# Patient Record
Sex: FEMALE | Race: WHITE | NOT HISPANIC OR LATINO | Employment: FULL TIME | URBAN - METROPOLITAN AREA
[De-identification: names, ages, dates, MRNs, and addresses within clinical notes are randomized per-mention and may not be internally consistent; named-entity substitution may affect disease eponyms.]

---

## 2022-06-01 ENCOUNTER — OFFICE VISIT (OUTPATIENT)
Dept: PEDIATRICS | Facility: CLINIC | Age: 32
End: 2022-06-01
Payer: COMMERCIAL

## 2022-06-01 VITALS
HEIGHT: 67 IN | SYSTOLIC BLOOD PRESSURE: 132 MMHG | BODY MASS INDEX: 22.16 KG/M2 | RESPIRATION RATE: 24 BRPM | HEART RATE: 83 BPM | TEMPERATURE: 98.8 F | DIASTOLIC BLOOD PRESSURE: 72 MMHG | OXYGEN SATURATION: 97 % | WEIGHT: 141.2 LBS

## 2022-06-01 DIAGNOSIS — Z76.89 ENCOUNTER TO ESTABLISH CARE: Primary | ICD-10-CM

## 2022-06-01 DIAGNOSIS — J45.40 MODERATE PERSISTENT ASTHMA WITHOUT COMPLICATION: ICD-10-CM

## 2022-06-01 DIAGNOSIS — Z12.4 CERVICAL CANCER SCREENING: ICD-10-CM

## 2022-06-01 DIAGNOSIS — F41.9 ANXIETY AND DEPRESSION: ICD-10-CM

## 2022-06-01 DIAGNOSIS — F32.A ANXIETY AND DEPRESSION: ICD-10-CM

## 2022-06-01 DIAGNOSIS — Z00.00 ROUTINE GENERAL MEDICAL EXAMINATION AT A HEALTH CARE FACILITY: ICD-10-CM

## 2022-06-01 PROCEDURE — G0145 SCR C/V CYTO,THINLAYER,RESCR: HCPCS | Performed by: NURSE PRACTITIONER

## 2022-06-01 PROCEDURE — 99385 PREV VISIT NEW AGE 18-39: CPT | Performed by: NURSE PRACTITIONER

## 2022-06-01 PROCEDURE — 87624 HPV HI-RISK TYP POOLED RSLT: CPT | Performed by: NURSE PRACTITIONER

## 2022-06-01 RX ORDER — ALBUTEROL SULFATE 90 UG/1
2 AEROSOL, METERED RESPIRATORY (INHALATION) EVERY 6 HOURS
Qty: 18 G | Refills: 3 | Status: SHIPPED | OUTPATIENT
Start: 2022-06-01 | End: 2022-09-29

## 2022-06-01 RX ORDER — MONTELUKAST SODIUM 10 MG/1
10 TABLET ORAL AT BEDTIME
COMMUNITY
End: 2022-09-29

## 2022-06-01 RX ORDER — SERTRALINE HYDROCHLORIDE 100 MG/1
100 TABLET, FILM COATED ORAL DAILY
COMMUNITY
End: 2022-09-29

## 2022-06-01 RX ORDER — FLUTICASONE PROPIONATE 110 UG/1
1 AEROSOL, METERED RESPIRATORY (INHALATION) 2 TIMES DAILY
Qty: 12 G | Refills: 3 | Status: SHIPPED | OUTPATIENT
Start: 2022-06-01 | End: 2023-08-15

## 2022-06-01 ASSESSMENT — ENCOUNTER SYMPTOMS
NAUSEA: 0
SHORTNESS OF BREATH: 1
DIZZINESS: 0
FEVER: 0
HEARTBURN: 0
HEADACHES: 0
ARTHRALGIAS: 0
FREQUENCY: 0
NERVOUS/ANXIOUS: 1
SORE THROAT: 0
DIARRHEA: 0
PARESTHESIAS: 0
PALPITATIONS: 0
COUGH: 0
EYE PAIN: 0
WEAKNESS: 0
HEMATURIA: 0
MYALGIAS: 0
CHILLS: 0
BREAST MASS: 0
JOINT SWELLING: 0
DYSURIA: 0
CONSTIPATION: 0
HEMATOCHEZIA: 0
ABDOMINAL PAIN: 0

## 2022-06-01 NOTE — PROGRESS NOTES
SUBJECTIVE:   CC: Selena Dutton is an 31 year old woman who presents for preventive health visit.     Patient has been advised of split billing requirements and indicates understanding: Yes     Healthy Habits:     Getting at least 3 servings of Calcium per day:  Yes    Bi-annual eye exam:  Yes    Dental care twice a year:  NO    Sleep apnea or symptoms of sleep apnea:  None    Diet:  Regular (no restrictions)    Frequency of exercise:  None    Taking medications regularly:  Yes    Medication side effects:  None    PHQ-2 Total Score: 0    Additional concerns today:  Yes    Would like to establish care with new provider.     She and her  are planning to star trying to conceive in 2023.    History of asthma since second grade - has been severe in the past. Previously managed on montelukast, flovent, albuterol. Ran out of montelukast and flovent so currently just utilizing albuterol. Several triggers - allergies to pollens, cats, dogs, dust, cold.    Sertraline since college for anxiety and depression (anxiety worse), sertraline 100 mg daily. Well managed.    Today's PHQ-2 Score:   PHQ-2 ( 1999 Pfizer) 6/1/2022   Q1: Little interest or pleasure in doing things 0   Q2: Feeling down, depressed or hopeless 0   PHQ-2 Score 0   Q1: Little interest or pleasure in doing things Not at all   Q2: Feeling down, depressed or hopeless Not at all   PHQ-2 Score 0       Abuse: Current or Past (Physical, Sexual or Emotional) - No  Do you feel safe in your environment? Yes    Social History     Tobacco Use     Smoking status: Never Smoker     Smokeless tobacco: Never Used   Substance Use Topics     Alcohol use: Yes     Comment: 3 drinks/week     If you drink alcohol do you typically have >3 drinks per day or >7 drinks per week? No    No flowsheet data found.    Reviewed orders with patient.  Reviewed health maintenance and updated orders accordingly - Yes  BP Readings from Last 3 Encounters:   06/01/22 132/72    Wt Readings  from Last 3 Encounters:   06/01/22 64 kg (141 lb 3.2 oz)            Breast Cancer Screening:  Any new diagnosis of family breast, ovarian, or bowel cancer? No    FHS-7: No flowsheet data found.    Patient under 40 years of age: Routine Mammogram Screening not recommended.   Pertinent mammograms are reviewed under the imaging tab.    History of abnormal Pap smear: NO - age 30-65 PAP every 5 years with negative HPV co-testing recommended     Reviewed and updated as needed this visit by clinical staff                  Reviewed and updated as needed this visit by Provider    Patient Active Problem List   Diagnosis     Moderate persistent asthma without complication     Anxiety and depression     Past Medical History:   Diagnosis Date     Anxiety and depression      Moderate persistent asthma without complication      Past Surgical History:   Procedure Laterality Date     HC TOOTH EXTRACTION W/FORCEP       MOUTH SURGERY       Family History   Problem Relation Age of Onset     Osteoporosis Mother      Heart Disease Father      No Known Problems Brother      Cholelithiasis Maternal Grandmother      Myocardial Infarction Maternal Grandfather      Skin Cancer Paternal Grandmother      Diabetes Type 2  Paternal Grandfather      Transient ischemic attack Maternal Uncle      Social History     Socioeconomic History     Marital status:      Spouse name: Not on file     Number of children: Not on file     Years of education: Not on file     Highest education level: Not on file   Occupational History     Not on file   Tobacco Use     Smoking status: Never Smoker     Smokeless tobacco: Never Used   Substance and Sexual Activity     Alcohol use: Yes     Comment: 3 drinks/week     Drug use: Never     Sexual activity: Yes     Partners: Male     Birth control/protection: None   Other Topics Concern     Not on file   Social History Narrative    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     Lives in [place] with her  and  dog.     Bought a house in [facility] this Spring, closes in 2 weeks.      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         Free time: puppy paule named Michelle.         Sary Meyer, DNP, APRN, CNP    06/01/22     Social Determinants of Health     Financial Resource Strain: Not on file   Food Insecurity: Not on file   Transportation Needs: Not on file   Physical Activity: Not on file   Stress: Not on file   Social Connections: Not on file   Intimate Partner Violence: Not on file   Housing Stability: Not on file     Current Outpatient Medications   Medication     albuterol (PROAIR HFA/PROVENTIL HFA/VENTOLIN HFA) 108 (90 Base) MCG/ACT inhaler     fluticasone (FLOVENT HFA) 110 MCG/ACT inhaler     montelukast (SINGULAIR) 10 MG tablet     sertraline (ZOLOFT) 100 MG tablet     No current facility-administered medications for this visit.      No Known Allergies      Review of Systems   Constitutional: Negative for chills and fever.   HENT: Negative for congestion, ear pain, hearing loss and sore throat.    Eyes: Negative for pain and visual disturbance.   Respiratory: Positive for shortness of breath. Negative for cough.    Cardiovascular: Negative for chest pain, palpitations and peripheral edema.   Gastrointestinal: Negative for abdominal pain, constipation, diarrhea, heartburn, hematochezia and nausea.   Breasts:  Negative for tenderness, breast mass and discharge.   Genitourinary: Negative for dysuria, frequency, genital sores, hematuria, pelvic pain, urgency, vaginal bleeding and vaginal discharge.   Musculoskeletal: Negative for arthralgias, joint swelling and myalgias.   Skin: Negative for rash.   Neurological: Negative for dizziness, weakness, headaches and paresthesias.   Psychiatric/Behavioral: Negative for mood changes. The patient is nervous/anxious.         OBJECTIVE:   /72 (BP Location: Right arm, Patient Position: Sitting, Cuff Size: Adult Regular)   Pulse 83   Temp 98.8  F (37.1  C)  "(Tympanic)   Resp 24   Ht 1.702 m (5' 7\")   Wt 64 kg (141 lb 3.2 oz)   SpO2 97%   BMI 22.12 kg/m    Physical Exam  Constitutional: appears to be in no acute distress, comfortable, pleasant.   Eyes: anicteric, conjunctiva clear without drainage or erythema. OMAR.   Ears, Nose and Throat: tympanic membranes gray with LR,  nose without nasal discharge. OP: no erythema to posterior pharynx, negative post nasal drainage, tonsils +1 no erythema or exudate.  Neck: supple, thyroid palpable,not enlarged, no nodules   Breast: Exam deferred (deferred after discussion of exam options with patient, no symptoms or concerns).   Cardiovascular: regular rate and rhythm, normal S1 and S2, no murmurs, rubs or gallops, peripheral pulses full and symmetric; negative peripheral edema   Respiratory: Air entry throughout. Breathing pattern unlabored without the use of accessory muscles. Clear to auscultation A and P, no wheezes or crackles, normal breath sounds.    Gastrointestinal: rounded, soft. Positive bowel sounds x4, nontender, no masses.   Genitourinary: external genitalia is without lesions. Introitus is normal, vaginal walls pink and moist without lesions or evidence of trauma. There is no abnormal discharge from the cervix. Cervix is pink without polyps or lesions.  Musculoskeletal: full range of motion, no edema.   Skin: pink, turgor smooth and elastic. Negative for lesions or dryness.  Neurological: normal gait, no tremor.   Psychological: appropriate mood and affect.   Lymphatic: no cervical, axillary, supraclavicular, or infraclavicular lymphadenopathy.    Diagnostic Test Results:  Labs reviewed in Epic    ASSESSMENT/PLAN:   (Z76.89) Encounter to establish care  (primary encounter diagnosis)  Histories and medications reviewed and updated.      (Z00.00) Routine general medical examination at a health care facility  Age appropriate screening and preventative care have been addressed today. Unable to review immunization " "record, will obtain outside records. Recommend annual vision exams as well as biannual dental exams. They will follow up for annual physical again in one year.   - REVIEW OF HEALTH MAINTENANCE PROTOCOL ORDERS            (Z12.4) Cervical cancer screening  - Pap Screen with HPV - recommended age 30 - 65 years            (J45.40) Moderate persistent asthma without complication  Stable. Refills provided.   - fluticasone (FLOVENT HFA) 110 MCG/ACT inhaler,   - albuterol (PROAIR HFA/PROVENTIL HFA/VENTOLIN HFA) 108 (90 Base) MCG/ACT inhaler      (F41.9,  F32.A) Anxiety and depression  Stable, continue current dose of sertraline. Annual follow-up or as needed.        COUNSELING:  Reviewed preventive health counseling, as reflected in patient instructions  Special attention given to:        Vision screening       Immunizations       Contraception       Family planning       Safe sex practices/STD prevention       Consider Hep C screening for all patients one time for ages 18-79 years       HIV screeninx in teen years, 1x in adult years, and at intervals if high risk    Estimated body mass index is 22.12 kg/m  as calculated from the following:    Height as of this encounter: 1.702 m (5' 7\").    Weight as of this encounter: 64 kg (141 lb 3.2 oz).        She reports that she has never smoked. She has never used smokeless tobacco.      Counseling Resources:  ATP IV Guidelines  Pooled Cohorts Equation Calculator  Breast Cancer Risk Calculator  BRCA-Related Cancer Risk Assessment: FHS-7 Tool  FRAX Risk Assessment  ICSI Preventive Guidelines  Dietary Guidelines for Americans,   USDA's MyPlate  ASA Prophylaxis  Lung CA Screening    KURT Tomas CNP  [facility]  "

## 2022-06-02 PROBLEM — F41.9 ANXIETY AND DEPRESSION: Status: ACTIVE | Noted: 2022-06-02

## 2022-06-02 PROBLEM — F32.A ANXIETY AND DEPRESSION: Status: ACTIVE | Noted: 2022-06-02

## 2022-06-02 PROBLEM — J45.40 MODERATE PERSISTENT ASTHMA WITHOUT COMPLICATION: Status: ACTIVE | Noted: 2022-06-02

## 2022-06-06 LAB
BKR LAB AP GYN ADEQUACY: NORMAL
BKR LAB AP GYN INTERPRETATION: NORMAL
BKR LAB AP HPV REFLEX: NORMAL
BKR LAB AP PREVIOUS ABNORMAL: NORMAL
PATH REPORT.COMMENTS IMP SPEC: NORMAL
PATH REPORT.COMMENTS IMP SPEC: NORMAL
PATH REPORT.RELEVANT HX SPEC: NORMAL

## 2022-06-08 LAB
HUMAN PAPILLOMA VIRUS 16 DNA: NEGATIVE
HUMAN PAPILLOMA VIRUS 18 DNA: NEGATIVE
HUMAN PAPILLOMA VIRUS FINAL DIAGNOSIS: NORMAL
HUMAN PAPILLOMA VIRUS OTHER HR: NEGATIVE

## 2022-09-29 ENCOUNTER — MYC MEDICAL ADVICE (OUTPATIENT)
Dept: PEDIATRICS | Facility: CLINIC | Age: 32
End: 2022-09-29

## 2022-09-29 DIAGNOSIS — Z30.09 GENERAL COUNSELING FOR PRESCRIPTION OF ORAL CONTRACEPTIVES: ICD-10-CM

## 2022-09-29 DIAGNOSIS — F41.9 ANXIETY AND DEPRESSION: Primary | ICD-10-CM

## 2022-09-29 DIAGNOSIS — J45.40 MODERATE PERSISTENT ASTHMA WITHOUT COMPLICATION: ICD-10-CM

## 2022-09-29 DIAGNOSIS — F32.A ANXIETY AND DEPRESSION: Primary | ICD-10-CM

## 2022-09-29 RX ORDER — DESOGESTREL AND ETHINYL ESTRADIOL 0.15-0.03
1 KIT ORAL DAILY
Qty: 84 TABLET | Refills: 3 | Status: SHIPPED | OUTPATIENT
Start: 2022-09-29 | End: 2023-05-04

## 2022-09-29 RX ORDER — MONTELUKAST SODIUM 10 MG/1
10 TABLET ORAL AT BEDTIME
Qty: 90 TABLET | Refills: 3 | Status: SHIPPED | OUTPATIENT
Start: 2022-09-29 | End: 2023-10-10

## 2022-09-29 RX ORDER — SERTRALINE HYDROCHLORIDE 100 MG/1
100 TABLET, FILM COATED ORAL DAILY
Qty: 90 TABLET | Refills: 3 | Status: SHIPPED | OUTPATIENT
Start: 2022-09-29 | End: 2023-10-10

## 2022-09-29 RX ORDER — ALBUTEROL SULFATE 90 UG/1
2 AEROSOL, METERED RESPIRATORY (INHALATION) EVERY 6 HOURS
Qty: 18 G | Refills: 3 | Status: SHIPPED | OUTPATIENT
Start: 2022-09-29 | End: 2023-09-19

## 2022-09-29 NOTE — TELEPHONE ENCOUNTER
Sary,    Please see MC message from pt  Meds pended    Thank you  John Patel RN on 9/29/2022 at 4:03 PM

## 2022-10-03 ENCOUNTER — HEALTH MAINTENANCE LETTER (OUTPATIENT)
Age: 32
End: 2022-10-03

## 2023-05-04 ENCOUNTER — VIRTUAL VISIT (OUTPATIENT)
Dept: NURSING | Facility: CLINIC | Age: 33
End: 2023-05-04
Payer: COMMERCIAL

## 2023-05-04 DIAGNOSIS — O09.91 SUPERVISION OF HIGH RISK PREGNANCY IN FIRST TRIMESTER: ICD-10-CM

## 2023-05-04 DIAGNOSIS — Z13.79 GENETIC SCREENING: ICD-10-CM

## 2023-05-04 DIAGNOSIS — O36.80X0 PREGNANCY WITH INCONCLUSIVE FETAL VIABILITY: Primary | ICD-10-CM

## 2023-05-04 PROBLEM — O09.90 SUPERVISION OF HIGH-RISK PREGNANCY: Status: ACTIVE | Noted: 2023-05-04

## 2023-05-04 PROCEDURE — 99207 PR NO CHARGE NURSE ONLY: CPT

## 2023-05-04 NOTE — PROGRESS NOTES
SUBJECTIVE:     HPI:    This is a 32 year old female patient,  who presents for her first obstetrical visit.    PIO: Not found..  She is Unknown weeks.  Her cycles are regular.  Her last menstrual period was normal.   Since her LMP, she has experienced  nausea.    Additional History: First pregnancy, has a close friend who saw Dr. Ghotra so is very excited to see her.    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      She has a history of  first pregnancy    Since her last LMP she denies use of alcohol, tobacco and street drugs.    Past medical, surgical, social and family history were reviewed and updated in [facility].      Current Outpatient Medications   Medication     albuterol (PROAIR HFA/PROVENTIL HFA/VENTOLIN HFA) 108 (90 Base) MCG/ACT inhaler     fluticasone (FLOVENT HFA) 110 MCG/ACT inhaler     montelukast (SINGULAIR) 10 MG tablet     Prenat w/o A-DY-Wcklkwg-FA-DHA (PNV-DHA PO)     sertraline (ZOLOFT) 100 MG tablet     No current facility-administered medications for this visit.       ROS:   12 point review of systems negative other than symptoms noted below or in the HPI.  Gastrointestinal: Nausea    Nurse phone visit completed. Prenatal book and folder containing standard educational hand-outs and brochures will be given at the next visit to patient. Information in folder reviewed over the phone and sent via [facility]. Questions answered. Information given on optional screening available to assess chromosomal anomalies. Pt desires NIPS. Pt advised to call the clinic if she has any questions or concerns related to her pregnancy.  Prenatal labs future ordered.   Chiara Tracy RN on 5/4/2023 at 11:30 AM        No results found for: PAP  PHQ-9 score:        5/2/2023     2:06 PM   PHQ   PHQ-9 Total Score 4   Q9: Thoughts of better off dead/self-harm past 2 weeks Not at all                   5/2/2023     2:05 PM   JAQUELIN-7 SCORE   Total Score 3 (minimal anxiety)   Total Score 3             Patient supplied answers from flow sheet for:  Prenatal OB Questionnaire.  Past Medical History  Have you ever recieved care for your mental health? : (!) (P) Yes  Have you ever been in a major accident or suffered serious trauma?: (P) No  Within the last year, has anyone hit, slapped, kicked or otherwise hurt you?: (P) No  In the last year, has anyone forced you to have sex when you didn't want to?: (P) No    Past Medical History 2   Have you ever received a blood transfusion?: (P) No  Would you accept a blood transfusion if was medically recommended?: (P) Yes  Does anyone in your home smoke?: (P) No   Is your blood type Rh negative?: (P) Unknown  Have you ever ?: (P) No  Have you been hospitalized for a nonsurgical reason excluding normal delivery?: (P) No  Have you ever had an abnormal pap smear?: (P) No    Past Medical History (Continued)  Do you have a history of abnormalities of the uterus?: (P) Unknown  Did your mother take KVNG or any other hormones when she was pregnant with you?: (P) No  Do you have any other problems we have not asked about which you feel may be important to this pregnancy?: (P) No                   OBJECTIVE:     EXAM:  There were no vitals taken for this visit. There is no height or weight on file to calculate BMI.

## 2023-05-10 ASSESSMENT — PATIENT HEALTH QUESTIONNAIRE - PHQ9
10. IF YOU CHECKED OFF ANY PROBLEMS, HOW DIFFICULT HAVE THESE PROBLEMS MADE IT FOR YOU TO DO YOUR WORK, TAKE CARE OF THINGS AT HOME, OR GET ALONG WITH OTHER PEOPLE: SOMEWHAT DIFFICULT
SUM OF ALL RESPONSES TO PHQ QUESTIONS 1-9: 3
SUM OF ALL RESPONSES TO PHQ QUESTIONS 1-9: 3

## 2023-05-10 ASSESSMENT — ANXIETY QUESTIONNAIRES
5. BEING SO RESTLESS THAT IT IS HARD TO SIT STILL: NOT AT ALL
IF YOU CHECKED OFF ANY PROBLEMS ON THIS QUESTIONNAIRE, HOW DIFFICULT HAVE THESE PROBLEMS MADE IT FOR YOU TO DO YOUR WORK, TAKE CARE OF THINGS AT HOME, OR GET ALONG WITH OTHER PEOPLE: NOT DIFFICULT AT ALL
GAD7 TOTAL SCORE: 3
4. TROUBLE RELAXING: SEVERAL DAYS
GAD7 TOTAL SCORE: 3
8. IF YOU CHECKED OFF ANY PROBLEMS, HOW DIFFICULT HAVE THESE MADE IT FOR YOU TO DO YOUR WORK, TAKE CARE OF THINGS AT HOME, OR GET ALONG WITH OTHER PEOPLE?: NOT DIFFICULT AT ALL
GAD7 TOTAL SCORE: 3
3. WORRYING TOO MUCH ABOUT DIFFERENT THINGS: NOT AT ALL
7. FEELING AFRAID AS IF SOMETHING AWFUL MIGHT HAPPEN: NOT AT ALL
2. NOT BEING ABLE TO STOP OR CONTROL WORRYING: NOT AT ALL
7. FEELING AFRAID AS IF SOMETHING AWFUL MIGHT HAPPEN: NOT AT ALL
6. BECOMING EASILY ANNOYED OR IRRITABLE: SEVERAL DAYS
1. FEELING NERVOUS, ANXIOUS, OR ON EDGE: SEVERAL DAYS

## 2023-05-16 LAB
ABO/RH(D): NORMAL
ANTIBODY SCREEN: NEGATIVE
SPECIMEN EXPIRATION DATE: NORMAL

## 2023-05-16 NOTE — PROGRESS NOTES
:      HPI:     This is a 32 year old female patient,  who presents for her first obstetrical visit.     PIO: Estimated Date of Delivery: Dec 12, 2023  She is 10w1d  Her cycles are regular.  Her last menstrual period was normal.   Since her LMP, she has experienced  nausea.     Additional History: First pregnancy, has a close friend who saw Dr. Ghotra so is very excited to see her.     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       She has a history of  first pregnancy     Since her last LMP she denies use of alcohol, tobacco and street drugs.     Past medical, surgical, social and family history were reviewed and updated in EPIC.            Current Outpatient Medications   Medication     albuterol (PROAIR HFA/PROVENTIL HFA/VENTOLIN HFA) 108 (90 Base) MCG/ACT inhaler     fluticasone (FLOVENT HFA) 110 MCG/ACT inhaler     montelukast (SINGULAIR) 10 MG tablet     Prenat w/o I-MI-Qlljegd-FA-DHA (PNV-DHA PO)     sertraline (ZOLOFT) 100 MG tablet      No current facility-administered medications for this visit.         ROS:   12 point review of systems negative other than symptoms noted below or in the HPI.  Gastrointestinal: Nausea     Nurse phone visit completed. Prenatal book and folder containing standard educational hand-outs and brochures will be given at the next visit to patient. Information in folder reviewed over the phone and sent via [facility]. Questions answered. Information given on optional screening available to assess chromosomal anomalies. Pt desires NIPS. Pt advised to call the clinic if she has  "any questions or concerns related to her pregnancy. Prenatal labs future ordered.   Chiara Tracy RN on 5/4/2023 at 11:30 AM           No results found for: PAP  PHQ-9 score:         5/2/2023     2:06 PM   PHQ   PHQ-9 Total Score 4   Q9: Thoughts of better off dead/self-harm past 2 weeks Not at all                          5/2/2023     2:05 PM   JAQUELIN-7 SCORE   Total Score 3 (minimal anxiety)   Total Score 3                  Patient supplied answers from flow sheet for:  Prenatal OB Questionnaire.  Past Medical History  Have you ever recieved care for your mental health? : (!) (P) Yes  Have you ever been in a major accident or suffered serious trauma?: (P) No  Within the last year, has anyone hit, slapped, kicked or otherwise hurt you?: (P) No  In the last year, has anyone forced you to have sex when you didn't want to?: (P) No     Past Medical History 2   Have you ever received a blood transfusion?: (P) No  Would you accept a blood transfusion if was medically recommended?: (P) Yes  Does anyone in your home smoke?: (P) No   Is your blood type Rh negative?: (P) Unknown  Have you ever ?: (P) No  Have you been hospitalized for a nonsurgical reason excluding normal delivery?: (P) No  Have you ever had an abnormal pap smear?: (P) No     Past Medical History (Continued)  Do you have a history of abnormalities of the uterus?: (P) Unknown  Did your mother take KVNG or any other hormones when she was pregnant with you?: (P) No  Do you have any other problems we have not asked about which you feel may be important to this pregnancy?: (P) No      OBJECTIVE:     EXAM:  /66   Ht 1.702 m (5' 7\")   Wt 66.2 kg (146 lb)   LMP 03/07/2023 (Exact Date)   BMI 22.87 kg/m   Body mass index is 22.87 kg/m .    GENERAL: healthy, alert and no distress  EYES: Eyes grossly normal to inspection, PERRL and conjunctivae and sclerae normal  NECK: no adenopathy, no asymmetry, masses, or scars and thyroid normal to palpation  RESP: " lungs clear to auscultation - no rales, rhonchi or wheezes  CV: regular rate and rhythm, normal S1 S2, no S3 or S4, no murmur, click or rub, no peripheral edema and peripheral pulses strong  ABDOMEN: soft, nontender, no hepatosplenomegaly, no masses and bowel sounds normal   (female): normal female external genitalia, normal urethral meatus, vaginal mucosa pink, moist, well rugated, and normal cervix/adnexa/uterus without masses or discharge. GC and chlamydia genprobe swab obtained and sent to lab.   MS: no gross musculoskeletal defects noted, no edema  SKIN: no suspicious lesions or rashes  NEURO: Normal strength and tone, mentation intact and speech normal  PSYCH: mentation appears normal, affect normal/bright    ASSESSMENT/PLAN:       ICD-10-CM    1. Encounter for supervision of normal first pregnancy in first trimester  Z34.01 NEISSERIA GONORRHOEA PCR     CHLAMYDIA TRACHOMATIS PCR     US OB > 14 Weeks      2. Genetic testing  Z13.79 Process and hold DNA     Process and hold DNA      3. Supervision of high risk pregnancy in first trimester  O09.91 Hepatitis B surface antigen     CBC with platelets     HIV Antigen Antibody Combo     Rubella Antibody IgG Quantitative     Treponema Abs w Reflex to RPR and Titer     Urine Culture Aerobic Bacterial     *UA reflex to Microscopic     Hepatitis C antibody     ABO/Rh type and screen      4. Genetic screening  Z13.79 Non Invasive Prenatal Test Cell Free DNA      5. Anxiety and depression  F41.9     F32.A           32 year old , On May 16, 2023 patient is 10w0d weeks of pregnancy with PIO of 2023, by Last Menstrual Period    PLAN/PATIENT INSTRUCTIONS:    Prenatal labs that will be done today reviewed. She has no questions.  Pap smear done today: No     Discussed options for screening for and diagnosis of chromosomal anomalies, including first trimester screen, noninvasive prenatal testing/cell-free fetal DNA testing, CVS/amniocentesis, quad screen, and  ultrasound or comprehensive Level II US at 18-20 weeks. She agreed noninvasive prenatal testing.     Reviewed early pregnancy education, diet, exercise, prenatal vitamins, intercourse. Reviewed the call schedule, labor and delivery, and the schedule of prenatal visits.     Follow up in 4 weeks. She is encouraged to call sooner with questions or concerns.    Recommended weight gain for pregnancy: 25-35 lbs..    Anxiety/Depression: Continue Zoloft    Asthma: Continue medications as needed.       Hazel Ghotra MD

## 2023-05-17 ENCOUNTER — PRENATAL OFFICE VISIT (OUTPATIENT)
Dept: OBGYN | Facility: CLINIC | Age: 33
End: 2023-05-17
Payer: COMMERCIAL

## 2023-05-17 ENCOUNTER — ANCILLARY PROCEDURE (OUTPATIENT)
Dept: ULTRASOUND IMAGING | Facility: CLINIC | Age: 33
End: 2023-05-17
Payer: COMMERCIAL

## 2023-05-17 VITALS
DIASTOLIC BLOOD PRESSURE: 66 MMHG | SYSTOLIC BLOOD PRESSURE: 110 MMHG | WEIGHT: 146 LBS | BODY MASS INDEX: 22.91 KG/M2 | HEIGHT: 67 IN

## 2023-05-17 DIAGNOSIS — Z13.79 GENETIC TESTING: ICD-10-CM

## 2023-05-17 DIAGNOSIS — F41.9 ANXIETY AND DEPRESSION: ICD-10-CM

## 2023-05-17 DIAGNOSIS — Z34.01 ENCOUNTER FOR SUPERVISION OF NORMAL FIRST PREGNANCY IN FIRST TRIMESTER: Primary | ICD-10-CM

## 2023-05-17 DIAGNOSIS — O09.91 SUPERVISION OF HIGH RISK PREGNANCY IN FIRST TRIMESTER: ICD-10-CM

## 2023-05-17 DIAGNOSIS — O36.80X0 PREGNANCY WITH INCONCLUSIVE FETAL VIABILITY: ICD-10-CM

## 2023-05-17 DIAGNOSIS — F32.A ANXIETY AND DEPRESSION: ICD-10-CM

## 2023-05-17 DIAGNOSIS — Z13.79 GENETIC SCREENING: ICD-10-CM

## 2023-05-17 LAB
ALBUMIN UR-MCNC: NEGATIVE MG/DL
APPEARANCE UR: CLEAR
BACTERIA #/AREA URNS HPF: ABNORMAL /HPF
BILIRUB UR QL STRIP: NEGATIVE
COLOR UR AUTO: YELLOW
ERYTHROCYTE [DISTWIDTH] IN BLOOD BY AUTOMATED COUNT: 13.2 % (ref 10–15)
GLUCOSE UR STRIP-MCNC: NEGATIVE MG/DL
HCT VFR BLD AUTO: 38.7 % (ref 35–47)
HGB BLD-MCNC: 13.3 G/DL (ref 11.7–15.7)
HGB UR QL STRIP: NEGATIVE
KETONES UR STRIP-MCNC: ABNORMAL MG/DL
LEUKOCYTE ESTERASE UR QL STRIP: ABNORMAL
MCH RBC QN AUTO: 28.9 PG (ref 26.5–33)
MCHC RBC AUTO-ENTMCNC: 34.4 G/DL (ref 31.5–36.5)
MCV RBC AUTO: 84 FL (ref 78–100)
NITRATE UR QL: NEGATIVE
PH UR STRIP: 5.5 [PH] (ref 5–7)
PLATELET # BLD AUTO: 239 10E3/UL (ref 150–450)
RBC # BLD AUTO: 4.6 10E6/UL (ref 3.8–5.2)
RBC #/AREA URNS AUTO: ABNORMAL /HPF
SP GR UR STRIP: 1.02 (ref 1–1.03)
SQUAMOUS #/AREA URNS AUTO: ABNORMAL /LPF
UROBILINOGEN UR STRIP-ACNC: 1 E.U./DL
WBC # BLD AUTO: 10.7 10E3/UL (ref 4–11)
WBC #/AREA URNS AUTO: ABNORMAL /HPF

## 2023-05-17 PROCEDURE — 87591 N.GONORRHOEAE DNA AMP PROB: CPT | Performed by: OBSTETRICS & GYNECOLOGY

## 2023-05-17 PROCEDURE — 86803 HEPATITIS C AB TEST: CPT | Performed by: OBSTETRICS & GYNECOLOGY

## 2023-05-17 PROCEDURE — 86850 RBC ANTIBODY SCREEN: CPT | Performed by: OBSTETRICS & GYNECOLOGY

## 2023-05-17 PROCEDURE — 86900 BLOOD TYPING SEROLOGIC ABO: CPT | Performed by: OBSTETRICS & GYNECOLOGY

## 2023-05-17 PROCEDURE — 99204 OFFICE O/P NEW MOD 45 MIN: CPT | Performed by: OBSTETRICS & GYNECOLOGY

## 2023-05-17 PROCEDURE — 86901 BLOOD TYPING SEROLOGIC RH(D): CPT | Performed by: OBSTETRICS & GYNECOLOGY

## 2023-05-17 PROCEDURE — 87340 HEPATITIS B SURFACE AG IA: CPT | Performed by: OBSTETRICS & GYNECOLOGY

## 2023-05-17 PROCEDURE — 85027 COMPLETE CBC AUTOMATED: CPT | Performed by: OBSTETRICS & GYNECOLOGY

## 2023-05-17 PROCEDURE — 81001 URINALYSIS AUTO W/SCOPE: CPT | Performed by: OBSTETRICS & GYNECOLOGY

## 2023-05-17 PROCEDURE — 87491 CHLMYD TRACH DNA AMP PROBE: CPT | Performed by: OBSTETRICS & GYNECOLOGY

## 2023-05-17 PROCEDURE — 76801 OB US < 14 WKS SINGLE FETUS: CPT | Performed by: OBSTETRICS & GYNECOLOGY

## 2023-05-17 PROCEDURE — 87086 URINE CULTURE/COLONY COUNT: CPT | Performed by: OBSTETRICS & GYNECOLOGY

## 2023-05-17 PROCEDURE — 86780 TREPONEMA PALLIDUM: CPT | Performed by: OBSTETRICS & GYNECOLOGY

## 2023-05-17 PROCEDURE — 36415 COLL VENOUS BLD VENIPUNCTURE: CPT | Performed by: OBSTETRICS & GYNECOLOGY

## 2023-05-17 PROCEDURE — 86762 RUBELLA ANTIBODY: CPT | Performed by: OBSTETRICS & GYNECOLOGY

## 2023-05-17 PROCEDURE — 87389 HIV-1 AG W/HIV-1&-2 AB AG IA: CPT | Performed by: OBSTETRICS & GYNECOLOGY

## 2023-05-18 LAB
C TRACH DNA SPEC QL NAA+PROBE: NEGATIVE
HBV SURFACE AG SERPL QL IA: NONREACTIVE
HCV AB SERPL QL IA: NONREACTIVE
HIV 1+2 AB+HIV1 P24 AG SERPL QL IA: NONREACTIVE
N GONORRHOEA DNA SPEC QL NAA+PROBE: NEGATIVE
RUBV IGG SERPL QL IA: 1.08 INDEX
RUBV IGG SERPL QL IA: POSITIVE
T PALLIDUM AB SER QL: NONREACTIVE

## 2023-05-19 LAB — BACTERIA UR CULT: NO GROWTH

## 2023-05-24 LAB — SCANNED LAB RESULT: NORMAL

## 2023-06-02 ENCOUNTER — LAB (OUTPATIENT)
Dept: LAB | Facility: CLINIC | Age: 33
End: 2023-06-02
Payer: COMMERCIAL

## 2023-06-02 DIAGNOSIS — Z13.79 GENETIC TESTING: ICD-10-CM

## 2023-06-02 DIAGNOSIS — Z13.79 GENETIC TESTING: Primary | ICD-10-CM

## 2023-06-02 PROCEDURE — 36415 COLL VENOUS BLD VENIPUNCTURE: CPT

## 2023-06-02 NOTE — PROGRESS NOTES
Opened encounter under wrong provider but ordered NIPS under correct provider: Dr Paradise Isbell, RN on 6/2/2023 at 3:37 PM

## 2023-06-08 ENCOUNTER — MYC MEDICAL ADVICE (OUTPATIENT)
Dept: OBGYN | Facility: CLINIC | Age: 33
End: 2023-06-08
Payer: COMMERCIAL

## 2023-06-12 LAB — SCANNED LAB RESULT: NORMAL

## 2023-06-21 ENCOUNTER — PRENATAL OFFICE VISIT (OUTPATIENT)
Dept: OBGYN | Facility: CLINIC | Age: 33
End: 2023-06-21
Payer: COMMERCIAL

## 2023-06-21 VITALS — DIASTOLIC BLOOD PRESSURE: 64 MMHG | WEIGHT: 142 LBS | BODY MASS INDEX: 22.24 KG/M2 | SYSTOLIC BLOOD PRESSURE: 118 MMHG

## 2023-06-21 DIAGNOSIS — Z3A.15 15 WEEKS GESTATION OF PREGNANCY: ICD-10-CM

## 2023-06-21 DIAGNOSIS — F32.A ANXIETY AND DEPRESSION: ICD-10-CM

## 2023-06-21 DIAGNOSIS — Z36.1 ENCOUNTER FOR ANTENATAL SCREENING FOR HIGH ALPHA-FETOPROTEIN LEVEL: ICD-10-CM

## 2023-06-21 DIAGNOSIS — F41.9 ANXIETY AND DEPRESSION: ICD-10-CM

## 2023-06-21 DIAGNOSIS — O09.92 SUPERVISION OF HIGH RISK PREGNANCY IN SECOND TRIMESTER: Primary | ICD-10-CM

## 2023-06-21 PROCEDURE — 99207 PR PRENATAL VISIT: CPT | Performed by: OBSTETRICS & GYNECOLOGY

## 2023-06-21 RX ORDER — MULTIVITAMIN WITH IRON
100 TABLET ORAL DAILY
Status: ON HOLD | COMMUNITY
End: 2023-09-30

## 2023-06-21 NOTE — PROGRESS NOTES
Selena Dutton is a 32 year old  at 15w1d    She complains of nausea. Vomiting some days.  Has been using B6. Has not tried Unisom  Prenatal vitamins making her nauseated.  Denies LOF, VB, ctx. +FM.      ICD-10-CM    1. Supervision of high risk pregnancy in second trimester  O09.92       2. Encounter for  screening for high alpha-fetoprotein level  Z36.1       3. Anxiety and depression  F41.9     F32.A       4. 15 weeks gestation of pregnancy  Z3A.15         - First tri labs wnl.   - NIPT normal; AFP declined  - Anatomy US scheduled on    - Discussed OTC treatment of N/V in pregnancy. Will try adding Unisom.  If still not effective can prescribe Zofran.  - Anxiety/Depression stable on Zoloft.  - TWG: -3lbs. Expect 25-35 lbs.  - follow-up in 4 weeks

## 2023-06-27 ENCOUNTER — MYC MEDICAL ADVICE (OUTPATIENT)
Dept: OBGYN | Facility: CLINIC | Age: 33
End: 2023-06-27
Payer: COMMERCIAL

## 2023-06-27 DIAGNOSIS — O21.9 NAUSEA AND VOMITING DURING PREGNANCY: Primary | ICD-10-CM

## 2023-06-29 RX ORDER — ONDANSETRON 4 MG/1
4 TABLET, FILM COATED ORAL EVERY 8 HOURS PRN
Qty: 30 TABLET | Refills: 0 | Status: ON HOLD | OUTPATIENT
Start: 2023-06-29 | End: 2023-09-30

## 2023-07-26 ENCOUNTER — ANCILLARY PROCEDURE (OUTPATIENT)
Dept: ULTRASOUND IMAGING | Facility: CLINIC | Age: 33
End: 2023-07-26
Payer: COMMERCIAL

## 2023-07-26 ENCOUNTER — PRENATAL OFFICE VISIT (OUTPATIENT)
Dept: OBGYN | Facility: CLINIC | Age: 33
End: 2023-07-26
Payer: COMMERCIAL

## 2023-07-26 VITALS
HEIGHT: 67 IN | SYSTOLIC BLOOD PRESSURE: 118 MMHG | WEIGHT: 142 LBS | BODY MASS INDEX: 22.29 KG/M2 | DIASTOLIC BLOOD PRESSURE: 64 MMHG

## 2023-07-26 DIAGNOSIS — Z3A.20 20 WEEKS GESTATION OF PREGNANCY: ICD-10-CM

## 2023-07-26 DIAGNOSIS — O09.93 ENCOUNTER FOR SUPERVISION OF HIGH RISK PREGNANCY IN THIRD TRIMESTER, ANTEPARTUM: Primary | ICD-10-CM

## 2023-07-26 DIAGNOSIS — F32.A ANXIETY AND DEPRESSION: ICD-10-CM

## 2023-07-26 DIAGNOSIS — Z23 NEED FOR TDAP VACCINATION: ICD-10-CM

## 2023-07-26 DIAGNOSIS — Z34.01 ENCOUNTER FOR SUPERVISION OF NORMAL FIRST PREGNANCY IN FIRST TRIMESTER: ICD-10-CM

## 2023-07-26 DIAGNOSIS — O44.42 LOW LYING PLACENTA NOS OR WITHOUT HEMORRHAGE, SECOND TRIMESTER: ICD-10-CM

## 2023-07-26 DIAGNOSIS — O09.92 SUPERVISION OF HIGH RISK PREGNANCY IN SECOND TRIMESTER: Primary | ICD-10-CM

## 2023-07-26 DIAGNOSIS — F41.9 ANXIETY AND DEPRESSION: ICD-10-CM

## 2023-07-26 PROCEDURE — 76805 OB US >/= 14 WKS SNGL FETUS: CPT | Performed by: OBSTETRICS & GYNECOLOGY

## 2023-07-26 PROCEDURE — 99207 PR PRENATAL VISIT: CPT | Performed by: OBSTETRICS & GYNECOLOGY

## 2023-07-26 NOTE — PROGRESS NOTES
Selena Dutton is a 32 year old  at 20w1d     She states nausea has improved  Switched to gummi prenatal vitamins. Wondering about iron supplement  Started magnesium for restless legs.  Denies LOF, VB, ctx. +FM.      ICD-10-CM    1. Supervision of high risk pregnancy in second trimester  O09.92       2. Low lying placenta nos or without hemorrhage, second trimester  O44.42 US OB >14 Weeks Follow Up     CANCELED: US OB >14 Weeks Follow Up      3. Anxiety and depression  F41.9     F32.A       4. 20 weeks gestation of pregnancy  Z3A.20         - Discussed allergy medications in pregnancy  - First tri labs wnl.   - NIPT normal; AFP declined  - Anatomy US completed on . Low-lying placenta. EFW 15%tile. Plan repeat at 28-30 weeks  - 3rd trimester labs at 28 weeks.  - GBS at 36 weeks  - Anxiety/Depression stable on Zoloft.  - TWG: -3lbs. Expect 25-35 lbs.  - follow-up in 4 weeks

## 2023-08-13 ENCOUNTER — HEALTH MAINTENANCE LETTER (OUTPATIENT)
Age: 33
End: 2023-08-13

## 2023-08-15 DIAGNOSIS — J45.40 MODERATE PERSISTENT ASTHMA WITHOUT COMPLICATION: ICD-10-CM

## 2023-08-17 RX ORDER — FLUTICASONE PROPIONATE 110 UG/1
1 AEROSOL, METERED RESPIRATORY (INHALATION) 2 TIMES DAILY
Qty: 12 G | Refills: 0 | Status: SHIPPED | OUTPATIENT
Start: 2023-08-17 | End: 2023-09-19

## 2023-08-17 NOTE — TELEPHONE ENCOUNTER
Left a voicemail for patient to call us back. Please help patient with setting up a virtual visit with Blaire Jade for a Friday since Sary Meyer is out on ANN-MARIE.

## 2023-08-17 NOTE — TELEPHONE ENCOUNTER
Routing refill request to provider for review/approval because:  Labs out of range:  no asthma control test on file  Due to be seen for 6 month asthma check per protocol  Mary Jane GASCA RN

## 2023-08-22 ENCOUNTER — PRENATAL OFFICE VISIT (OUTPATIENT)
Dept: OBGYN | Facility: CLINIC | Age: 33
End: 2023-08-22
Payer: COMMERCIAL

## 2023-08-22 VITALS — BODY MASS INDEX: 22.9 KG/M2 | WEIGHT: 146.2 LBS | SYSTOLIC BLOOD PRESSURE: 116 MMHG | DIASTOLIC BLOOD PRESSURE: 64 MMHG

## 2023-08-22 DIAGNOSIS — Z3A.24 24 WEEKS GESTATION OF PREGNANCY: ICD-10-CM

## 2023-08-22 DIAGNOSIS — O44.42 LOW LYING PLACENTA NOS OR WITHOUT HEMORRHAGE, SECOND TRIMESTER: ICD-10-CM

## 2023-08-22 DIAGNOSIS — O09.92 SUPERVISION OF HIGH RISK PREGNANCY IN SECOND TRIMESTER: Primary | ICD-10-CM

## 2023-08-22 DIAGNOSIS — F32.A ANXIETY AND DEPRESSION: ICD-10-CM

## 2023-08-22 DIAGNOSIS — F41.9 ANXIETY AND DEPRESSION: ICD-10-CM

## 2023-08-22 PROCEDURE — 99207 PR PRENATAL VISIT: CPT | Performed by: OBSTETRICS & GYNECOLOGY

## 2023-08-22 NOTE — PROGRESS NOTES
Selena Dutton is a 32 year old  at 24w0d    She states nausea has improved  Switched to gummi prenatal vitamins. Wondering about iron supplement  Started magnesium for restless legs.  Denies LOF, VB, ctx. +FM.      ICD-10-CM    1. Supervision of high risk pregnancy in second trimester  O09.92       2. Low lying placenta nos or without hemorrhage, second trimester  O44.42       3. Anxiety and depression  F41.9     F32.A       4. 24 weeks gestation of pregnancy  Z3A.24         - First tri labs wnl.   - NIPT normal; AFP declined  - Anatomy US completed on . Low-lying placenta. EFW 15%tile. Plan repeat on   - 3rd trimester labs at 28 weeks.  - GBS at 36 weeks  - Anxiety/Depression stable on Zoloft.  - TW lbs. Expect 25-35 lbs.  - follow-up in 4 weeks

## 2023-09-13 ENCOUNTER — MYC MEDICAL ADVICE (OUTPATIENT)
Dept: OBGYN | Facility: CLINIC | Age: 33
End: 2023-09-13
Payer: COMMERCIAL

## 2023-09-15 NOTE — PROGRESS NOTES
Selena Dutton is a 32 year old  at 28w0d    Has questions about vaccines.    Switched to gummi prenatal vitamins. Wondering about iron supplement  Started magnesium for restless legs.  Denies LOF, VB, ctx. +FM.      ICD-10-CM    1. Supervision of high risk pregnancy in third trimester  O09.93       2. Moderate persistent asthma without complication  J45.40       3. 28 weeks gestation of pregnancy  Z3A.28       4. Need for prophylactic vaccination and inoculation against influenza  Z23       5. Need for prophylactic vaccination with combined diphtheria-tetanus-pertussis (DTP) vaccine  Z23 TDAP VACCINE (Adacel, Boostrix)  [[id number]]      6. Small head circumference  R68.89 [facility] Referral - Pregnancy          - First tri labs wnl.   - NIPT normal; AFP declined  - Anatomy US completed on . Low-lying placenta. EFW 15%tile. Plan repeat on  EFW 13%tile, HC 3%tile; Low-lying placenta resolved. Will refer to [facility] due to HC percentile.  - 3rd trimester labs at 28 weeks.  - Flu and Tdap today.  - GBS at 36 weeks  - Anxiety/Depression stable on Zoloft.  - TW lbs. Expect 25-35 lbs.  - follow-up in 2 weeks

## 2023-09-19 ENCOUNTER — ANCILLARY PROCEDURE (OUTPATIENT)
Dept: ULTRASOUND IMAGING | Facility: CLINIC | Age: 33
End: 2023-09-19
Attending: OBSTETRICS & GYNECOLOGY
Payer: COMMERCIAL

## 2023-09-19 ENCOUNTER — LAB (OUTPATIENT)
Dept: LAB | Facility: CLINIC | Age: 33
End: 2023-09-19
Attending: OBSTETRICS & GYNECOLOGY
Payer: COMMERCIAL

## 2023-09-19 ENCOUNTER — TRANSCRIBE ORDERS (OUTPATIENT)
Dept: MATERNAL FETAL MEDICINE | Facility: CLINIC | Age: 33
End: 2023-09-19

## 2023-09-19 ENCOUNTER — PRENATAL OFFICE VISIT (OUTPATIENT)
Dept: OBGYN | Facility: CLINIC | Age: 33
End: 2023-09-19
Attending: OBSTETRICS & GYNECOLOGY
Payer: COMMERCIAL

## 2023-09-19 VITALS — SYSTOLIC BLOOD PRESSURE: 122 MMHG | DIASTOLIC BLOOD PRESSURE: 64 MMHG | BODY MASS INDEX: 24.09 KG/M2 | WEIGHT: 153.8 LBS

## 2023-09-19 DIAGNOSIS — O44.42 LOW LYING PLACENTA NOS OR WITHOUT HEMORRHAGE, SECOND TRIMESTER: ICD-10-CM

## 2023-09-19 DIAGNOSIS — R68.89 SMALL HEAD CIRCUMFERENCE: ICD-10-CM

## 2023-09-19 DIAGNOSIS — O26.90 PREGNANCY RELATED CONDITION, ANTEPARTUM: Primary | ICD-10-CM

## 2023-09-19 DIAGNOSIS — Z3A.28 28 WEEKS GESTATION OF PREGNANCY: ICD-10-CM

## 2023-09-19 DIAGNOSIS — O09.93 SUPERVISION OF HIGH RISK PREGNANCY IN THIRD TRIMESTER: Primary | ICD-10-CM

## 2023-09-19 DIAGNOSIS — Z23 NEED FOR PROPHYLACTIC VACCINATION AND INOCULATION AGAINST INFLUENZA: ICD-10-CM

## 2023-09-19 DIAGNOSIS — Z23 NEED FOR PROPHYLACTIC VACCINATION WITH COMBINED DIPHTHERIA-TETANUS-PERTUSSIS (DTP) VACCINE: ICD-10-CM

## 2023-09-19 DIAGNOSIS — J45.40 MODERATE PERSISTENT ASTHMA WITHOUT COMPLICATION: ICD-10-CM

## 2023-09-19 DIAGNOSIS — O09.93 ENCOUNTER FOR SUPERVISION OF HIGH RISK PREGNANCY IN THIRD TRIMESTER, ANTEPARTUM: ICD-10-CM

## 2023-09-19 LAB
ERYTHROCYTE [DISTWIDTH] IN BLOOD BY AUTOMATED COUNT: 14.1 % (ref 10–15)
GLUCOSE 1H P 50 G GLC PO SERPL-MCNC: 112 MG/DL (ref 70–129)
HCT VFR BLD AUTO: 31.4 % (ref 35–47)
HGB BLD-MCNC: 10.7 G/DL (ref 11.7–15.7)
MCH RBC QN AUTO: 30.2 PG (ref 26.5–33)
MCHC RBC AUTO-ENTMCNC: 34.1 G/DL (ref 31.5–36.5)
MCV RBC AUTO: 89 FL (ref 78–100)
PLATELET # BLD AUTO: 177 10E3/UL (ref 150–450)
RBC # BLD AUTO: 3.54 10E6/UL (ref 3.8–5.2)
WBC # BLD AUTO: 10.3 10E3/UL (ref 4–11)

## 2023-09-19 PROCEDURE — 99207 PR PRENATAL VISIT: CPT | Performed by: OBSTETRICS & GYNECOLOGY

## 2023-09-19 PROCEDURE — 85027 COMPLETE CBC AUTOMATED: CPT

## 2023-09-19 PROCEDURE — 90715 TDAP VACCINE 7 YRS/> IM: CPT | Performed by: OBSTETRICS & GYNECOLOGY

## 2023-09-19 PROCEDURE — 90686 IIV4 VACC NO PRSV 0.5 ML IM: CPT | Performed by: OBSTETRICS & GYNECOLOGY

## 2023-09-19 PROCEDURE — 90471 IMMUNIZATION ADMIN: CPT | Performed by: OBSTETRICS & GYNECOLOGY

## 2023-09-19 PROCEDURE — 76816 OB US FOLLOW-UP PER FETUS: CPT | Performed by: OBSTETRICS & GYNECOLOGY

## 2023-09-19 PROCEDURE — 82950 GLUCOSE TEST: CPT

## 2023-09-19 PROCEDURE — 90472 IMMUNIZATION ADMIN EACH ADD: CPT | Performed by: OBSTETRICS & GYNECOLOGY

## 2023-09-19 PROCEDURE — 36415 COLL VENOUS BLD VENIPUNCTURE: CPT

## 2023-09-19 PROCEDURE — 59425 ANTEPARTUM CARE ONLY: CPT | Performed by: OBSTETRICS & GYNECOLOGY

## 2023-09-19 RX ORDER — ALBUTEROL SULFATE 90 UG/1
2 AEROSOL, METERED RESPIRATORY (INHALATION) EVERY 6 HOURS
Qty: 18 G | Refills: 3 | Status: SHIPPED | OUTPATIENT
Start: 2023-09-19

## 2023-09-19 RX ORDER — FLUTICASONE PROPIONATE 110 UG/1
1 AEROSOL, METERED RESPIRATORY (INHALATION) 2 TIMES DAILY
Qty: 12 G | Refills: 0 | Status: SHIPPED | OUTPATIENT
Start: 2023-09-19

## 2023-09-25 ENCOUNTER — PRE VISIT (OUTPATIENT)
Dept: MATERNAL FETAL MEDICINE | Facility: CLINIC | Age: 33
End: 2023-09-25
Payer: COMMERCIAL

## 2023-09-29 ENCOUNTER — OFFICE VISIT (OUTPATIENT)
Dept: MATERNAL FETAL MEDICINE | Facility: CLINIC | Age: 33
End: 2023-09-29
Attending: OBSTETRICS & GYNECOLOGY
Payer: COMMERCIAL

## 2023-09-29 ENCOUNTER — HOSPITAL ENCOUNTER (INPATIENT)
Facility: CLINIC | Age: 33
LOS: 1 days | Discharge: ANOTHER HOSPITAL | DRG: 832 | End: 2023-09-30
Attending: OBSTETRICS & GYNECOLOGY | Admitting: OBSTETRICS & GYNECOLOGY
Payer: COMMERCIAL

## 2023-09-29 ENCOUNTER — HOSPITAL ENCOUNTER (OUTPATIENT)
Dept: ULTRASOUND IMAGING | Facility: CLINIC | Age: 33
Discharge: HOME | End: 2023-09-29
Attending: OBSTETRICS & GYNECOLOGY
Payer: COMMERCIAL

## 2023-09-29 VITALS — DIASTOLIC BLOOD PRESSURE: 113 MMHG | HEART RATE: 65 BPM | SYSTOLIC BLOOD PRESSURE: 182 MMHG

## 2023-09-29 DIAGNOSIS — O26.90 PREGNANCY RELATED CONDITION, ANTEPARTUM: ICD-10-CM

## 2023-09-29 DIAGNOSIS — O16.3 SEVERE HYPERTENSION AFFECTING PREGNANCY IN THIRD TRIMESTER: Primary | ICD-10-CM

## 2023-09-29 DIAGNOSIS — O36.5990 PREGNANCY AFFECTED BY FETAL GROWTH RESTRICTION: Primary | ICD-10-CM

## 2023-09-29 DIAGNOSIS — O16.2 HYPERTENSION AFFECTING PREGNANCY IN SECOND TRIMESTER: ICD-10-CM

## 2023-09-29 PROBLEM — Z36.89 ENCOUNTER FOR TRIAGE IN PREGNANT PATIENT: Status: ACTIVE | Noted: 2023-09-29

## 2023-09-29 LAB
ABO/RH(D): NORMAL
ALBUMIN MFR UR ELPH: 16.2 MG/DL
ALBUMIN SERPL BCG-MCNC: 3.1 G/DL (ref 3.5–5.2)
ALP SERPL-CCNC: 125 U/L (ref 35–104)
ALT SERPL W P-5'-P-CCNC: 27 U/L (ref 0–50)
ANION GAP SERPL CALCULATED.3IONS-SCNC: 11 MMOL/L (ref 7–15)
ANTIBODY SCREEN: NEGATIVE
AST SERPL W P-5'-P-CCNC: 37 U/L (ref 0–45)
BILIRUB SERPL-MCNC: 0.2 MG/DL
BUN SERPL-MCNC: 6.3 MG/DL (ref 6–20)
CALCIUM SERPL-MCNC: 8.7 MG/DL (ref 8.6–10)
CHLORIDE SERPL-SCNC: 106 MMOL/L (ref 98–107)
CREAT SERPL-MCNC: 0.83 MG/DL (ref 0.51–0.95)
CREAT UR-MCNC: 24.3 MG/DL
DEPRECATED HCO3 PLAS-SCNC: 22 MMOL/L (ref 22–29)
EGFRCR SERPLBLD CKD-EPI 2021: >90 ML/MIN/1.73M2
ERYTHROCYTE [DISTWIDTH] IN BLOOD BY AUTOMATED COUNT: 13.9 % (ref 10–15)
GLUCOSE SERPL-MCNC: 81 MG/DL (ref 70–99)
HCT VFR BLD AUTO: 30.4 % (ref 35–47)
HGB BLD-MCNC: 10.6 G/DL (ref 11.7–15.7)
HOLD SPECIMEN: NORMAL
MCH RBC QN AUTO: 30.6 PG (ref 26.5–33)
MCHC RBC AUTO-ENTMCNC: 34.9 G/DL (ref 31.5–36.5)
MCV RBC AUTO: 88 FL (ref 78–100)
PLATELET # BLD AUTO: 159 10E3/UL (ref 150–450)
POTASSIUM SERPL-SCNC: 3.2 MMOL/L (ref 3.4–5.3)
PROT SERPL-MCNC: 5.8 G/DL (ref 6.4–8.3)
PROT/CREAT 24H UR: 0.67 MG/MG CR (ref 0–0.2)
RBC # BLD AUTO: 3.46 10E6/UL (ref 3.8–5.2)
SODIUM SERPL-SCNC: 139 MMOL/L (ref 135–145)
SPECIMEN EXPIRATION DATE: NORMAL
WBC # BLD AUTO: 12.6 10E3/UL (ref 4–11)

## 2023-09-29 PROCEDURE — 250N000011 HC RX IP 250 OP 636: Mod: JZ | Performed by: OBSTETRICS & GYNECOLOGY

## 2023-09-29 PROCEDURE — 250N000013 HC RX MED GY IP 250 OP 250 PS 637: Performed by: OBSTETRICS & GYNECOLOGY

## 2023-09-29 PROCEDURE — 59025 FETAL NON-STRESS TEST: CPT | Mod: 26 | Performed by: OBSTETRICS & GYNECOLOGY

## 2023-09-29 PROCEDURE — 99204 OFFICE O/P NEW MOD 45 MIN: CPT | Mod: 25 | Performed by: OBSTETRICS & GYNECOLOGY

## 2023-09-29 PROCEDURE — 250N000013 HC RX MED GY IP 250 OP 250 PS 637

## 2023-09-29 PROCEDURE — 80053 COMPREHEN METABOLIC PANEL: CPT | Performed by: OBSTETRICS & GYNECOLOGY

## 2023-09-29 PROCEDURE — 250N000011 HC RX IP 250 OP 636: Mod: JZ

## 2023-09-29 PROCEDURE — 86850 RBC ANTIBODY SCREEN: CPT | Performed by: OBSTETRICS & GYNECOLOGY

## 2023-09-29 PROCEDURE — 36415 COLL VENOUS BLD VENIPUNCTURE: CPT | Performed by: OBSTETRICS & GYNECOLOGY

## 2023-09-29 PROCEDURE — 76811 OB US DETAILED SNGL FETUS: CPT

## 2023-09-29 PROCEDURE — 250N000011 HC RX IP 250 OP 636: Performed by: STUDENT IN AN ORGANIZED HEALTH CARE EDUCATION/TRAINING PROGRAM

## 2023-09-29 PROCEDURE — 86901 BLOOD TYPING SEROLOGIC RH(D): CPT | Performed by: OBSTETRICS & GYNECOLOGY

## 2023-09-29 PROCEDURE — 59025 FETAL NON-STRESS TEST: CPT

## 2023-09-29 PROCEDURE — 200N000001 HC R&B ICU

## 2023-09-29 PROCEDURE — 76811 OB US DETAILED SNGL FETUS: CPT | Mod: 26 | Performed by: OBSTETRICS & GYNECOLOGY

## 2023-09-29 PROCEDURE — 258N000003 HC RX IP 258 OP 636: Performed by: OBSTETRICS & GYNECOLOGY

## 2023-09-29 PROCEDURE — 84156 ASSAY OF PROTEIN URINE: CPT | Performed by: OBSTETRICS & GYNECOLOGY

## 2023-09-29 PROCEDURE — 250N000013 HC RX MED GY IP 250 OP 250 PS 637: Performed by: STUDENT IN AN ORGANIZED HEALTH CARE EDUCATION/TRAINING PROGRAM

## 2023-09-29 PROCEDURE — 85027 COMPLETE CBC AUTOMATED: CPT | Performed by: OBSTETRICS & GYNECOLOGY

## 2023-09-29 PROCEDURE — 76820 UMBILICAL ARTERY ECHO: CPT | Mod: 26 | Performed by: OBSTETRICS & GYNECOLOGY

## 2023-09-29 PROCEDURE — 99223 1ST HOSP IP/OBS HIGH 75: CPT | Performed by: OBSTETRICS & GYNECOLOGY

## 2023-09-29 RX ORDER — ONDANSETRON 2 MG/ML
4 INJECTION INTRAMUSCULAR; INTRAVENOUS EVERY 6 HOURS PRN
Status: DISCONTINUED | OUTPATIENT
Start: 2023-09-29 | End: 2023-09-30 | Stop reason: HOSPADM

## 2023-09-29 RX ORDER — CALCIUM GLUCONATE 94 MG/ML
1 INJECTION, SOLUTION INTRAVENOUS
Status: DISCONTINUED | OUTPATIENT
Start: 2023-09-29 | End: 2023-09-30 | Stop reason: HOSPADM

## 2023-09-29 RX ORDER — ONDANSETRON 2 MG/ML
4 INJECTION INTRAMUSCULAR; INTRAVENOUS EVERY 6 HOURS PRN
Status: DISCONTINUED | OUTPATIENT
Start: 2023-09-29 | End: 2023-09-29 | Stop reason: HOSPADM

## 2023-09-29 RX ORDER — SODIUM CHLORIDE, SODIUM LACTATE, POTASSIUM CHLORIDE, CALCIUM CHLORIDE 600; 310; 30; 20 MG/100ML; MG/100ML; MG/100ML; MG/100ML
10-125 INJECTION, SOLUTION INTRAVENOUS CONTINUOUS
Status: DISCONTINUED | OUTPATIENT
Start: 2023-09-29 | End: 2023-09-30 | Stop reason: HOSPADM

## 2023-09-29 RX ORDER — LIDOCAINE 40 MG/G
CREAM TOPICAL
Status: DISCONTINUED | OUTPATIENT
Start: 2023-09-29 | End: 2023-09-30 | Stop reason: HOSPADM

## 2023-09-29 RX ORDER — PROCHLORPERAZINE MALEATE 5 MG
10 TABLET ORAL EVERY 6 HOURS PRN
Status: DISCONTINUED | OUTPATIENT
Start: 2023-09-29 | End: 2023-09-29 | Stop reason: HOSPADM

## 2023-09-29 RX ORDER — METOCLOPRAMIDE 10 MG/1
10 TABLET ORAL EVERY 6 HOURS PRN
Status: DISCONTINUED | OUTPATIENT
Start: 2023-09-29 | End: 2023-09-30 | Stop reason: HOSPADM

## 2023-09-29 RX ORDER — NIFEDIPINE 10 MG/1
CAPSULE ORAL
Status: COMPLETED
Start: 2023-09-29 | End: 2023-09-29

## 2023-09-29 RX ORDER — HYDRALAZINE HYDROCHLORIDE 20 MG/ML
10 INJECTION INTRAMUSCULAR; INTRAVENOUS
Status: DISCONTINUED | OUTPATIENT
Start: 2023-09-29 | End: 2023-09-30 | Stop reason: HOSPADM

## 2023-09-29 RX ORDER — HYDRALAZINE HYDROCHLORIDE 20 MG/ML
INJECTION INTRAMUSCULAR; INTRAVENOUS
Status: COMPLETED
Start: 2023-09-29 | End: 2023-09-29

## 2023-09-29 RX ORDER — METOCLOPRAMIDE HYDROCHLORIDE 5 MG/ML
10 INJECTION INTRAMUSCULAR; INTRAVENOUS EVERY 6 HOURS PRN
Status: DISCONTINUED | OUTPATIENT
Start: 2023-09-29 | End: 2023-09-30 | Stop reason: HOSPADM

## 2023-09-29 RX ORDER — MAGNESIUM SULFATE HEPTAHYDRATE 40 MG/ML
4 INJECTION, SOLUTION INTRAVENOUS ONCE
Status: COMPLETED | OUTPATIENT
Start: 2023-09-29 | End: 2023-09-29

## 2023-09-29 RX ORDER — METOCLOPRAMIDE HYDROCHLORIDE 5 MG/ML
10 INJECTION INTRAMUSCULAR; INTRAVENOUS EVERY 6 HOURS PRN
Status: DISCONTINUED | OUTPATIENT
Start: 2023-09-29 | End: 2023-09-29 | Stop reason: HOSPADM

## 2023-09-29 RX ORDER — PROCHLORPERAZINE MALEATE 10 MG
10 TABLET ORAL EVERY 6 HOURS PRN
Status: DISCONTINUED | OUTPATIENT
Start: 2023-09-29 | End: 2023-09-30 | Stop reason: HOSPADM

## 2023-09-29 RX ORDER — MONTELUKAST SODIUM 10 MG/1
10 TABLET ORAL AT BEDTIME
Status: DISCONTINUED | OUTPATIENT
Start: 2023-09-29 | End: 2023-09-30 | Stop reason: HOSPADM

## 2023-09-29 RX ORDER — MAGNESIUM SULFATE HEPTAHYDRATE 40 MG/ML
INJECTION, SOLUTION INTRAVENOUS
Status: COMPLETED
Start: 2023-09-29 | End: 2023-09-29

## 2023-09-29 RX ORDER — MAGNESIUM SULFATE IN WATER 40 MG/ML
2 INJECTION, SOLUTION INTRAVENOUS CONTINUOUS
Status: DISCONTINUED | OUTPATIENT
Start: 2023-09-29 | End: 2023-09-30 | Stop reason: HOSPADM

## 2023-09-29 RX ORDER — ONDANSETRON 4 MG/1
4 TABLET, ORALLY DISINTEGRATING ORAL EVERY 6 HOURS PRN
Status: DISCONTINUED | OUTPATIENT
Start: 2023-09-29 | End: 2023-09-30 | Stop reason: HOSPADM

## 2023-09-29 RX ORDER — ALBUTEROL SULFATE 90 UG/1
2 AEROSOL, METERED RESPIRATORY (INHALATION) EVERY 6 HOURS PRN
Status: DISCONTINUED | OUTPATIENT
Start: 2023-09-29 | End: 2023-09-30 | Stop reason: HOSPADM

## 2023-09-29 RX ORDER — ACETAMINOPHEN 325 MG/1
650 TABLET ORAL EVERY 4 HOURS PRN
Status: DISCONTINUED | OUTPATIENT
Start: 2023-09-29 | End: 2023-09-30 | Stop reason: HOSPADM

## 2023-09-29 RX ORDER — ONDANSETRON 4 MG/1
4 TABLET, ORALLY DISINTEGRATING ORAL EVERY 6 HOURS PRN
Status: DISCONTINUED | OUTPATIENT
Start: 2023-09-29 | End: 2023-09-29 | Stop reason: HOSPADM

## 2023-09-29 RX ORDER — METOCLOPRAMIDE 10 MG/1
10 TABLET ORAL EVERY 6 HOURS PRN
Status: DISCONTINUED | OUTPATIENT
Start: 2023-09-29 | End: 2023-09-29 | Stop reason: HOSPADM

## 2023-09-29 RX ORDER — FLUTICASONE PROPIONATE 110 UG/1
1 AEROSOL, METERED RESPIRATORY (INHALATION) 2 TIMES DAILY
Status: DISCONTINUED | OUTPATIENT
Start: 2023-09-29 | End: 2023-09-30 | Stop reason: HOSPADM

## 2023-09-29 RX ORDER — PROCHLORPERAZINE 25 MG
25 SUPPOSITORY, RECTAL RECTAL EVERY 12 HOURS PRN
Status: DISCONTINUED | OUTPATIENT
Start: 2023-09-29 | End: 2023-09-29 | Stop reason: HOSPADM

## 2023-09-29 RX ORDER — BETAMETHASONE SODIUM PHOSPHATE AND BETAMETHASONE ACETATE 3; 3 MG/ML; MG/ML
12 INJECTION, SUSPENSION INTRA-ARTICULAR; INTRALESIONAL; INTRAMUSCULAR; SOFT TISSUE EVERY 24 HOURS
Status: DISCONTINUED | OUTPATIENT
Start: 2023-09-29 | End: 2023-09-30 | Stop reason: HOSPADM

## 2023-09-29 RX ORDER — PROCHLORPERAZINE 25 MG
25 SUPPOSITORY, RECTAL RECTAL EVERY 12 HOURS PRN
Status: DISCONTINUED | OUTPATIENT
Start: 2023-09-29 | End: 2023-09-30 | Stop reason: HOSPADM

## 2023-09-29 RX ORDER — MAGNESIUM SULFATE HEPTAHYDRATE 40 MG/ML
2 INJECTION, SOLUTION INTRAVENOUS
Status: DISCONTINUED | OUTPATIENT
Start: 2023-09-29 | End: 2023-09-30 | Stop reason: HOSPADM

## 2023-09-29 RX ORDER — NIFEDIPINE 10 MG/1
10-20 CAPSULE ORAL
Status: DISCONTINUED | OUTPATIENT
Start: 2023-09-29 | End: 2023-09-30 | Stop reason: HOSPADM

## 2023-09-29 RX ORDER — MAGNESIUM SULFATE HEPTAHYDRATE 40 MG/ML
4 INJECTION, SOLUTION INTRAVENOUS
Status: DISCONTINUED | OUTPATIENT
Start: 2023-09-29 | End: 2023-09-30 | Stop reason: HOSPADM

## 2023-09-29 RX ADMIN — MONTELUKAST 10 MG: 10 TABLET, FILM COATED ORAL at 23:37

## 2023-09-29 RX ADMIN — NIFEDIPINE 20 MG: 10 CAPSULE ORAL at 19:24

## 2023-09-29 RX ADMIN — NIFEDIPINE 10 MG: 10 CAPSULE ORAL at 18:12

## 2023-09-29 RX ADMIN — HYDRALAZINE HYDROCHLORIDE 10 MG: 20 INJECTION INTRAMUSCULAR; INTRAVENOUS at 17:18

## 2023-09-29 RX ADMIN — NIFEDIPINE 20 MG: 10 CAPSULE ORAL at 18:59

## 2023-09-29 RX ADMIN — NIFEDIPINE 20 MG: 10 CAPSULE ORAL at 18:36

## 2023-09-29 RX ADMIN — HYDRALAZINE HYDROCHLORIDE 10 MG: 20 INJECTION INTRAMUSCULAR; INTRAVENOUS at 17:46

## 2023-09-29 RX ADMIN — NICARDIPINE HYDROCHLORIDE 2.5 MG/HR: 0.2 INJECTION INTRAVENOUS at 21:29

## 2023-09-29 RX ADMIN — FLUTICASONE PROPIONATE 1 PUFF: 110 AEROSOL, METERED RESPIRATORY (INHALATION) at 21:32

## 2023-09-29 RX ADMIN — BETAMETHASONE SODIUM PHOSPHATE AND BETAMETHASONE ACETATE 12 MG: 3; 3 INJECTION, SUSPENSION INTRA-ARTICULAR; INTRALESIONAL; INTRAMUSCULAR at 17:57

## 2023-09-29 RX ADMIN — MAGNESIUM SULFATE HEPTAHYDRATE 2 G/HR: 40 INJECTION, SOLUTION INTRAVENOUS at 18:09

## 2023-09-29 RX ADMIN — MAGNESIUM SULFATE HEPTAHYDRATE 4 G: 40 INJECTION, SOLUTION INTRAVENOUS at 17:33

## 2023-09-29 RX ADMIN — SODIUM CHLORIDE, POTASSIUM CHLORIDE, SODIUM LACTATE AND CALCIUM CHLORIDE 50 ML/HR: 600; 310; 30; 20 INJECTION, SOLUTION INTRAVENOUS at 17:29

## 2023-09-29 ASSESSMENT — ACTIVITIES OF DAILY LIVING (ADL)
ADLS_ACUITY_SCORE: 20
ADLS_ACUITY_SCORE: 20
ADLS_ACUITY_SCORE: 35
ADLS_ACUITY_SCORE: 20

## 2023-09-29 NOTE — NURSING NOTE
NST performed due to newly diagnosed fetal growth restriction. Dr. Lowe reviewed efm tracing. See NST/BPP Doc Flowsheet tab.  Will start weekly surveillance with MFM.

## 2023-09-29 NOTE — CARE PLAN
pt admitted to [facility], ambulatory per services of Dr John for evaluation of preeclampsia. Pt arrived from [facility], pt had scheduled appt for evalualtion of IUGR, pts b/ps found to be 170-180/110's. Pt states no issues with high blood pressures prior to today. Pt states that she has noted a mild headache for the past week. Pt denies epigastric pain an or vision changed. Discussed plan of care including EFM with NST, routine VS, and labs.  Pt agreeable.  EFM applied and admission assessment completed.  Initial b/p 199/118, 10 minutes later b/p 191/96. No clonus noted, reflexes normal. Labs collected and sent. PIV placed. Dr John notified. Orders received, hydralazine given x 2. 4 gram load of mag started. Remained at bedside for 1 hour. 2 gram/hr mag continuous started. B/p remained above 160 systolic. Dr John notified, received orders to PO nifedipine. Dr John at bedside at 1720 to discuss plan of care and plan for transfer to [facility]. Nifedipine given x3, b/ps remain 170's/90's. Dr John updated at 1721. Received orders to give 4th dose of nifedipine. B/p 165/93 after 4th dose nifedipine. Dr John updated. Plan to transfer pt to ICU. NNP spoke with both parents. Pt transferred to ICU at 2100. Report given to receiving RN.

## 2023-09-29 NOTE — H&P
[facility]    History and Physical  Obstetrics and Gynecology     Date of Admission:  2023    History of Present Illness   Selena Dutton is a 32 year old female  29w3d  Estimated Date of Delivery: Dec 12, 2023 is calculated from Patient's last menstrual period was 2023 (exact date). [facility] blood pressure x 2 while at [facility] ultrasound visit.    Patient found on ultrasound for follow-up of LL placenta to have small HC 3%. Upon follow-up ultrasound at [facility] today, IUGR reported with EFW 9th%, BPs 170s-180s/100s-110s. Sent to L&D for further evaluation.  Patient c/o mild HA. Has had swelling in feet/legs/hands in the last week. No prior blood pressure abnormalities.  Asthma has not been well controlled this pregnancy. Uses albuterol several times per day. Her flovent, has always needed more than just albuterol. Has never seen a pulmonologist. Has been managed by primary care  +FM, no lof/vb.    PRENATAL COURSE  Anatomy ultrasound with HC 3%-ref'd to [facility]  Anxiety/depression on sertraline  Asthma suboptimal control with albuterol/flovent/singulair    Recent Labs   Lab Test 23  1359   AS Negative     Recent Labs   Lab Test 23  1359   HEPBANG Nonreactive   HIAGAB Nonreactive   RUQIGG Positive       ALT/AST:  Cr 0.83  Plt 159 < 177 on 23  Hgb 10.6 < 10.7 on 23  Pr:Cr: 0.67    Imaging:  Results for orders placed or performed in visit on 23   US OB >14 Weeks Follow Up    Narrative    Table formatting from the original result was not included.     US OB >14 Weeks Follow Up  Order #: [id number] Accession #: [id number]  Study Notes     Spurling, Brittnee on 2023  9:17 AM   a  Obstetrical Ultrasound Report  OB U/S Follow Up > 14 Weeks - Transabdominal  [facility]  Referring physician: Hazel Ghotra MD   Sonographer: Brittnee Spurling, RDMS  Indication:  F/U Growth     Dating (mm/dd/yyyy):   LMP: Patient's last  menstrual period was 03/07/2023 (exact date).        EDC:  Estimated Date of Delivery: Dec 12, 2023   GA by LMP:              28w0d  Current Scan On (mm/dd/yyyy):  9/19/2023                       EDC:   12/19/2023        GA by Current   Scan:      27w0d  The calculation of the gestational age by current scan was based on BPD,   HC, AC and FL.     Anatomy Scan:  Abernathy gestation.  Visualized: 4 Chamber Heart, Stomach, Kidneys, and Bladder.  Biometry:  BPD 6.7 cm 27w1d 14%   HC 24.7 cm 26w6d 3%   AC 23.2 cm 27w4d 30%   FL 4.9 cm 26w3d 5%   EFW (lbs/oz) 2 lbs               4ozs       EFW (g) 1025 g 13%        Fetal heart rate: 148 bpm  Fetal presentation: Cephalic  Amniotic fluid: 4.3 cm MVP  Placenta: Posterior , no previa, > 2 cm from internal os  Maternal Anatomy:  Right adnexa: wnl  Left adnexa: wnl  Impression:          EFW by today's ultrasound is 1025 grams, which is the 13%tile, however   head circumference is at 3rd%tile.   Normal MVP  Cephalic presentation  Placental location is now within normal limits.  No previa or low lying   placenta visualized.  Given 3%tile head circumference recommend evaluation with perinatology    Hazel Ghotra MD       Ultrasound with MFM pending from today. Per MFM IUGR EFW 9%. Cephalic.         Prior to Admission Medications   Prior to Admission Medications   Prescriptions Last Dose Informant Patient Reported? Taking?   Prenat w/o O-IN-[id number]-FA-DHA (PNV-DHA PO) 9/28/2023  Yes Yes   albuterol (PROAIR HFA/PROVENTIL HFA/VENTOLIN HFA) 108 (90 Base) MCG/ACT inhaler 9/29/2023  No Yes   Sig: Inhale 2 puffs into the lungs every 6 hours   fluticasone (FLOVENT HFA) 110 MCG/ACT inhaler 9/29/2023  No Yes   Sig: Inhale 1 puff into the lungs 2 times daily   montelukast (SINGULAIR) 10 MG tablet 9/28/2023  No Yes   Sig: Take 1 tablet (10 mg) by mouth At Bedtime   ondansetron (ZOFRAN) 4 MG tablet   No No   Sig: Take 1 tablet (4 mg) by mouth every 8 hours as needed for nausea   Patient not  taking: Reported on 7/26/2023   sertraline (ZOLOFT) 100 MG tablet 9/28/2023  No Yes   Sig: Take 1 tablet (100 mg) by mouth daily   vitamin B6 (PYRIDOXINE) 100 MG tablet   Yes No   Sig: Take 100 mg by mouth daily   Patient not taking: Reported on 7/26/2023      Facility-Administered Medications: None     Allergies   Allergies   Allergen Reactions    Nuts Anaphylaxis    Cat Hair Extract     Peanut (Diagnostic)          Immunization History   Immunization History   Administered Date(s) Administered    COVID-19 Bivalent 12+ (Pfizer) 01/14/2023    Influenza Vaccine >6 months (Alfuria,Fluzone) 09/19/2023    TDAP (Adacel,Boostrix) 09/19/2023       Past Medical History:   Diagnosis Date    Anxiety and depression     Moderate persistent asthma without complication        Past Surgical History:   Procedure Laterality Date    HC TOOTH EXTRACTION W/FORCEP      MOUTH SURGERY         O:   Vitals:    09/29/23 1720 09/29/23 1730 09/29/23 1740 09/29/23 1750   BP: (!) 191/96 (!) 182/96 (!) 165/98 (!) 170/100   Pulse:       Resp:       Temp:       SpO2:  99% 99%        Constitutional: healthy, alert, active and no distress   Extremities: NT, 1+ b/l LE edema  Neurologic: Awake, alert, oriented x3 normal refelxes  Neuropsychiatric: General: normal, calm and normal eye contact  Lungs: nonlabored breathing  Abdomen: soft, ND, NT, gravid, single vertex fetus    FHT: 150 appropriate for gestational age  Dugger: none    Assessment & Plan   Selena Dutton is a 32 year old female G1 @ 29w3d who is admitted for severe hypertension and preeclampsia evaluation with known fetal IUGR 9th%    -Severe HTN. Normal labs. Cr appears elevated for pregnancy, but has no known baseline. PrCr pending. Has HA.   Mag sulfate started.  Received hydralazine protocol, now moving on to nifedipine. Avoiding labetalol due to hx uncontrolled asthma.  [facility].   Discussed HTN and preeclampsia with patient and her  mother, discussed maternal/fetal risks, evaluation, treatment including potential need for delivery.   -IUGR 9th%. Reassuring fetal status at this time.  BMZ #1 given.   -Asthma. Not well controlled on current medication regimen. Not actively symptomatic, sats normal on RA. Will monitor and determine next steps/further eval pending management of HTN above.  -Anxiety/depression. Cont sertraline.  -patient given opportunity to ask questions, these were answered to her satisfaction    (45 minutes was spent on the date of the encounter doing chart review, review and interpretation of pertinent test results, history and/or exam, documentation, patient counseling.)      Selena Adamson Masters, DO

## 2023-09-29 NOTE — PROGRESS NOTES
"Please see \"Imaging\" tab under \"Chart Review\" for details of today's visit.    Jaison Lowe    "

## 2023-09-29 NOTE — NURSING NOTE
Patient presents to [facility] for L2 at 29w3d due to Small HC. Positive fetal movement. Denies LOF, vaginal bleeding or cramping/contractions. SBAR given to [facility] MD, see their note in Epic.     BP elevated x2 (see flowsheet). Denies HA but states she's noticed increased swelling and is wearing compression socks. Dr. Lowe met with patient. Plan for admission to  for further monitoring. Report given to KATHERINE Vasquez in LD. Dr Lowe spoke with Dr John. Patient walked to  LD room 232. RN in room upon arrival.

## 2023-09-30 ENCOUNTER — ANESTHESIA EVENT (OUTPATIENT)
Dept: OBGYN | Facility: CLINIC | Age: 33
End: 2023-09-30
Payer: COMMERCIAL

## 2023-09-30 ENCOUNTER — ANESTHESIA (OUTPATIENT)
Dept: OBGYN | Facility: CLINIC | Age: 33
End: 2023-09-30
Payer: COMMERCIAL

## 2023-09-30 ENCOUNTER — HOSPITAL ENCOUNTER (OUTPATIENT)
Facility: CLINIC | Age: 33
End: 2023-09-30
Attending: OBSTETRICS & GYNECOLOGY | Admitting: OBSTETRICS & GYNECOLOGY
Payer: COMMERCIAL

## 2023-09-30 ENCOUNTER — HOSPITAL ENCOUNTER (INPATIENT)
Facility: CLINIC | Age: 33
LOS: 7 days | Discharge: HOME | End: 2023-10-07
Attending: SURGERY | Admitting: OBSTETRICS & GYNECOLOGY
Payer: COMMERCIAL

## 2023-09-30 VITALS
HEART RATE: 98 BPM | RESPIRATION RATE: 22 BRPM | BODY MASS INDEX: 25.66 KG/M2 | SYSTOLIC BLOOD PRESSURE: 154 MMHG | TEMPERATURE: 98.4 F | DIASTOLIC BLOOD PRESSURE: 101 MMHG | OXYGEN SATURATION: 99 % | WEIGHT: 163.8 LBS

## 2023-09-30 DIAGNOSIS — Z98.891 S/P CESAREAN SECTION: Primary | ICD-10-CM

## 2023-09-30 DIAGNOSIS — O16.3 SEVERE HYPERTENSION AFFECTING PREGNANCY IN THIRD TRIMESTER: ICD-10-CM

## 2023-09-30 PROBLEM — O14.90 PRE-ECLAMPSIA: Status: ACTIVE | Noted: 2023-09-30

## 2023-09-30 LAB
ALBUMIN SERPL BCG-MCNC: 3.1 G/DL (ref 3.5–5.2)
ALBUMIN SERPL BCG-MCNC: 3.2 G/DL (ref 3.5–5.2)
ALBUMIN SERPL BCG-MCNC: 3.4 G/DL (ref 3.5–5.2)
ALP SERPL-CCNC: 138 U/L (ref 35–104)
ALP SERPL-CCNC: 141 U/L (ref 35–104)
ALP SERPL-CCNC: 141 U/L (ref 35–104)
ALT SERPL W P-5'-P-CCNC: 31 U/L (ref 0–50)
ANION GAP SERPL CALCULATED.3IONS-SCNC: 14 MMOL/L (ref 7–15)
ANION GAP SERPL CALCULATED.3IONS-SCNC: 15 MMOL/L (ref 7–15)
ANION GAP SERPL CALCULATED.3IONS-SCNC: 18 MMOL/L (ref 7–15)
AST SERPL W P-5'-P-CCNC: 39 U/L (ref 0–45)
AST SERPL W P-5'-P-CCNC: 40 U/L (ref 0–45)
AST SERPL W P-5'-P-CCNC: 41 U/L (ref 0–45)
B-OH-BUTYR SERPL-SCNC: 0.7 MMOL/L
B-OH-BUTYR SERPL-SCNC: 0.7 MMOL/L
BASE EXCESS BLDV CALC-SCNC: -4.3 MMOL/L (ref -7.7–1.9)
BASOPHILS # BLD AUTO: 0 10E3/UL (ref 0–0.2)
BASOPHILS NFR BLD AUTO: 0 %
BILIRUB DIRECT SERPL-MCNC: <0.2 MG/DL (ref 0–0.3)
BILIRUB SERPL-MCNC: 0.3 MG/DL
BUN SERPL-MCNC: 5.6 MG/DL (ref 6–20)
BUN SERPL-MCNC: 6.2 MG/DL (ref 6–20)
BUN SERPL-MCNC: 6.6 MG/DL (ref 6–20)
CALCIUM SERPL-MCNC: 6.4 MG/DL (ref 8.6–10)
CALCIUM SERPL-MCNC: 6.8 MG/DL (ref 8.6–10)
CALCIUM SERPL-MCNC: 7.5 MG/DL (ref 8.6–10)
CHLORIDE SERPL-SCNC: 100 MMOL/L (ref 98–107)
CHLORIDE SERPL-SCNC: 101 MMOL/L (ref 98–107)
CHLORIDE SERPL-SCNC: 102 MMOL/L (ref 98–107)
CREAT SERPL-MCNC: 0.68 MG/DL (ref 0.51–0.95)
CREAT SERPL-MCNC: 0.69 MG/DL (ref 0.51–0.95)
CREAT SERPL-MCNC: 0.75 MG/DL (ref 0.51–0.95)
CREAT SERPL-MCNC: 0.75 MG/DL (ref 0.51–0.95)
DEPRECATED HCO3 PLAS-SCNC: 16 MMOL/L (ref 22–29)
DEPRECATED HCO3 PLAS-SCNC: 16 MMOL/L (ref 22–29)
DEPRECATED HCO3 PLAS-SCNC: 19 MMOL/L (ref 22–29)
EGFRCR SERPLBLD CKD-EPI 2021: >90 ML/MIN/1.73M2
EOSINOPHIL # BLD AUTO: 0 10E3/UL (ref 0–0.7)
EOSINOPHIL NFR BLD AUTO: 0 %
ERYTHROCYTE [DISTWIDTH] IN BLOOD BY AUTOMATED COUNT: 13.9 % (ref 10–15)
GLUCOSE BLDC GLUCOMTR-MCNC: 105 MG/DL (ref 70–99)
GLUCOSE BLDC GLUCOMTR-MCNC: 109 MG/DL (ref 70–99)
GLUCOSE BLDC GLUCOMTR-MCNC: 161 MG/DL (ref 70–99)
GLUCOSE BLDC GLUCOMTR-MCNC: 98 MG/DL (ref 70–99)
GLUCOSE SERPL-MCNC: 121 MG/DL (ref 70–99)
GLUCOSE SERPL-MCNC: 146 MG/DL (ref 70–99)
GLUCOSE SERPL-MCNC: 172 MG/DL (ref 70–99)
HBA1C MFR BLD: 5.1 %
HCO3 BLDV-SCNC: 20 MMOL/L (ref 21–28)
HCT VFR BLD AUTO: 33 % (ref 35–47)
HGB BLD-MCNC: 11.3 G/DL (ref 11.7–15.7)
IMM GRANULOCYTES # BLD: 0.5 10E3/UL
IMM GRANULOCYTES NFR BLD: 3 %
LYMPHOCYTES # BLD AUTO: 1.2 10E3/UL (ref 0.8–5.3)
LYMPHOCYTES NFR BLD AUTO: 6 %
MCH RBC QN AUTO: 29.9 PG (ref 26.5–33)
MCHC RBC AUTO-ENTMCNC: 34.2 G/DL (ref 31.5–36.5)
MCV RBC AUTO: 87 FL (ref 78–100)
MONOCYTES # BLD AUTO: 0.3 10E3/UL (ref 0–1.3)
MONOCYTES NFR BLD AUTO: 2 %
NEUTROPHILS # BLD AUTO: 18 10E3/UL (ref 1.6–8.3)
NEUTROPHILS NFR BLD AUTO: 89 %
NRBC # BLD AUTO: 0 10E3/UL
NRBC BLD AUTO-RTO: 0 /100
O2/TOTAL GAS SETTING VFR VENT: 0 %
PCO2 BLDV: 32 MM HG (ref 40–50)
PH BLDV: 7.39 [PH] (ref 7.32–7.43)
PLATELET # BLD AUTO: 193 10E3/UL (ref 150–450)
PLATELET # BLD AUTO: 213 10E3/UL (ref 150–450)
PO2 BLDV: 48 MM HG (ref 25–47)
POTASSIUM SERPL-SCNC: 3.1 MMOL/L (ref 3.4–5.3)
POTASSIUM SERPL-SCNC: 3.1 MMOL/L (ref 3.4–5.3)
POTASSIUM SERPL-SCNC: 3.2 MMOL/L (ref 3.4–5.3)
POTASSIUM SERPL-SCNC: 3.2 MMOL/L (ref 3.4–5.3)
POTASSIUM SERPL-SCNC: 3.3 MMOL/L (ref 3.4–5.3)
PROT SERPL-MCNC: 6.2 G/DL (ref 6.4–8.3)
PROT SERPL-MCNC: 6.3 G/DL (ref 6.4–8.3)
PROT SERPL-MCNC: 6.4 G/DL (ref 6.4–8.3)
RBC # BLD AUTO: 3.78 10E6/UL (ref 3.8–5.2)
SODIUM SERPL-SCNC: 133 MMOL/L (ref 135–145)
SODIUM SERPL-SCNC: 134 MMOL/L (ref 135–145)
SODIUM SERPL-SCNC: 134 MMOL/L (ref 135–145)
WBC # BLD AUTO: 20.1 10E3/UL (ref 4–11)

## 2023-09-30 PROCEDURE — 84132 ASSAY OF SERUM POTASSIUM: CPT | Performed by: OBSTETRICS & GYNECOLOGY

## 2023-09-30 PROCEDURE — 82010 KETONE BODYS QUAN: CPT | Performed by: INTERNAL MEDICINE

## 2023-09-30 PROCEDURE — 250N000013 HC RX MED GY IP 250 OP 250 PS 637: Performed by: OBSTETRICS & GYNECOLOGY

## 2023-09-30 PROCEDURE — 84132 ASSAY OF SERUM POTASSIUM: CPT

## 2023-09-30 PROCEDURE — 250N000011 HC RX IP 250 OP 636: Mod: JZ | Performed by: OBSTETRICS & GYNECOLOGY

## 2023-09-30 PROCEDURE — 36415 COLL VENOUS BLD VENIPUNCTURE: CPT | Performed by: OBSTETRICS & GYNECOLOGY

## 2023-09-30 PROCEDURE — 59025 FETAL NON-STRESS TEST: CPT | Mod: 26 | Performed by: OBSTETRICS & GYNECOLOGY

## 2023-09-30 PROCEDURE — 250N000011 HC RX IP 250 OP 636: Mod: JZ | Performed by: INTERNAL MEDICINE

## 2023-09-30 PROCEDURE — 250N000013 HC RX MED GY IP 250 OP 250 PS 637: Performed by: STUDENT IN AN ORGANIZED HEALTH CARE EDUCATION/TRAINING PROGRAM

## 2023-09-30 PROCEDURE — 250N000013 HC RX MED GY IP 250 OP 250 PS 637: Performed by: INTERNAL MEDICINE

## 2023-09-30 PROCEDURE — 258N000003 HC RX IP 258 OP 636

## 2023-09-30 PROCEDURE — 36415 COLL VENOUS BLD VENIPUNCTURE: CPT | Performed by: INTERNAL MEDICINE

## 2023-09-30 PROCEDURE — 250N000011 HC RX IP 250 OP 636: Mod: JZ

## 2023-09-30 PROCEDURE — 258N000003 HC RX IP 258 OP 636: Performed by: INTERNAL MEDICINE

## 2023-09-30 PROCEDURE — 85014 HEMATOCRIT: CPT | Performed by: OBSTETRICS & GYNECOLOGY

## 2023-09-30 PROCEDURE — 82310 ASSAY OF CALCIUM: CPT | Performed by: INTERNAL MEDICINE

## 2023-09-30 PROCEDURE — 250N000013 HC RX MED GY IP 250 OP 250 PS 637

## 2023-09-30 PROCEDURE — 82803 BLOOD GASES ANY COMBINATION: CPT | Performed by: INTERNAL MEDICINE

## 2023-09-30 PROCEDURE — 99232 SBSQ HOSP IP/OBS MODERATE 35: CPT | Performed by: OBSTETRICS & GYNECOLOGY

## 2023-09-30 PROCEDURE — 36415 COLL VENOUS BLD VENIPUNCTURE: CPT

## 2023-09-30 PROCEDURE — 200N000002 HC R&B ICU UMMC

## 2023-09-30 PROCEDURE — 85049 AUTOMATED PLATELET COUNT: CPT | Performed by: INTERNAL MEDICINE

## 2023-09-30 PROCEDURE — 99223 1ST HOSP IP/OBS HIGH 75: CPT

## 2023-09-30 PROCEDURE — 99233 SBSQ HOSP IP/OBS HIGH 50: CPT | Performed by: INTERNAL MEDICINE

## 2023-09-30 PROCEDURE — 83036 HEMOGLOBIN GLYCOSYLATED A1C: CPT | Performed by: INTERNAL MEDICINE

## 2023-09-30 PROCEDURE — 82010 KETONE BODYS QUAN: CPT

## 2023-09-30 PROCEDURE — 250N000011 HC RX IP 250 OP 636: Performed by: STUDENT IN AN ORGANIZED HEALTH CARE EDUCATION/TRAINING PROGRAM

## 2023-09-30 PROCEDURE — 82565 ASSAY OF CREATININE: CPT | Performed by: INTERNAL MEDICINE

## 2023-09-30 PROCEDURE — 82248 BILIRUBIN DIRECT: CPT | Performed by: OBSTETRICS & GYNECOLOGY

## 2023-09-30 PROCEDURE — 999N000128 HC STATISTIC PERIPHERAL IV START W/O US GUIDANCE

## 2023-09-30 PROCEDURE — 258N000003 HC RX IP 258 OP 636: Performed by: OBSTETRICS & GYNECOLOGY

## 2023-09-30 RX ORDER — SODIUM CHLORIDE, SODIUM LACTATE, POTASSIUM CHLORIDE, CALCIUM CHLORIDE 600; 310; 30; 20 MG/100ML; MG/100ML; MG/100ML; MG/100ML
10-125 INJECTION, SOLUTION INTRAVENOUS CONTINUOUS
Status: DISCONTINUED | OUTPATIENT
Start: 2023-09-30 | End: 2023-09-30

## 2023-09-30 RX ORDER — ONDANSETRON 4 MG/1
4 TABLET, ORALLY DISINTEGRATING ORAL EVERY 6 HOURS PRN
Status: CANCELLED | OUTPATIENT
Start: 2023-09-30

## 2023-09-30 RX ORDER — LIDOCAINE 40 MG/G
CREAM TOPICAL
Status: CANCELLED | OUTPATIENT
Start: 2023-09-30

## 2023-09-30 RX ORDER — METOCLOPRAMIDE 10 MG/1
10 TABLET ORAL EVERY 6 HOURS PRN
Status: DISCONTINUED | OUTPATIENT
Start: 2023-09-30 | End: 2023-10-03

## 2023-09-30 RX ORDER — ALBUTEROL SULFATE 90 UG/1
2 AEROSOL, METERED RESPIRATORY (INHALATION) EVERY 6 HOURS PRN
Status: DISCONTINUED | OUTPATIENT
Start: 2023-09-30 | End: 2023-10-07 | Stop reason: HOSPADM

## 2023-09-30 RX ORDER — NIFEDIPINE 30 MG/1
30 TABLET, EXTENDED RELEASE ORAL 2 TIMES DAILY
Status: DISCONTINUED | OUTPATIENT
Start: 2023-09-30 | End: 2023-09-30

## 2023-09-30 RX ORDER — NICOTINE POLACRILEX 4 MG
15-30 LOZENGE BUCCAL
Status: DISCONTINUED | OUTPATIENT
Start: 2023-09-30 | End: 2023-10-01

## 2023-09-30 RX ORDER — HYDRALAZINE HYDROCHLORIDE 20 MG/ML
10 INJECTION INTRAMUSCULAR; INTRAVENOUS
Status: DISCONTINUED | OUTPATIENT
Start: 2023-09-30 | End: 2023-09-30

## 2023-09-30 RX ORDER — PROCHLORPERAZINE 25 MG
25 SUPPOSITORY, RECTAL RECTAL EVERY 12 HOURS PRN
Status: CANCELLED | OUTPATIENT
Start: 2023-09-30

## 2023-09-30 RX ORDER — ACETAMINOPHEN 325 MG/1
650 TABLET ORAL EVERY 4 HOURS PRN
Status: CANCELLED | OUTPATIENT
Start: 2023-09-30

## 2023-09-30 RX ORDER — ACETAMINOPHEN 325 MG/1
650 TABLET ORAL EVERY 4 HOURS PRN
Status: DISCONTINUED | OUTPATIENT
Start: 2023-09-30 | End: 2023-10-03

## 2023-09-30 RX ORDER — NIFEDIPINE 10 MG/1
10-20 CAPSULE ORAL
Status: CANCELLED | OUTPATIENT
Start: 2023-09-30

## 2023-09-30 RX ORDER — HYDRALAZINE HYDROCHLORIDE 20 MG/ML
10 INJECTION INTRAMUSCULAR; INTRAVENOUS
Status: CANCELLED | OUTPATIENT
Start: 2023-09-30

## 2023-09-30 RX ORDER — HYDRALAZINE HYDROCHLORIDE 20 MG/ML
10 INJECTION INTRAMUSCULAR; INTRAVENOUS
Status: DISCONTINUED | OUTPATIENT
Start: 2023-09-30 | End: 2023-10-07 | Stop reason: HOSPADM

## 2023-09-30 RX ORDER — ONDANSETRON 2 MG/ML
4 INJECTION INTRAMUSCULAR; INTRAVENOUS EVERY 6 HOURS PRN
Status: DISCONTINUED | OUTPATIENT
Start: 2023-09-30 | End: 2023-10-03

## 2023-09-30 RX ORDER — PROCHLORPERAZINE MALEATE 5 MG
10 TABLET ORAL EVERY 6 HOURS PRN
Status: DISCONTINUED | OUTPATIENT
Start: 2023-09-30 | End: 2023-10-03

## 2023-09-30 RX ORDER — LABETALOL HYDROCHLORIDE 5 MG/ML
20-80 INJECTION, SOLUTION INTRAVENOUS EVERY 10 MIN PRN
Status: DISCONTINUED | OUTPATIENT
Start: 2023-09-30 | End: 2023-10-01

## 2023-09-30 RX ORDER — MAGNESIUM SULFATE HEPTAHYDRATE 40 MG/ML
4 INJECTION, SOLUTION INTRAVENOUS
Status: DISCONTINUED | OUTPATIENT
Start: 2023-09-30 | End: 2023-10-03

## 2023-09-30 RX ORDER — NIFEDIPINE 30 MG/1
30 TABLET, EXTENDED RELEASE ORAL 2 TIMES DAILY
Status: CANCELLED | OUTPATIENT
Start: 2023-09-30

## 2023-09-30 RX ORDER — ENOXAPARIN SODIUM 100 MG/ML
40 INJECTION SUBCUTANEOUS EVERY 24 HOURS
Status: DISCONTINUED | OUTPATIENT
Start: 2023-09-30 | End: 2023-10-02

## 2023-09-30 RX ORDER — POTASSIUM CHLORIDE 1500 MG/1
40 TABLET, EXTENDED RELEASE ORAL ONCE
Status: COMPLETED | OUTPATIENT
Start: 2023-10-01 | End: 2023-09-30

## 2023-09-30 RX ORDER — PROCHLORPERAZINE MALEATE 10 MG
10 TABLET ORAL EVERY 6 HOURS PRN
Status: CANCELLED | OUTPATIENT
Start: 2023-09-30

## 2023-09-30 RX ORDER — ONDANSETRON 4 MG/1
4 TABLET, ORALLY DISINTEGRATING ORAL EVERY 6 HOURS PRN
Status: DISCONTINUED | OUTPATIENT
Start: 2023-09-30 | End: 2023-10-03

## 2023-09-30 RX ORDER — MONTELUKAST SODIUM 10 MG/1
10 TABLET ORAL AT BEDTIME
Status: CANCELLED | OUTPATIENT
Start: 2023-09-30

## 2023-09-30 RX ORDER — POTASSIUM CHLORIDE 1500 MG/1
40 TABLET, EXTENDED RELEASE ORAL ONCE
Status: COMPLETED | OUTPATIENT
Start: 2023-09-30 | End: 2023-09-30

## 2023-09-30 RX ORDER — DEXTROSE MONOHYDRATE 25 G/50ML
25-50 INJECTION, SOLUTION INTRAVENOUS
Status: CANCELLED | OUTPATIENT
Start: 2023-09-30

## 2023-09-30 RX ORDER — NIFEDIPINE 30 MG/1
30 TABLET, EXTENDED RELEASE ORAL 2 TIMES DAILY
Status: DISCONTINUED | OUTPATIENT
Start: 2023-09-30 | End: 2023-09-30 | Stop reason: HOSPADM

## 2023-09-30 RX ORDER — NICOTINE POLACRILEX 4 MG
15-30 LOZENGE BUCCAL
Status: CANCELLED | OUTPATIENT
Start: 2023-09-30

## 2023-09-30 RX ORDER — NIFEDIPINE 30 MG/1
30 TABLET, EXTENDED RELEASE ORAL DAILY
Status: DISCONTINUED | OUTPATIENT
Start: 2023-10-01 | End: 2023-10-01

## 2023-09-30 RX ORDER — MAGNESIUM SULFATE HEPTAHYDRATE 40 MG/ML
2 INJECTION, SOLUTION INTRAVENOUS
Status: COMPLETED | OUTPATIENT
Start: 2023-09-30 | End: 2023-10-02

## 2023-09-30 RX ORDER — METOCLOPRAMIDE 10 MG/1
10 TABLET ORAL EVERY 6 HOURS PRN
Status: CANCELLED | OUTPATIENT
Start: 2023-09-30

## 2023-09-30 RX ORDER — MAGNESIUM SULFATE HEPTAHYDRATE 40 MG/ML
4 INJECTION, SOLUTION INTRAVENOUS
Status: CANCELLED | OUTPATIENT
Start: 2023-09-30

## 2023-09-30 RX ORDER — DOCUSATE SODIUM 100 MG/1
100 CAPSULE, LIQUID FILLED ORAL 2 TIMES DAILY PRN
Status: CANCELLED | OUTPATIENT
Start: 2023-09-30

## 2023-09-30 RX ORDER — DOCUSATE SODIUM 100 MG/1
100 CAPSULE, LIQUID FILLED ORAL 2 TIMES DAILY PRN
Status: DISCONTINUED | OUTPATIENT
Start: 2023-09-30 | End: 2023-10-03

## 2023-09-30 RX ORDER — NICOTINE POLACRILEX 4 MG
15-30 LOZENGE BUCCAL
Status: DISCONTINUED | OUTPATIENT
Start: 2023-09-30 | End: 2023-10-03

## 2023-09-30 RX ORDER — DEXTROSE MONOHYDRATE 50 MG/ML
INJECTION, SOLUTION INTRAVENOUS CONTINUOUS
Status: DISCONTINUED | OUTPATIENT
Start: 2023-09-30 | End: 2023-10-01

## 2023-09-30 RX ORDER — MONTELUKAST SODIUM 10 MG/1
10 TABLET ORAL AT BEDTIME
Status: DISCONTINUED | OUTPATIENT
Start: 2023-09-30 | End: 2023-10-07 | Stop reason: HOSPADM

## 2023-09-30 RX ORDER — ALBUTEROL SULFATE 90 UG/1
2 AEROSOL, METERED RESPIRATORY (INHALATION) EVERY 6 HOURS PRN
Status: CANCELLED | OUTPATIENT
Start: 2023-09-30

## 2023-09-30 RX ORDER — DEXTROSE MONOHYDRATE 25 G/50ML
25-50 INJECTION, SOLUTION INTRAVENOUS
Status: DISCONTINUED | OUTPATIENT
Start: 2023-09-30 | End: 2023-10-01

## 2023-09-30 RX ORDER — DEXTROSE MONOHYDRATE 25 G/50ML
25-50 INJECTION, SOLUTION INTRAVENOUS
Status: DISCONTINUED | OUTPATIENT
Start: 2023-09-30 | End: 2023-09-30 | Stop reason: HOSPADM

## 2023-09-30 RX ORDER — SERTRALINE HYDROCHLORIDE 100 MG/1
100 TABLET, FILM COATED ORAL DAILY
Status: DISCONTINUED | OUTPATIENT
Start: 2023-09-30 | End: 2023-10-07 | Stop reason: HOSPADM

## 2023-09-30 RX ORDER — NICOTINE POLACRILEX 4 MG
15-30 LOZENGE BUCCAL
Status: DISCONTINUED | OUTPATIENT
Start: 2023-09-30 | End: 2023-09-30 | Stop reason: HOSPADM

## 2023-09-30 RX ORDER — BETAMETHASONE SODIUM PHOSPHATE AND BETAMETHASONE ACETATE 3; 3 MG/ML; MG/ML
12 INJECTION, SUSPENSION INTRA-ARTICULAR; INTRALESIONAL; INTRAMUSCULAR; SOFT TISSUE EVERY 24 HOURS
Status: COMPLETED | OUTPATIENT
Start: 2023-09-30 | End: 2023-09-30

## 2023-09-30 RX ORDER — SODIUM CHLORIDE, SODIUM LACTATE, POTASSIUM CHLORIDE, CALCIUM CHLORIDE 600; 310; 30; 20 MG/100ML; MG/100ML; MG/100ML; MG/100ML
10-125 INJECTION, SOLUTION INTRAVENOUS CONTINUOUS
Status: CANCELLED | OUTPATIENT
Start: 2023-09-30

## 2023-09-30 RX ORDER — DEXTROSE MONOHYDRATE 25 G/50ML
25-50 INJECTION, SOLUTION INTRAVENOUS
Status: DISCONTINUED | OUTPATIENT
Start: 2023-09-30 | End: 2023-10-03

## 2023-09-30 RX ORDER — DOCUSATE SODIUM 100 MG/1
100 CAPSULE, LIQUID FILLED ORAL 2 TIMES DAILY PRN
Status: DISCONTINUED | OUTPATIENT
Start: 2023-09-30 | End: 2023-09-30 | Stop reason: HOSPADM

## 2023-09-30 RX ORDER — CALCIUM GLUCONATE 94 MG/ML
1 INJECTION, SOLUTION INTRAVENOUS
Status: DISCONTINUED | OUTPATIENT
Start: 2023-09-30 | End: 2023-10-03

## 2023-09-30 RX ORDER — FLUTICASONE PROPIONATE 110 UG/1
1 AEROSOL, METERED RESPIRATORY (INHALATION) 2 TIMES DAILY
Status: CANCELLED | OUTPATIENT
Start: 2023-09-30

## 2023-09-30 RX ORDER — LIDOCAINE 40 MG/G
CREAM TOPICAL
Status: DISCONTINUED | OUTPATIENT
Start: 2023-09-30 | End: 2023-10-03

## 2023-09-30 RX ORDER — MAGNESIUM SULFATE IN WATER 40 MG/ML
2 INJECTION, SOLUTION INTRAVENOUS CONTINUOUS
Status: DISCONTINUED | OUTPATIENT
Start: 2023-09-30 | End: 2023-10-03

## 2023-09-30 RX ORDER — BETAMETHASONE SODIUM PHOSPHATE AND BETAMETHASONE ACETATE 3; 3 MG/ML; MG/ML
12 INJECTION, SUSPENSION INTRA-ARTICULAR; INTRALESIONAL; INTRAMUSCULAR; SOFT TISSUE EVERY 24 HOURS
Status: CANCELLED | OUTPATIENT
Start: 2023-09-30 | End: 2023-10-01

## 2023-09-30 RX ORDER — ONDANSETRON 2 MG/ML
4 INJECTION INTRAMUSCULAR; INTRAVENOUS EVERY 6 HOURS PRN
Status: DISCONTINUED | OUTPATIENT
Start: 2023-09-30 | End: 2023-10-01

## 2023-09-30 RX ORDER — ONDANSETRON 2 MG/ML
4 INJECTION INTRAMUSCULAR; INTRAVENOUS EVERY 6 HOURS PRN
Status: CANCELLED | OUTPATIENT
Start: 2023-09-30

## 2023-09-30 RX ORDER — CALCIUM GLUCONATE 94 MG/ML
1 INJECTION, SOLUTION INTRAVENOUS
Status: CANCELLED | OUTPATIENT
Start: 2023-09-30

## 2023-09-30 RX ORDER — MAGNESIUM SULFATE HEPTAHYDRATE 40 MG/ML
2 INJECTION, SOLUTION INTRAVENOUS
Status: CANCELLED | OUTPATIENT
Start: 2023-09-30

## 2023-09-30 RX ORDER — LIDOCAINE 40 MG/G
CREAM TOPICAL
Status: DISCONTINUED | OUTPATIENT
Start: 2023-09-30 | End: 2023-10-01

## 2023-09-30 RX ORDER — NIFEDIPINE 10 MG/1
10-20 CAPSULE ORAL
Status: DISCONTINUED | OUTPATIENT
Start: 2023-09-30 | End: 2023-09-30

## 2023-09-30 RX ORDER — PROCHLORPERAZINE 25 MG
25 SUPPOSITORY, RECTAL RECTAL EVERY 12 HOURS PRN
Status: DISCONTINUED | OUTPATIENT
Start: 2023-09-30 | End: 2023-10-03

## 2023-09-30 RX ORDER — METOCLOPRAMIDE HYDROCHLORIDE 5 MG/ML
10 INJECTION INTRAMUSCULAR; INTRAVENOUS EVERY 6 HOURS PRN
Status: DISCONTINUED | OUTPATIENT
Start: 2023-09-30 | End: 2023-10-03

## 2023-09-30 RX ORDER — ONDANSETRON 4 MG/1
4 TABLET, ORALLY DISINTEGRATING ORAL EVERY 6 HOURS PRN
Status: DISCONTINUED | OUTPATIENT
Start: 2023-09-30 | End: 2023-10-01

## 2023-09-30 RX ORDER — FLUTICASONE PROPIONATE 110 UG/1
1 AEROSOL, METERED RESPIRATORY (INHALATION) 2 TIMES DAILY
Status: DISCONTINUED | OUTPATIENT
Start: 2023-09-30 | End: 2023-10-03

## 2023-09-30 RX ORDER — MAGNESIUM SULFATE IN WATER 40 MG/ML
2 INJECTION, SOLUTION INTRAVENOUS CONTINUOUS
Status: CANCELLED | OUTPATIENT
Start: 2023-09-30

## 2023-09-30 RX ORDER — METOCLOPRAMIDE HYDROCHLORIDE 5 MG/ML
10 INJECTION INTRAMUSCULAR; INTRAVENOUS EVERY 6 HOURS PRN
Status: CANCELLED | OUTPATIENT
Start: 2023-09-30

## 2023-09-30 RX ADMIN — DEXTROSE MONOHYDRATE: 50 INJECTION, SOLUTION INTRAVENOUS at 19:53

## 2023-09-30 RX ADMIN — POTASSIUM CHLORIDE 30 MEQ: 1500 TABLET, EXTENDED RELEASE ORAL at 09:43

## 2023-09-30 RX ADMIN — NIFEDIPINE 60 MG: 60 TABLET, EXTENDED RELEASE ORAL at 18:36

## 2023-09-30 RX ADMIN — MAGNESIUM SULFATE HEPTAHYDRATE 2 G/HR: 40 INJECTION, SOLUTION INTRAVENOUS at 12:38

## 2023-09-30 RX ADMIN — SODIUM CHLORIDE, POTASSIUM CHLORIDE, SODIUM LACTATE AND CALCIUM CHLORIDE 50 ML/HR: 600; 310; 30; 20 INJECTION, SOLUTION INTRAVENOUS at 16:13

## 2023-09-30 RX ADMIN — ALBUTEROL SULFATE 2 PUFF: 90 INHALANT RESPIRATORY (INHALATION) at 09:57

## 2023-09-30 RX ADMIN — ALBUTEROL SULFATE 2 PUFF: 90 INHALANT RESPIRATORY (INHALATION) at 23:45

## 2023-09-30 RX ADMIN — POTASSIUM CHLORIDE 40 MEQ: 1500 TABLET, EXTENDED RELEASE ORAL at 19:48

## 2023-09-30 RX ADMIN — BETAMETHASONE ACETATE AND BETAMETHASONE SODIUM PHOSPHATE 12 MG: 3; 3 INJECTION, SUSPENSION INTRA-ARTICULAR; INTRALESIONAL; INTRAMUSCULAR; SOFT TISSUE at 17:14

## 2023-09-30 RX ADMIN — NIFEDIPINE 30 MG: 30 TABLET, FILM COATED, EXTENDED RELEASE ORAL at 09:43

## 2023-09-30 RX ADMIN — MAGNESIUM SULFATE HEPTAHYDRATE 2 G/HR: 40 INJECTION, SOLUTION INTRAVENOUS at 04:25

## 2023-09-30 RX ADMIN — MONTELUKAST 10 MG: 10 TABLET, FILM COATED ORAL at 22:04

## 2023-09-30 RX ADMIN — FLUTICASONE PROPIONATE 1 PUFF: 110 AEROSOL, METERED RESPIRATORY (INHALATION) at 09:43

## 2023-09-30 RX ADMIN — MAGNESIUM SULFATE HEPTAHYDRATE 2 G/HR: 40 INJECTION, SOLUTION INTRAVENOUS at 16:12

## 2023-09-30 RX ADMIN — POTASSIUM CHLORIDE 40 MEQ: 1500 TABLET, EXTENDED RELEASE ORAL at 23:55

## 2023-09-30 RX ADMIN — SERTRALINE HYDROCHLORIDE 100 MG: 100 TABLET ORAL at 22:04

## 2023-09-30 RX ADMIN — SERTRALINE HYDROCHLORIDE 100 MG: 50 TABLET ORAL at 07:32

## 2023-09-30 RX ADMIN — SODIUM CHLORIDE, POTASSIUM CHLORIDE, SODIUM LACTATE AND CALCIUM CHLORIDE 50 ML/HR: 600; 310; 30; 20 INJECTION, SOLUTION INTRAVENOUS at 12:01

## 2023-09-30 RX ADMIN — ENOXAPARIN SODIUM 40 MG: 40 INJECTION SUBCUTANEOUS at 17:02

## 2023-09-30 RX ADMIN — NICARDIPINE HYDROCHLORIDE 2.5 MG/HR: 0.2 INJECTION INTRAVENOUS at 06:14

## 2023-09-30 RX ADMIN — MAGNESIUM SULFATE HEPTAHYDRATE 2 G/HR: 40 INJECTION, SOLUTION INTRAVENOUS at 22:46

## 2023-09-30 ASSESSMENT — ACTIVITIES OF DAILY LIVING (ADL)
VISION_MANAGEMENT: CONTACTS
ADLS_ACUITY_SCORE: 20
FALL_HISTORY_WITHIN_LAST_SIX_MONTHS: NO
ADLS_ACUITY_SCORE: 20
CONCENTRATING,_REMEMBERING_OR_MAKING_DECISIONS_DIFFICULTY: NO
TOILETING_ISSUES: NO
WEAR_GLASSES_OR_BLIND: YES
ADLS_ACUITY_SCORE: 20
ADLS_ACUITY_SCORE: 20
WALKING_OR_CLIMBING_STAIRS_DIFFICULTY: NO
ADLS_ACUITY_SCORE: 35
ADLS_ACUITY_SCORE: 20
ADLS_ACUITY_SCORE: 22
ADLS_ACUITY_SCORE: 20
CHANGE_IN_FUNCTIONAL_STATUS_SINCE_ONSET_OF_CURRENT_ILLNESS/INJURY: NO
DRESSING/BATHING_DIFFICULTY: NO
DOING_ERRANDS_INDEPENDENTLY_DIFFICULTY: NO
ADLS_ACUITY_SCORE: 20
DIFFICULTY_EATING/SWALLOWING: NO
ADLS_ACUITY_SCORE: 20
ADLS_ACUITY_SCORE: 20

## 2023-09-30 NOTE — H&P
[facility] ICU H&P  2023    Date of Hospital Admission:  Date of ICU Admission: 2023   Reason for Critical Care Admission: Pre-Eclampsia + HAGMA  Date of Service (when I saw the patient): 2023    ASSESSMENT: Selena Dutton is a 32 year old female with a medical history significant for asthma who is  29 weeks 3 days who initially presented to [facility] on 2023 following an US appointment where her Blood pressures were found to be 170s-180s/100s-110s. She was started on nicardipine and magnesium infusions with resolution of her hypertension. The morning of 2023 saw a rise in her anion gap and serum ketones and she was ultimately transferred to [facility] ICU for co-management with the M team and the ICU team for starvation ketosis and pre-eclampsia.     PLAN:    Neuro:  # Depression  ~ Initially reported HA with her hypertension, now resolved.   Monitor Neurological Status  PTA sertraline, 100 mg daily    Pulmonary:  # Asthma  ~ Not well controlled during pregnancy.  SpO2 > 95%  Supplemental Oxygen as needed   PTA albuterol inhaler, 2 puff every 6 hours  PTA montelukast, 10 mg at bedtime    Cardiovascular:  # Hypertension  ~ Started on a nicardipine infusion at [facility] due to BPs 180s/110s, now has been off the drip.   SBP goal < 160  MAP goal > 65  Cardiac Monitoring  Nifedipine, 30 mg in the AM, 60 mg in the PM  OB Short Acting AntiHypertensive protocols  Labetalol, 20 mg followed up 40 mg in 20 minutes if blood pressures remain out of target range  Continue to give labetalol every 20 minutes, doubling the dose each time, until blood pressure returns to target range.   Hydralazine, 10 mg every 20 mins as needed for SBP > 160    GI/Nutrition:  # Nausea  Diet: Clear liquid diet (precautionary in case she needs to deliver)  Zofran, 4 mg IV as needed for nausea    Renal/Fluids/Electrolytes:  # HAGMA  ~ Likely secondary to starvation ketosis  ~ Anion gap up to 18 at [facility],  now down to 14 on arrival to ICU.  Daily Weights  Monitor I&O  RN Managed Electrolyte Replacement protocols  CMP, daily and on arrival to ICU    Endocrine:  # Starvation Ketosis  ~ Serum ketones up to 0.70 this morning at [facility], remain at 0.70 after arrival to ICU.  ~ Given patient's poor oral intake and recent nausea, most likely secondary to starvation rather than undiagnosed diabetes.   If patient cannot tolerate dinner, should start Dextrose infusion   Sliding scale insulin, low resistance, before meals and bedtime  Check serum ketones in the AM    Obstetrics  #  29 weeks 3 days  # Pre-Eclampsia  Hypertensive management per cardiology section  Continue to monitor platelets, creatinine, and LFTs  Continue Magnesium infusion  Betamethasone, 12 mg (this till be the second of her two doses)  Monitor pre-eclampsia labs  Fetal monitoring per ante-partum nursing staff    ID:  # No current concerns  Monitor fever curve and WBCs    Musculoskeletal:  # No Current Concerns  Up ad bre    Skin:  # No Current Concerns  Diligent skin cares per nursing    General Cares/Prophylaxis:    DVT Prophylaxis: Pneumatic Compression Devices (Holding enoxaparin for now with possibility of )  GI Prophylaxis: Not indicated  Restraints: Not Indicated  Family Communication: updated per patient  Code Status: Full Code    Lines/tubes/drains:  - PIV x 2    Disposition:  ~ [facility] ICU  ~ May transfer to Ante-Partum tomorrow if acidosis clears.     Patient seen and findings/plan discussed with Dr. Magallon of the [facility] team.    SUSAN Martinez-[facility]  Pager #[phone]  Critical Care  [facility]     -----------------------------------------------------------------------    HISTORY PRESENTING ILLNESS:     Per chart review: Patient discovered to be hypertensive at her scheduled US apt on the . She was admitted to the hospital and transferred to the ICU for a nicardipine infusion at [facility]. While in the ICU it was discovered that she had an anion gap acidosis. Undetermined if it was diabetic ketoacidosis in the setting of pregnancy and receiving betamethasone. Patient has not been eating well and has been routinely nauseated during her pregnancy. She also recently passes her glycemic testing and her BG were < 180 despite the steroids.     REVIEW OF SYSTEMS:      ROS: 10 point ROS neg other than the symptoms noted above in the HPI.     PAST MEDICAL HISTORY:   Past Medical History:   Diagnosis Date    Anxiety and depression     Moderate persistent asthma without complication      SURGICAL HISTORY:  Past Surgical History:   Procedure Laterality Date    HC TOOTH EXTRACTION W/FORCEP      MOUTH SURGERY       SOCIAL HISTORY:  Social History     Socioeconomic History    Marital status:      Spouse name: Jhon   Occupational History    Occupation: Human Resources   Tobacco Use    Smoking status: Never    Smokeless tobacco: Never   Vaping Use    Vaping Use: Never used   Substance and Sexual Activity    Alcohol use: Not Currently     Comment: 3 drinks/week    Drug use: Never    Sexual activity: Yes     Partners: Male     Birth control/protection: None   Social History Narrative    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     Lives in [place] with her  and dog.     Bought a house in [facility] [place] [facility] this Spring, closes in 2 weeks.      born in [place] - [employer] [facility].         Free time: puppy havenese named Michelle.         Sary Meyer, DNP, APRN, CNP    06/01/22     FAMILY HISTORY:   Family History   Problem Relation Age of Onset    Osteoporosis Mother     Heart Disease Father     No Known Problems Brother     Cholelithiasis Maternal Grandmother     Myocardial Infarction Maternal Grandfather     Skin Cancer Paternal Grandmother     Diabetes Type 2  Paternal Grandfather     Transient ischemic attack Maternal Uncle      ALLERGIES:   Allergies   Allergen Reactions    Nuts  Anaphylaxis    Cat Hair Extract     Peanut (Diagnostic)      MEDICATIONS:  acetaminophen (TYLENOL) tablet 650 mg  albuterol (PROVENTIL HFA/VENTOLIN HFA) inhaler  betamethasone acet & sod phos (CELESTONE) injection 12 mg  calcium gluconate 10 % injection 1 g  glucose gel 15-30 g   Or  dextrose 50 % injection 25-50 mL   Or  glucagon injection 1 mg  docusate sodium (COLACE) capsule 100 mg  fluticasone (FLOVENT HFA) 110 MCG/ACT Inhaler 1 puff  hydrALAZINE (APRESOLINE) injection 10 mg  insulin aspart (NovoLOG) injection (RAPID ACTING)  insulin aspart (NovoLOG) injection (RAPID ACTING)  lactated ringers infusion  lidocaine (LMX4) cream  lidocaine 1 % 0.1-1 mL  magnesium sulfate 2 g in 50 mL sterile water intermittent infusion  magnesium sulfate 4 g in 100 mL sterile water intermittent infusion  [COMPLETED] magnesium sulfate 4 g in 100 mL sterile water intermittent infusion  magnesium sulfate infusion  metoclopramide (REGLAN) injection 10 mg   Or  metoclopramide (REGLAN) tablet 10 mg  midazolam (VERSED) injection 2 mg  montelukast (SINGULAIR) tablet 10 mg  niCARdipine 40 mg in 200 mL NS (CARDENE) infusion  NIFEdipine (PROCARDIA) capsule 10-20 mg  NIFEdipine ER OSMOTIC (PROCARDIA XL) 24 hr tablet 30 mg  No Tdap Needed - Assessment: Patient does not need Tdap vaccine  ondansetron (ZOFRAN ODT) ODT tab 4 mg   Or  ondansetron (ZOFRAN) injection 4 mg  [COMPLETED] potassium chloride ER (KLOR-CON M) CR tablet 40 mEq  prochlorperazine (COMPAZINE) injection 10 mg   Or  prochlorperazine (COMPAZINE) tablet 10 mg   Or  prochlorperazine (COMPAZINE) suppository 25 mg  sertraline (ZOLOFT) tablet 100 mg  sodium chloride (PF) 0.9% PF flush 3 mL  sodium chloride (PF) 0.9% PF flush 3 mL    albuterol (PROAIR HFA/PROVENTIL HFA/VENTOLIN HFA) 108 (90 Base) MCG/ACT inhaler, Inhale 2 puffs into the lungs every 6 hours  fluticasone (FLOVENT HFA) 110 MCG/ACT inhaler, Inhale 1 puff into the lungs 2 times daily  montelukast (SINGULAIR) 10 MG tablet,  Take 1 tablet (10 mg) by mouth At Bedtime  ondansetron (ZOFRAN) 4 MG tablet, Take 1 tablet (4 mg) by mouth every 8 hours as needed for nausea (Patient not taking: Reported on 2023)  Mars w/o G-AH-Eyttkhv-FA-DHA (PNV-DHA PO),   sertraline (ZOLOFT) 100 MG tablet, Take 1 tablet (100 mg) by mouth daily  vitamin B6 (PYRIDOXINE) 100 MG tablet, Take 100 mg by mouth daily (Patient not taking: Reported on 2023)        Physical Exam       LABS: Reviewed.   Arterial Blood Gases   No lab results found in last 7 days.  Complete Blood Count   Recent Labs   Lab 23  0506 23  1705   WBC 20.1* 12.6*   HGB 11.3* 10.6*    159     Basic Metabolic Panel  Recent Labs   Lab 23  1326 23  1034 23  0950 23  0506 23  1705   NA  --  134*  --  133* 139   POTASSIUM  --  3.1*  3.1*  --  3.2* 3.2*   CHLORIDE  --  100  --  102 106   CO2  --  16*  --  16* 22   BUN  --  6.2  --  6.6 6.3   CR  --  0.75  --  0.75 0.83   GLC 98 146* 105* 172* 81     Liver Function Tests  Recent Labs   Lab 23  0506 23  1705   AST 40 37   ALT 31 27   ALKPHOS 141* 125*   BILITOTAL 0.3 0.2   ALBUMIN 3.1* 3.1*     Coagulation Profile  No lab results found in last 7 days.    IMAGING:  Recent Results (from the past 24 hour(s))   [facility] US Comprehensive Single    Narrative            Comprehensive  ---------------------------------------------------------------------------------------------------------  Pat. Name: MIGUE CHIRINOS       Study Date:  2023 2:35pm  Pat. NO:  [id number]        Referring  MD: JADON GREER  Site:  [facility]       Sonographer: Kenya Sales RDMS   :  1990        Age:   32  ---------------------------------------------------------------------------------------------------------    INDICATION  ---------------------------------------------------------------------------------------------------------  Head circumference 3% on outside  ultrasound.      METHOD  ---------------------------------------------------------------------------------------------------------  Transabdominal ultrasound examination. View: Suboptimal view: limited by late gestational age      PREGNANCY  ---------------------------------------------------------------------------------------------------------  Abernathy pregnancy. Number of fetuses: 1      DATING  ---------------------------------------------------------------------------------------------------------                                           Date                                Details                                                                                      Gest. age                      PIO  LMP                                  3/7/2023                                                                                                                           29 w + 3 d                     12/12/2023  Prior assessment               5/17/2023                         GA: 9 w + 3 d                                                                            28 w + 5 d                     12/17/2023  U/S                                   9/29/2023                         based upon AC, BPD, Femur, HC                                                28 w + 2 d                     12/20/2023  Assigned dating                  Dating performed on 09/29/2023, based on the LMP                                                            29 w + 3 d                     12/12/2023      GENERAL EVALUATION  ---------------------------------------------------------------------------------------------------------  Cardiac activity present.  bpm.  Fetal movements present.  Presentation cephalic.  Placenta Posterior, No Previa, > 2 cm from internal os.  Umbilical cord 3 vessel cord.  Amniotic fluid Amount of AF: normal. MVP 4.7 cm.      FETAL  BIOMETRY  ---------------------------------------------------------------------------------------------------------  Main Fetal Biometry:  BPD                                        70.2                    mm                         28w 1d                Hadlock  OFD                                        96.2                    mm                         28w 3d                Nicolaides  HC                                          267.7                  mm                          29w 1d                Hadlock  Cerebellum tr                            36.1                   mm                          31w 1d                Nicolaides  AC                                          241.7                  mm                          28w 3d        17%        Hadlock  Femur                                      51.7                   mm                          27w 4d                Hadlock  Humerus                                  47.9                    mm                         28w 1d                Frances  Fetal Weight Calculation:  EFW                                       1,193                  g                                     9%         Hadlock  EFW (lb,oz)                             2 lb 10                 oz  EFW by                                        Hadlock (BPD-HC-AC-FL)  Head / Face / Neck Biometry:                                             2.4                     mm  CM                                          6.8                     mm  Nasal bone                               8.6                     mm      FETAL ANATOMY  ---------------------------------------------------------------------------------------------------------  The following structures appear normal:  Head / Neck                         Cranium. Head size. Head shape. Lateral ventricles. Choroid plexus. Midline falx. Cavum septi pellucidi. Cerebellum. Cisterna magna.                                             Parenchyma.  Thalami. Vermis.                                             Neck. Nuchal fold.  Face                                   Lips. Profile. Nose. Maxilla. Mandible. Orbits. Lens.  Heart / Thorax                      4-chamber view. RVOT view. LVOT view. Situs. Aortic arch view. Bicaval view. Ductal arch view. Superior vena cava. Inferior vena cava. 3-vessel                                             view. 3-vessel-trachea view. Cardiac position. Cardiac size. Cardiac rhythm.                                             Right lung. Left lung. Diaphragm.  Abdomen                             Abdominal wall. Stomach. Kidneys. Bladder. Liver. Bowel. Genitals.  Spine                                  Cervical spine. Thoracic spine. Lumbar spine. Sacral spine.  Extremities / Skeleton          Right arm. Left arm. Right leg. Left leg.    The following structures could not be adequately visualized:  Abdomen                             Cord insertion.  Extremities / Skeleton          Right hand. Left hand. Right foot. Left foot.    Gender: male.      FETAL DOPPLER  ---------------------------------------------------------------------------------------------------------  Umbilical Artery:  PI                                            1.01                                                          58%        Kinsey  HR                                          155                     bpm      MATERNAL STRUCTURES  ---------------------------------------------------------------------------------------------------------  Cervix                                  Visualized                                             Appearance: Appears Closed                                             Approach - Transabdominal: Cervical length 34.0 mm  Right Ovary                          Not visualized  Left Ovary                            Not visualized      NON STRESS  TEST  ---------------------------------------------------------------------------------------------------------  NST interpretation: reactive. Test duration 41 min. Baseline  bpm. Baseline variability: moderate. Accelerations: present. Decelerations: absent. Uterine activity:  absent      RECOMMENDATION  ---------------------------------------------------------------------------------------------------------  We discussed the findings on today's ultrasound with the patient.    The findings on today's ultrasound are consistent with fetal growth restriction (FGR). We reviewed that we consider a pregnancy to be affected by FGR when the overall  EFW is <10th% or if the abdominal circumference (AC) is < 10th% as they have been found to be equally predictive of SGA. We discussed the potential etiologies of FGR  including constitutional, infectious etiologies (specifically CMV), fetal genetic and structural abnormalities, hypertensive disorders of pregnancy, as well as placental and  umbilical cord abnormalities. We reviewed the possible work up for FGR. I reviewed the limitations of ultrasound and we discussed the availability of amniocentesis for the  precise diagnosis of chromosomal abnormalities, which Selena has declined. She had low risk cfDNA screening in this pregnancy. We reviewed that regardless of the  etiology of FGR, we recommend additional monitoring due to the increased risk for adverse pregnancy outcomes with FGR and that the ultimate goal of management rests  on balancing the risks and benefits of delivery vs prematurity vs adverse outcome, including stillbirth. Selena was also noted to have severe range blood pressure at her  appointment today, and therefore recommend evaluation in L&D now to determine if treatment of hypertension is needed and to guide next steps based on her blood  pressure and laboratory evaluation. Patient to L&D now, Dr. John notified.    Thank-you for the opportunity to  participate in the care of this patient. If you have questions regarding today's evaluation or if we can be of further service, please contact the  [facility].    **Fetal anomalies may be present but not detected**    I spent a total of 45 minutes on the date of this encounter including preparing to see the patient (reviewing medical records/tests), in direct face-to-face contact with the  patient during her visit with the majority spent counseling and discussing the plan of care and documenting the visit in the electronic medical record. Please see note for  details.        Impression    IMPRESSION  ---------------------------------------------------------------------------------------------------------  1) Abernathy intrauterine pregnancy at 29w 3d gestational age.  2) None of the anomalies commonly detected by ultrasound were evident in the detailed fetal anatomic survey described above, although evaluation of fetal anatomy was  suboptimal as noted above.  3) Growth parameters and estimated fetal weight were consistent with fetal growth restriction.  4) The amniotic fluid volume appeared normal.  5) The NST is reactive.  6) The umbilical artery Doppler is within normal limits.

## 2023-09-30 NOTE — PROGRESS NOTES
ObGyn Progress note      Ongoing severe range HTN despite Hydralazine protocol, followed by 4 doses oral Nifedipine with persistent BPs 170's.  Discussed with MFM Dr Choi.    Will transfer to ICU for initiation of drip such as nicardipine.Ideally looking for improved blood pressure below 150/110.   If unable to control HTN in next couple hours, will need to proceed with  delivery for maternal safety.     Spoke with Intensivist regarding transfer.    Spoke with patient and her mom regarding the transfer and severity of ongoing HTN, including potential for  delivery.    While in the room, blood pressure 165 which is lowest it has been recently.     Will plan continuous fetal monitoring while in ICU for the time being.     (60 minutes was spent on the date of the encounter doing consultation with ICU and MFM,  review and interpretation of pertinent test results, medication adjustment, medical decision making, documentation, patient counseling.)    Selena Adamson Masters, DO  [phone]  Pager (over night) [phone]

## 2023-09-30 NOTE — PLAN OF CARE
Goal Outcome Evaluation:  Pt A/O x 4. No complaints of pain. Nicardipine stopped at 700 am. Generalized edema +2 in feet, legs, hands, feet and arms. Up with SBA to the BSC. Tolerating clear liquid diet. L and D nurse at the bedside.

## 2023-09-30 NOTE — PROGRESS NOTES
[facility] Intensive Care Consults  Consult follow-up  2023    Name: Selena Dutton      Age: 32 year old   YOB: 1990       Hsptl Day# 1  ICU DAY #    MV DAY #             Problem List:   Principal Problem:    Encounter for triage in pregnant patient  Active Problems:    Severe hypertension affecting pregnancy in third trimester         Summary/Hospital Course:     Pt is a 21-year-old female with asthma who is  29 weeks 3 days complicated by preeclampsia who was admitted (2023) for severe hypertension affecting her pregnancy in the third trimester.  She was found to have mild headache, extremity swelling and her hospital course has been notable for hypertension refractory to multiple antihypertensive who is being transferred to the ICU for continuous antihypertensive infusions.  Overnight her blood pressure has been well controlled on nicardipine drip and drip has been down-titrated.  She acknowledges mild dizzyness to me this morning (when pushed to distinguish between lightheadedness and vertigo, it sounds like actual vertigo) but denies confusion, vision changes, speech changes, numbness and weakness.      Assessment and plan :     Neurology/Psychiatry:   Vertigo:  She says this is a similar feeling to when she misses a dose of sertraline. She will get her sertraline this morning.  Otherwise normal neuro exam. I discussed with Dr. Stevenson's of OB who feels monitoring is an acceptable approach for now.    Analgesia/sedation:  Tylenol PRN    JAQUELIN/depression  PTA sertraline    Pulmonary:     No active issues.  Monitor for pulmonary edema on mag drip.  Lungs clear and on room air this morning.    Asthma  Not symptomatic currently  Low risk for bronchospasm in asthmatics with non-selective beta antagonists such as labetalol  PTA ICS daily  PTA montelukast  PTA albuterol    Cardiovascular:     Severe hypertension in third trimester pregnancy  Preeclampsia  No  evidence of HELLP syndrome at this time  Pre-eclampsia based on proteinuria  BP refractory to hydralazine, improving with nicardipine drip  Titrate nicardipine, SBP goal less than 150, DBP less than 110  Mag infusion  Okay for labetalol infusion if needed  Recurrent blood pressure monitoring and telemetry  Discussed with Dr. John of OB.  Initiating procardia XL 30 mg bid.    GI and Nutrition:  No active issues  Continue clears for now--?need for urgent ?.  Can advance to regular diet once cleared by primary team (OB).    Renal/Fluids/Electrolytes:   Creat ok 0.75  HypoK 3.2--replete  Low bicarb--16; ?spurious?, anion gap normal: 15; normal renal function; no clear etiology for acidosis but will check vbg and ketones.     Proteinuria  Secondary to pre-eclampsia  Creatinine stable  Generally avoid nephrotoxic agents such as NSAID, IV contrast unless specifically required  Adjust medications as needed for renal clearance  Follow I/O's as appropriate.    Infectious Disease:     No active issues.    Hematology/Oncology:   Anemia--hgb 11.3--physiologically appropriate to pregnancy  Leukocytosis 20:  likely reactive and in setting of getting IM steroids    Endocrine:   No active issues    Rheum/MSK/Other:   G1 29w3d   fetal IUGR 9th%   May need urgent c section per OB if BP remains uncontrolled  Fetal monitoring per OB  Beclomethasone for lung maturity    IV/Access:   Venous access  PIVs    Arterial access   none    ICU Checklist:   DVT prophylaxis: none  Stress Ulcer prophylaxis: Not indicated  Restraints: None  Wound care: per unit routine    Feeding: CLD  BG checks, ICU insulin and hypoglycemia protocol, goal sugar <180  Family Update: updated  Disposition: ICU    Clinically Significant Risk Factors Present on Admission        # Hypokalemia: Lowest K = 3.2 mmol/L in last 2 days, will replace as needed       # Hypoalbuminemia: Lowest albumin = 3.1 g/dL at 2023  5:06 AM, will monitor as appropriate     #  Hypertension: Noted on problem list          # Asthma: noted on problem list               Medical History:     Past Medical History:   Diagnosis Date    Anxiety and depression     Moderate persistent asthma without complication      Past Surgical History:   Procedure Laterality Date    HC TOOTH EXTRACTION W/FORCEP      MOUTH SURGERY       Social History     Socioeconomic History    Marital status:      Spouse name: Jhon    Number of children: Not on file    Years of education: Not on file    Highest education level: Not on file   Occupational History    Occupation: Human Resources   Tobacco Use    Smoking status: Never    Smokeless tobacco: Never   Vaping Use    Vaping Use: Never used   Substance and Sexual Activity    Alcohol use: Not Currently     Comment: 3 drinks/week    Drug use: Never    Sexual activity: Yes     Partners: Male     Birth control/protection: None   Other Topics Concern    Not on file   Social History Narrative    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     Lives in [place] with her  and dog.     Bought a house in [place] [facility] this Spring, closes in 2 weeks.      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         Free time: puppy havenese named Michelle.         Sary Meyer, DNP, APRN, CNP    06/01/22     Social Determinants of Health     Financial Resource Strain: Not on file   Food Insecurity: Not on file   Transportation Needs: Not on file   Physical Activity: Not on file   Stress: Not on file   Social Connections: Not on file   Interpersonal Safety: Not on file   Housing Stability: Not on file        Allergies   Allergen Reactions    Nuts Anaphylaxis    Cat Hair Extract     Peanut (Diagnostic)               Key Medications:      betamethasone acet & sod phos  12 mg Intramuscular Q24H    fluticasone  1 puff Inhalation BID    montelukast  10 mg Oral At Bedtime    sertraline  100 mg Oral Daily    sodium chloride (PF)  3 mL Intracatheter Q8H      lactated ringers 50 mL/hr  (09/29/23 1729)    magnesium sulfate 2 g/hr (09/30/23 0614)    niCARdipine Stopped (09/30/23 0703)    - MEDICATION INSTRUCTIONS -          Home Meds  No current facility-administered medications on file prior to encounter.  albuterol (PROAIR HFA/PROVENTIL HFA/VENTOLIN HFA) 108 (90 Base) MCG/ACT inhaler, Inhale 2 puffs into the lungs every 6 hours  fluticasone (FLOVENT HFA) 110 MCG/ACT inhaler, Inhale 1 puff into the lungs 2 times daily  montelukast (SINGULAIR) 10 MG tablet, Take 1 tablet (10 mg) by mouth At Bedtime  Prenat w/o D-SS-Qpiftxb-FA-DHA (PNV-DHA PO),   sertraline (ZOLOFT) 100 MG tablet, Take 1 tablet (100 mg) by mouth daily  ondansetron (ZOFRAN) 4 MG tablet, Take 1 tablet (4 mg) by mouth every 8 hours as needed for nausea (Patient not taking: Reported on 7/26/2023)  vitamin B6 (PYRIDOXINE) 100 MG tablet, Take 100 mg by mouth daily (Patient not taking: Reported on 7/26/2023)                 Physical Examination:   Temp:  [97.9  F (36.6  C)-98.4  F (36.9  C)] 98.4  F (36.9  C)  Pulse:  [] 93  Resp:  [6-33] 20  BP: (108-201)/() 130/72  SpO2:  [93 %-100 %] 98 %    No intake or output data in the 24 hours ending 09/29/23 1952      Wt Readings from Last 4 Encounters:   09/30/23 74.3 kg (163 lb 12.8 oz)   09/19/23 69.8 kg (153 lb 12.8 oz)   08/22/23 66.3 kg (146 lb 3.2 oz)   07/26/23 64.4 kg (142 lb)     BP - Mean:  [] 90  Resp: 20      No lab results found in last 7 days.    GEN:  NAD  HEENT: head ncat, sclera anicteric  PULM: non-labored respirations on RA, no wheezes or crackles  CV/COR: RRR, faint flow murmur  ABD: protuberant, fetal monitoring with fetal HR in 140s  EXT: trace LE edema  NEURO: alert, oriented, normal speech, moving all extremities equally and independently  SKIN: no obvious rash  LINES: clean, dry intact         Data:   All data and imaging reviewed     ROUTINE ICU LABS (Last four results)  CMP  Recent Labs   Lab 09/30/23  0506 09/29/23  1705   * 139   POTASSIUM 3.2*  3.2*   CHLORIDE 102 106   CO2 16* 22   ANIONGAP 15 11   * 81   BUN 6.6 6.3   CR 0.75 0.83   GFRESTIMATED >90 >90   BERNARDA 7.5* 8.7   PROTTOTAL 6.2* 5.8*   ALBUMIN 3.1* 3.1*   BILITOTAL 0.3 0.2   ALKPHOS 141* 125*   AST 40 37   ALT 31 27     CBC  Recent Labs   Lab 09/30/23  0506 09/29/23  1705   WBC 20.1* 12.6*   RBC 3.78* 3.46*   HGB 11.3* 10.6*   HCT 33.0* 30.4*   MCV 87 88   MCH 29.9 30.6   MCHC 34.2 34.9   RDW 13.9 13.9    159     INR  No lab results found in last 7 days.  Arterial Blood GasNo lab results found in last 7 days.    All cultures:  No results for input(s): CULT in the last 168 hours.  No results found for this or any previous visit (from the past 24 hour(s)).

## 2023-09-30 NOTE — PROGRESS NOTES
OB/GYN PROGRESS NOTE    S: No complaints, no HA/vision changes.  +FM. No VB    Off the nicardipine drip this am    O:  Vitals:    09/30/23 0945 09/30/23 1015 09/30/23 1030 09/30/23 1100   BP: 138/87 136/87 (!) 142/89 (!) 139/91   Pulse: 105 101 96 95   Resp: 25 18 22 23   Temp:       TempSrc:       SpO2: 99% 99% 99% 99%   Weight:           Intake/Output Summary (Last 24 hours) at 9/30/2023 1107  Last data filed at 9/30/2023 1000  Gross per 24 hour   Intake 1846.03 ml   Output 1350 ml   Net 496.03 ml     About 175ml/hr    Gen: AOx3, NAD  Resp: non labored breathing  Abd: gravid, soft, NT, ND.  Ext: 1+ b/l LE edema.     Labs:  Reviewed  Cr 0.75 < 0.75 <0.83  Plt 193 < 159 <177  ALT/AST 31/40 <27/37  K 3.2  WBC 20.1 < 12.6 (suspect reactive)    FHT: 120 approp for GA, 10x10 accels, no decels, min - mod variability  [place]: none    A/P: 33yo G1 @ 29+4wk with preE w/ SF (BPs), stabilized.  -PreE SF (BPs) On mag sulfate. Failed hydralazine protocol followed by oral nifedipine protocol last evening, moved to ICU for nicardipine drip which she responded to very well over night. Off drip around 8am, started oral Procardia XL 30mg planned BID. Labs stable, asymptomatic.  Adequate UOP    -Fetus with IUGR 9%. Reassuring fetal status. On continuous monitoring.   Second BMZ around 5pm.     -Asthma. Flovent/albuterol. Has reported poor control this pregnancy. Not symptomatic this admission. Not required additional therapy    -Anxiety/depression. Sertraline. Mood appropriate.    -SCDs.    -Dispo: Spoke with Dr. Choi. Due to early gestational age and severe maternal illness, will plan to transfer to [place].  Discussed transfer with patient who is in agreement.       (30 minutes was spent on the date of the encounter doing chart review, coordination of transfer, review and interpretation of pertinent test results, history and/or exam, documentation, patient counseling.)     Selena Adamson Masters, DO

## 2023-09-30 NOTE — PROVIDER NOTIFICATION
09/30/23 1130   Provider Notification   Provider Name/Title Masters   Method of Notification At Bedside   Request Evaluate in Person   Notification Reason Status Update;Other (Comment)  (Transfer to [facility])

## 2023-09-30 NOTE — PROGRESS NOTES
Assessment: Afebrile, Aox4, BP within acceptable limit,room air saturating 99 %, SR, RASS 0, Able to ambulate to the bathroom with SBA, on Magnesium and LRS infusion, PIV x2,  Denies chest pain, dizziness, head ache, vaginal bleeding and abdominal contraction,   Major Shift Events:   Admission from [facility]  Plan:  Hemodynamic monitoring         : collaboration of care from OB-GYN nurses/ Department          : on fetal monitoring device being checked and managed by OB-GYN            For vital signs and complete assessments, please see documentation flowsheets.

## 2023-09-30 NOTE — PROVIDER NOTIFICATION
09/30/23 1015   Provider Notification   Provider Name/Title Masters   Method of Notification At Bedside   Request Evaluate in Person   Notification Reason Variability Change;Maternal Vital Sign Change;Lab/Diagnostic Study;Status Update

## 2023-09-30 NOTE — H&P
[facility] Intensive Care Unit  History and Physical  2023    Name: Selena Dutton      Age: 32 year old   YOB: 1990       Hsptl Day# 0  ICU DAY #    MV DAY #             Problem List:   Principal Problem:    Encounter for triage in pregnant patient  Active Problems:    Severe hypertension affecting pregnancy in third trimester         Summary/Hospital Course:     Pt is a 21-year-old female with asthma who is  29 weeks 3 days complicated by preeclampsia who was admitted (2023) for severe hypertension affecting her pregnancy in the third trimester.  She was found to have mild headache, extremity swelling and her hospital course has been notable for hypertension refractory to multiple antihypertensive who is being transferred to the ICU for continuous antihypertensive infusions.      Assessment and plan :     Neurology/Psychiatry:   No active issues  Does not have headache, had headache earlier this week    Analgesia/sedation:  Tylenol PRN    JAQUELIN/depression  PTA sertraline    Pulmonary:     No active issues.    Asthma  Not symptomatic currently  Low risk for bronchospasm in asthmatics with non-selective beta antagonists such as labetalol  PTA ICS daily  PTA montelukast  PTA albuterol    Cardiovascular:     Severe hypertension in third trimester pregnancy  Preeclampsia  No evidence of HELLP syndrome at this time  Pre-eclampsia based on proteinuria  BP refractory to hydralazine and nicardipine  Titrate nicardipine, SBP goal less than 150, DBP less than 110  Mag infusion  Okay for labetalol infusion if needed  Recurrent blood pressure monitoring and telemetry    GI and Nutrition:  No active issues    Renal/Fluids/Electrolytes:   No active issues.    Proteinuria  Secondary to pre-eclampsia    Creatinine stable  Generally avoid nephrotoxic agents such as NSAID, IV contrast unless specifically required  Adjust medications as needed for renal clearance  Follow  I/O's as appropriate.    Infectious Disease:     No active issues.    Hematology/Oncology:   No active issues.    Endocrine:   No active issues    Rheum/MSK/Other:   G1 29w3d   fetal IUGR 9th%   May need urgent c section per OB if BP remains uncontrolled  Fetal monitoring per OB  Beclomethasone for lung maturity    IV/Access:   Venous access  PIVs    Arterial access   none    ICU Checklist:   DVT prophylaxis: none  Stress Ulcer prophylaxis: Not indicated  Restraints: None  Wound care: per unit routine    Feeding: CLD  BG checks, ICU insulin and hypoglycemia protocol, goal sugar <180  Family Update: updated  Disposition: ICU    Patient staffed with Dr. Mya Martin MD  Pulmonary and Critical Care Fellow  [facility]  Pager: [phone]    Key goals for next 24 hours:   1. Stabilize blood pressure         Medical History:     Past Medical History:   Diagnosis Date    Anxiety and depression     Moderate persistent asthma without complication      Past Surgical History:   Procedure Laterality Date    HC TOOTH EXTRACTION W/FORCEP      MOUTH SURGERY       Social History     Socioeconomic History    Marital status:      Spouse name: Jhon    Number of children: Not on file    Years of education: Not on file    Highest education level: Not on file   Occupational History    Occupation: Human Resources   Tobacco Use    Smoking status: Never    Smokeless tobacco: Never   Vaping Use    Vaping Use: Never used   Substance and Sexual Activity    Alcohol use: Not Currently     Comment: 3 drinks/week    Drug use: Never    Sexual activity: Yes     Partners: Male     Birth control/protection: None   Other Topics Concern    Not on file   Social History Narrative    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     Lives in [place] with her  and dog.     Bought a house in [facility] [place] [facility] this Spring, closes in 2 weeks.      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         Free time: puppy havenese  named Maico Meyer, DNP, APRN, CNP    06/01/22     Social Determinants of Health     Financial Resource Strain: Not on file   Food Insecurity: Not on file   Transportation Needs: Not on file   Physical Activity: Not on file   Stress: Not on file   Social Connections: Not on file   Interpersonal Safety: Not on file   Housing Stability: Not on file        Allergies   Allergen Reactions    Nuts Anaphylaxis    Cat Hair Extract     Peanut (Diagnostic)               Key Medications:      betamethasone acet & sod phos  12 mg Intramuscular Q24H    sodium chloride (PF)  3 mL Intracatheter Q8H      lactated ringers 50 mL/hr (09/29/23 1729)    magnesium sulfate 2 g/hr (09/29/23 1809)    niCARdipine      - MEDICATION INSTRUCTIONS -          Home Meds  No current facility-administered medications on file prior to encounter.  albuterol (PROAIR HFA/PROVENTIL HFA/VENTOLIN HFA) 108 (90 Base) MCG/ACT inhaler, Inhale 2 puffs into the lungs every 6 hours  fluticasone (FLOVENT HFA) 110 MCG/ACT inhaler, Inhale 1 puff into the lungs 2 times daily  montelukast (SINGULAIR) 10 MG tablet, Take 1 tablet (10 mg) by mouth At Bedtime  Prenat w/o M-FO-Ogfxrpm-FA-DHA (PNV-DHA PO),   sertraline (ZOLOFT) 100 MG tablet, Take 1 tablet (100 mg) by mouth daily  ondansetron (ZOFRAN) 4 MG tablet, Take 1 tablet (4 mg) by mouth every 8 hours as needed for nausea (Patient not taking: Reported on 7/26/2023)  vitamin B6 (PYRIDOXINE) 100 MG tablet, Take 100 mg by mouth daily (Patient not taking: Reported on 7/26/2023)                 Physical Examination:   Temp:  [97.9  F (36.6  C)] 97.9  F (36.6  C)  Pulse:  [65-70] 70  Resp:  [16] 16  BP: (165-201)/() 165/91  SpO2:  [98 %-99 %] 98 %    No intake or output data in the 24 hours ending 09/29/23 1952      Wt Readings from Last 4 Encounters:   09/19/23 69.8 kg (153 lb 12.8 oz)   08/22/23 66.3 kg (146 lb 3.2 oz)   07/26/23 64.4 kg (142 lb)   06/21/23 64.4 kg (142 lb)        Resp: 16      No  lab results found in last 7 days.    GEN:  young female, NAD  HEENT: head ncat, sclera anicteric  PULM: non-labored respirations on RA, no wheezes or crackles  CV/COR: RRR, faint flow murmur  ABD: protuberant, fetal monitoring with fetal HR in 140s  EXT: trace LE edema  NEURO: alert, oriented, normal speech, moving all extremities equally and independently  SKIN: no obvious rash  LINES: clean, dry intact         Data:   All data and imaging reviewed     ROUTINE ICU LABS (Last four results)  CMP  Recent Labs   Lab 09/29/23  1705      POTASSIUM 3.2*   CHLORIDE 106   CO2 22   ANIONGAP 11   GLC 81   BUN 6.3   CR 0.83   GFRESTIMATED >90   BERNARDA 8.7   PROTTOTAL 5.8*   ALBUMIN 3.1*   BILITOTAL 0.2   ALKPHOS 125*   AST 37   ALT 27     CBC  Recent Labs   Lab 09/29/23  1705   WBC 12.6*   RBC 3.46*   HGB 10.6*   HCT 30.4*   MCV 88   MCH 30.6   MCHC 34.9   RDW 13.9        INR  No lab results found in last 7 days.  Arterial Blood GasNo lab results found in last 7 days.    All cultures:  No results for input(s): CULT in the last 168 hours.  No results found for this or any previous visit (from the past 24 hour(s)).

## 2023-09-30 NOTE — PROGRESS NOTES
Assumed care at 0715  ICU MD in room for morning assessment  Pt alert and in good spirits  C/o dizziness  Baby appropriate tracing for gest age

## 2023-09-30 NOTE — PLAN OF CARE
Patient was transferred from LD at 2100, nicardipine started at 2125, titrate per parameter for SBP<150. Patient is Alert an oriented x 4, Neuro intact, SR, generalized edema +2, up to bedside commode , voiding adequately. LD RN in room monitoring closely.

## 2023-09-30 NOTE — PROGRESS NOTES
Admitted/transferred from: [facility] at 1540 hrs  Reason for admission/transfer:  hemodynamic monitoring  due to high ketones on blood work up, initially on nicardipine infusion due to SBP of 200s , on magnesium infusion  2 RN skin assessment: completed by : Nitesh / Sarath RNs  Result of skin assessment and interventions/actions: : intact   Height, weight, drug calc weight: Done  Patient belongings (see Flowsheet)  MDRO education added to care planN/A  ?

## 2023-09-30 NOTE — DISCHARGE SUMMARY
Physician Discharge Summary     Patient ID:  Selena Dutton  [id number]  32 year old  1990    Admit date: 9/29/2023    Discharge date and time: 9/30/2023     Admitting Physician: Selena Adamson Masters, DO     Discharge Physician: Ramirez Watson    Admission Diagnoses: Encounter for triage in pregnant patient [Z36.89]  Severe hypertension affecting pregnancy in third trimester [O16.3]      Discharge Diagnoses:   Same as admission diagnoses, also:  Pre-eclampsia  hypoK  Ketoacidosis, DM vs starvation  hypoNa  Anemia appropriate to pregnancy  Leukocytosis in setting of steroids    Admission Condition: stable    Discharged Condition: stable    Indication for Admission: see above    Hospital Course:   Ms. Dutton is a 33 yo F 29 weeks pregnant who was admitted for pre-eclampsia and hypertension.  Transferred to the ICU for nicardipine drip which was weaned off overnight and she was started on procardia XL per OB recommendation.  She was also started on IM betamethasone for fetal lung maturation which caused her to have hyperglycemia and she developed keto acidosis with this.  At the time of transfer it is not clear whether this is starvation ketoacidosis from minimal PO intake overnight (had kept relatively npo for concern for possible c section) or gestational diabetic ketoacidosis (seems unlikely with fsg of only 142, but could be occult).  Transfer to  was already planned as pt is too premature for our NICU should that become a need.  I discussed this suspected complication of pregnancy with the OB doctor at  Dr Choi who did not feel comfortable managing it and recommended hospitalist admission.  Discussed with Dr. Jack of the ICU service who graciously accepted the patient.    Consults: OB    Disposition:     Patient Instructions:   Current Discharge Medication List        CONTINUE these medications which have NOT CHANGED    Details   albuterol (PROAIR HFA/PROVENTIL HFA/VENTOLIN HFA) 108 (90 Base) MCG/ACT  inhaler Inhale 2 puffs into the lungs every 6 hours  Qty: 18 g, Refills: 3    Comments: Pharmacy may dispense brand covered by insurance (Proair, or proventil or ventolin or generic albuterol inhaler)      fluticasone (FLOVENT HFA) 110 MCG/ACT inhaler Inhale 1 puff into the lungs 2 times daily  Qty: 12 g, Refills: 0    Comments: Pharmacy may dispense brand if preferred by insurance.      montelukast (SINGULAIR) 10 MG tablet Take 1 tablet (10 mg) by mouth At Bedtime  Qty: 90 tablet, Refills: 3    Associated Diagnoses: Moderate persistent asthma without complication      Prenat w/o M-KW-Cvjqzns-FA-DHA (PNV-DHA PO)       sertraline (ZOLOFT) 100 MG tablet Take 1 tablet (100 mg) by mouth daily  Qty: 90 tablet, Refills: 3    Associated Diagnoses: Anxiety and depression      ondansetron (ZOFRAN) 4 MG tablet Take 1 tablet (4 mg) by mouth every 8 hours as needed for nausea  Qty: 30 tablet, Refills: 0    Associated Diagnoses: Nausea and vomiting during pregnancy      vitamin B6 (PYRIDOXINE) 100 MG tablet Take 100 mg by mouth daily           Follow up to be determined by receiving hospital    Signed:  Ramirez Watson MD  9/30/2023  12:41 PM

## 2023-09-30 NOTE — PROGRESS NOTES
TOCO and EFM applied,  , patient denies contractions, vaginal bleeding or loss of fluid.  Will continue to monitor.

## 2023-09-30 NOTE — CONSULTS
[facility]  Maternal Fetal Medicine Consult Note  Requesting Team: ICU, [facility]  Patient: Selena Dutton  : 1990  MRN: [id number]    2023    HPI:  Selena Dutton is a 32 year old  at 29w4d by LMP c/w 9w3d US admitted to the ICU as a transfer of care from [facility] in the setting of new diagnosis of severe pre-eclampsia by blood pressure criteria as well as acidosis suspected due to starvation ketosis.    We have been asked to consult to provide co-management for pregnancy and co-occurring complication of  preeclampsia with severe features, as diagnosed by new onset severe hypertension and proteinuria >/= 0.3 on urine protein/creatinine ratio.    On , patient presented to [facility] clinic for comprehensive ultrasound for a follow up ultrasound in the setting of a fetal US head circumference of 3% at an outside hospital. Patient underwent US and was newly diagnosed with fetal growth restriction with an EFW of 9%tile (1193g). At the ultrasound appointment, patient was noted to have severe range blood pressures and was thus sent to [facility] for evaluation. Patient has not had elevated blood pressures prior to this during or prior to pregnancy. Did report several week history of faster weight gain and intermittent headaches for a couple of days. Upon arrival to [facility], patient had sustained severe range blood pressures with systolic blood pressures in the 200s meeting criteria for pre-eclampsia with severe features. Pre-eclampsia laboratory evaluation was notable for urine protein. She noted a mild headache for the prior two weeks and some increase in swelling in her hands and feet but otherwise was asymptomatic. She was started on Magnesium and given a dose of Betamethasone. Given difficult to control blood pressures, the patient was ultimately transferred to the ICU at [facility] and started on Nicardipine drip - she had maxed out treatment in pregnancy  "for urgent hypertension using hydralazine and nifedipine and labetalol was not used due to concern for diagnosis of asthma. Overnight, her blood pressures did stabilize and she was able to be taken off the Nicardipine drip at ~0700 on . Throughout her stay at [facility], patient did not eat (made NPO in the case of emergent delivery) and had episode of nausea w/ one emesis. She had a CMP that was within normal limits on arrival to the hospital but repeat laboratory evaluation revealed an anion gap up to 18 and beta hydroxybutyrate of 0.70 w/ VBG ph of 7.39 w/ pCO2 of 32 thought likely starvation ketosis given no history of diabetes w/ normal GCT and BG <110 since arrival to the hospital - however, she did have new onset elevated blood glucose after her betamethasone and there was some concern for possible occult DKA although the patient had no history of diabetes prior to or during her pregnancy. Patient was transferred to the [facility] for NICU cares in the event of an indicated delivery given gestational age < 30 weeks. She was admitted to the ICU for management of acidosis with MFM consulting and planning to assume care when safe to discharge to labor and delivery.     Upon arrival here patient feels \"fuzzy\" and \"groggy\". Denies headache or vision changes at this time. Denies any chest pain, shortness of breath, RUQ pain. Is feeling baby move as usual. No vaginal bleeding, loss of fluid, or contractions.     Obstetrics History:  OB History    Para Term  AB Living   1 0 0 0 0 0   SAB IAB Ectopic Multiple Live Births   0 0 0 0 0      # Outcome Date GA Lbr Derick/2nd Weight Sex Delivery Anes PTL Lv   1 Current              Past Medical History:  Past Medical History:   Diagnosis Date    Anxiety and depression     Moderate persistent asthma without complication      Past Surgical History:  Past Surgical History:   Procedure Laterality Date    HC TOOTH EXTRACTION W/FORCEP      MOUTH SURGERY   "       Current Medications:  Prior to Admission medications    Medication Sig Last Dose Taking? Auth Provider Long Term End Date   albuterol (PROAIR HFA/PROVENTIL HFA/VENTOLIN HFA) 108 (90 Base) MCG/ACT inhaler Inhale 2 puffs into the lungs every 6 hours   Hazel Ghotra MD Yes    fluticasone (FLOVENT HFA) 110 MCG/ACT inhaler Inhale 1 puff into the lungs 2 times daily   Hazel Ghotra MD Yes    montelukast (SINGULAIR) 10 MG tablet Take 1 tablet (10 mg) by mouth At Bedtime   Sary Meyer APRN CNP Yes    ondansetron (ZOFRAN) 4 MG tablet Take 1 tablet (4 mg) by mouth every 8 hours as needed for nausea  Patient not taking: Reported on 7/26/2023   Hazel Ghotra MD     Prenat w/o H-WL-Uznskea-FA-DHA (PNV-DHA PO)    Reported, Patient     sertraline (ZOLOFT) 100 MG tablet Take 1 tablet (100 mg) by mouth daily   Sary Meyer APRN CNP Yes    vitamin B6 (PYRIDOXINE) 100 MG tablet Take 100 mg by mouth daily  Patient not taking: Reported on 7/26/2023   Reported, Patient         Allergies:  Nuts, Cat hair extract, and Peanut (diagnostic)    Social History:   Denies use of alcohol, drugs or smoking.    ROS:  10-point ROS negative except as in HPI     PHYSICAL EXAM:  General: no acute distress   Cardiac: RRR, no murmurs   Pulm: CTAB, breathing comfortably   Ext: 1+ edema bilaterally to knees, 2+ patellar reflexes bilaterally, no clonus     Latest Reference Range & Units 09/30/23 05:06 09/30/23 10:34 09/30/23 16:05   Sodium 135 - 145 mmol/L 133 (L) 134 (L) 134 (L)   Potassium 3.4 - 5.3 mmol/L 3.2 (L) 3.1 (L)  3.1 (L) 3.2 (L)   Chloride 98 - 107 mmol/L 102 100 101   Carbon Dioxide (CO2) 22 - 29 mmol/L 16 (L) 16 (L) 19 (L)   Urea Nitrogen 6.0 - 20.0 mg/dL 6.6 6.2 5.6 (L)   Creatinine 0.51 - 0.95 mg/dL  0.51 - 0.95 mg/dL 0.75 0.75 0.68  0.69   GFR Estimate >60 mL/min/1.73m2  >60 mL/min/1.73m2 >90 >90 >90  >90   Calcium 8.6 - 10.0 mg/dL 7.5 (L) 6.8 (L) 6.4 (L)   Anion Gap 7 - 15 mmol/L 15 18 (H) 14      Latest Reference Range &  Units 23 09:26 23 17:05 23 05:06   WBC 4.0 - 11.0 10e3/uL 10.3 12.6 (H) 20.1 (H)   Hemoglobin 11.7 - 15.7 g/dL 10.7 (L) 10.6 (L) 11.3 (L)   Hematocrit 35.0 - 47.0 % 31.4 (L) 30.4 (L) 33.0 (L)   Platelet Count 150 - 450 10e3/uL 177 159 193   RBC Count 3.80 - 5.20 10e6/uL 3.54 (L) 3.46 (L) 3.78 (L)   MCV 78 - 100 fL 89 88 87   MCH 26.5 - 33.0 pg 30.2 30.6 29.9   MCHC 31.5 - 36.5 g/dL 34.1 34.9 34.2   RDW 10.0 - 15.0 % 14.1 13.9 13.9      Latest Reference Range & Units 23 10:35   FIO2  0   Ph Venous 7.32 - 7.43  7.39   PCO2 Venous 40 - 50 mm Hg 32 (L)   PO2 Venous 25 - 47 mm Hg 48 (H)   Bicarbonate Venous 21 - 28 mmol/L 20 (L)   Base Excess Venous -7.7 - 1.9 mmol/L -4.3     Other Imaging:   [facility] US comprehensive    IMPRESSION  1) Abernathy intrauterine pregnancy at 29w 3d gestational age.  2) None of the anomalies commonly detected by ultrasound were evident in the detailed fetal anatomic survey described above, although evaluation of fetal anatomy was  suboptimal as noted above.  3) Growth parameters and estimated fetal weight were consistent with fetal growth restriction.  4) The amniotic fluid volume appeared normal.  5) The NST is reactive.  6) The umbilical artery Doppler is within normal limits.    ASSESSMENT/PLAN:  Selena Dutton is a 32 year old  at 29w4d by LMP c/w 9w3d US admitted to the ICU as a transfer of care from [facility] in the setting of new diagnosis of pre-eclampsia  with severe features by blood pressure criteria and proteinuria also complicated by acidosis suspected due to starvation ketosis.    Pre-eclampsia w/ SF (BP and proteinuria)  - Serial BP monitoring to keep BP < 160/110  - BPs: intermittently severe and mild range on arrival.   - Antihypertensives: Previously required Nicardipine drip at [facility], discontinued this morning around ~0700. On arrival, Nifedipine 30 BID ordered. Discussed up titration of evening dose of nifedipine. Will plan on  60 this evening for a total of 90 of  Nifedipine XL today. Will increase AM dose to 60 (currently ordered for 30) PRN. If need to increase long acting blood pressure medications, recommend maxing out on Nifedipine first (120 daily total) with addition of Labetalol PRN (can increase  to total daily dose of 2400 mg - typically start with 100-200 mg BID-TID).    - Labs: HELLP labs wnl, UPC 0.67; repeat repeat q12 hrs   - Daily weights, strict I&Os  - On Magnesium. S/p loading dose at OSH. Continue w/ 2 g/hr until blood pressures stabilized.   - Will treat w/ short acting antihypertensives for sustained severe range blood pressures (>160 systolic or >110 diastolic) with Labetalol algorithm which has been ordered and can be followed by hydralazine algorithm in pregnancy - please contact OB/MFM if needing to escalte to this. Consider antihypertension drip to restart if these are ineffective.     #Anion gap metabolic acidosis, suspect starvation ketosis   - Defer to ICU team for cares and repeat laboratory evaluation   - If acidosis/ketosis corrected, okay to take patient on the antepartum unit   -If related to occult DKA (unlikely given negative screening for gestational diabetes and normal HgA1c) would need treatment per DKA algorithm to include glucose and insulin given all glucose values < 200.    #FGR  - Newly diagnosed on US 9/29 w/ EFW 9%tile, UAD wnl.   - Will plan weekly surveillance of fetus with biophysical profile with umbilical artery Doppler flow (BPP w/ UAD) and q3week growth US while pregnant     # PNC  - Rh +, Rubella immune, , GBS- need to obtain   - Other prenatal labs wnl  - Imaging: placenta posterior      # FWB:   - Continuous Fetal Monitoring while on magnesium - change to TID if stopping magnesium - OB nursing team to assist  - BMZ for fetal lung maturity; s/p 2 doses   - Magnesium for neuroprotection - if delivery anticipated in next 12 hours (will already be on for delivery due to  preeclampsia)  - NICU for delivery, s/p consult   - S/p anesthesia consult   - Intrauterine resuscitative measures prn    #Anxiety/Depression  - Continue PTA Zoloft     #Asthma  - Worse in pregnancy   - PTA Singulair, Flovent, Albuterol   - Avoid hemabate in the event of delivery w/ postpartum hemorrhage     #Delivery planning:   - Patient cephalic on US yesterday so candidate for vaginal delivery pending stability.   - Discussed the fact that pre-eclampsia is not necessary an indication for  section and that induction of labor is a possibility if delivery warranted. Reiterated however, that there are indications for  delivery if delivery needs to be expedited for example, severely elevated blood pressures that are unable to be managed (please see ACOG guidelines below).   - Patient verbally consented to a  section overnight as indicated but would like to formally go over  section risks and sign consent when  is present in the morning. Did verbally consent to a blood transfusion as needed as well.   - Will deliver by 34 weeks at the latest given diagnosis, however high likelihood given presentation, that patient may have to be delivered in the next 2 day with a goal to stabilize patient enough so that she can remain pregnant through her betamethasone window (48 hours from first dose).   - Discussed with anesthesia. CLD overnight to access stability.   - Will hold Lovenox. We typically recommend Lovenox after 72 hours of admission. Will hold given the likelihood of delivery over the next 2 days.      As per ACOG, please use the following for conditions which preclude expectant management (delivery recommended).     Maternal  Uncontrolled severe-range BP (systolic 160 or diastolic 110 or greater) not responsive to antiHTN medication  Persistent headaches, refractory to treatment  Epigastric pain or RUQ pain unresponsive to repeat analgesics  Visual disturbances, motor deficit or  altered sensorium  Stroke   Myocardial infarction  HELLP syndrome  New or worsening renal dysfunction (serum creatinine > 1.1. mg/dl or twice baseline  Pulmonary edema  Eclampsia  Suspected placental abruption or vaginal bleeding in the absence of placenta previa    Fetal   Abnormal fetal testing  Fetal death  Fetus without expectation for survival at the time of maternal diagnosis (eg lethal anomaly, extreme prematurity)  Persistent reversed end-diastolic flow in the umbilical artery.     Patient was seen and careplan managed under the supervision of Dr. Choi. Care plan  also discussed with ICU staff.     OB/MFM in house senior resident is available  (page OB resident [phone] or if not answering contact labor and delivery [phone] ask for charge nurse). MFM faculty on call until 10/1 at 7 am Dr. David Magallon MD   OBGYN resident PGY3  2023 8:55 PM     Physician Attestation   I, David Choi MD, was immediately available at the time of this assessment and coordinated with the transfer team for plan      Key findings: This patient has  preeclampsia with severe features - current severe features include severe HTN and also has proteinuria. Attempts should be made to prolong pregnancy for maximum benefit of betamethasone course for improved  outcomes with anticipated  birth. Although the delivery goal is 34 weeks for preE with severe features, she has several identified concerns which may warrant delivery 48 hours after initial corticosteroid for fetal benefit were given -  Severe HTN that is difficult to control  Maternal persistent headache    We appreciate ICU primary care to address her acidosis including elevated ketones and anion gap identified at [facility] earlier today. If this improves overnight, we would recommend transfer to the labor unit in the morning, for potential delivery tomorrow if her HTN control does not stabilize and/or her headache is  persistent.     I did not see this patient at the time of this initial evaluation. She will be seen by the entire MFM team in the morning, sooner if indications arise.      David Choi MD  Maternal Fetal Medicine

## 2023-09-30 NOTE — PROGRESS NOTES
Intensivist:  Repeat labs confirm acidosis on vbg 7.39/32 and ketones elevated 0.70.  this may be starvation induced (she has not eaten in the last 24 hours) vs occult dka related to gestational diabetes (although pt passed gd screen, it may now be induced by receiving IM steroids).  I discussed this with Dr. John of OB here and Dr. Choi of MFM at .  Dr. Choi is no longer comfortable accepting the patient to [facility] at .  We are working on getting a bed at  for her but finding one that could manage both a magnesium drip and an insulin drip is proving difficult.  In the meantime we will let her eat to help distinguish between these diagnoses, and the [facility] ICU staff will continue to manage her complications of pregnancy while we work on final disposition.   Yes

## 2023-09-30 NOTE — PROGRESS NOTES
[id number]    Pt AxO x4. Makes needs known, denies pain.   Tele SR. BP within goal range < 150.   Attempted to eat lunch, vomitted. Poor appetite.   Voids is BSC.     Report given to 10A RN. Transported via EMS with phone, backpacks, inhalers.

## 2023-09-30 NOTE — PROGRESS NOTES
Transfer Type: [facility]  Transfer Triage Note    Date of call: 23  Time of call: 1:51 PM    Current Patient Location:  [facility] ICU   Current Level of Care: ICU    Vitals: Temp: 98.4  F (36.9  C) Temp src: Oral BP: (!) 147/96 Pulse: 105   Resp: 14 SpO2: 99 %   Weight: 74.3 kg (163 lb 12.8 oz)  O2 Device: None (Room air) at    Diagnosis:   Is COVID-19 a concern? No  Reason for requested transfer: Further diagnostic work up, management, and consultation for specialized care   Isolation Needs: None    Outside Records: Available  Additional records may be faxed to [phone].    Transfer accepted: Yes  Stability of Patient: Currently stable but will need ICU level of care for now   Level of Care Needed: ICU  Telemetry Needed:  Cardiac Telemetry  Expected Time of Arrival for Transfer: 0-8 hours  Arrival Location:  [facility]        Additional Comments:     22 Y/O  29 weeks admitted to [facility] ICU for severe HTN with concern for preeclampsia. Was started on gtt but now off of it. She also was started on Mg gtt. Hospital stay c/b HAGMA with ketosis with possible starvation ketosis vs DKA. Started on insulin. Also she was given something to eat. We got M, [facility] ICU and [facility] ICU on a conference call. Dr. Ghotra also joined due to complexity of the situation. It was determined to accept the pt at the [facility] as ICU primary with [facility] as consult for now. M can take pt as primary once pt is more stable and able to take to L and D unit. [facility] would be more appropriate than east due to the gestational age and proximity to L and D in case it was needed emergently. Everyone agreed with the plan.     GILBERT BYRNE MD

## 2023-09-30 NOTE — ANESTHESIA PREPROCEDURE EVALUATION
Anesthesia Pre-Procedure Evaluation    Patient: Selena Dutton   MRN: [id number] : 1990        Procedure :           Past Medical History:   Diagnosis Date    Anxiety and depression     Moderate persistent asthma without complication       Past Surgical History:   Procedure Laterality Date    HC TOOTH EXTRACTION W/FORCEP      MOUTH SURGERY        Allergies   Allergen Reactions    Nuts Anaphylaxis    Cat Hair Extract     Peanut (Diagnostic)       Social History     Tobacco Use    Smoking status: Never    Smokeless tobacco: Never   Substance Use Topics    Alcohol use: Not Currently     Comment: 3 drinks/week      Wt Readings from Last 1 Encounters:   23 75.2 kg (165 lb 12.6 oz)        Anesthesia Evaluation            ROS/MED HX  ENT/Pulmonary:     (+)                     asthma                  Neurologic:     (+)      migraines,                          Cardiovascular:  - neg cardiovascular ROS   (+)  hypertension- -   -  - -                                 No previous cardiac testing     METS/Exercise Tolerance:     Hematologic: Comments:     (+)      anemia (11.3),          Musculoskeletal:  - neg musculoskeletal ROS     GI/Hepatic:     (+) GERD,                (-) liver disease   Renal/Genitourinary: Comment: HAGMA with ketosis - likely starvation vs less likely diabetic ketoacidosis 2/2 GDMA   (-) renal disease   Endo:    (-) obesity   Psychiatric/Substance Use:     (+) psychiatric history anxiety and depression       Infectious Disease:  - neg infectious disease ROS     Malignancy:  - neg malignancy ROS     Other:     (-) previous        Physical Exam    Airway        Mallampati: III   TM distance: > 3 FB   Neck ROM: full   Mouth opening: > 3 cm    Respiratory Devices and Support         Dental           Cardiovascular             Pulmonary                   OUTSIDE LABS:  CBC:   Lab Results   Component Value Date    WBC 20.1 (H) 2023    WBC 12.6 (H) 2023    HGB 11.3 (L)  09/30/2023    HGB 10.6 (L) 09/29/2023    HCT 33.0 (L) 09/30/2023    HCT 30.4 (L) 09/29/2023     09/30/2023     09/30/2023     BMP:   Lab Results   Component Value Date     (L) 09/30/2023     (L) 09/30/2023    POTASSIUM 3.2 (L) 09/30/2023    POTASSIUM 3.1 (L) 09/30/2023    POTASSIUM 3.1 (L) 09/30/2023    CHLORIDE 101 09/30/2023    CHLORIDE 100 09/30/2023    CO2 19 (L) 09/30/2023    CO2 16 (L) 09/30/2023    BUN 5.6 (L) 09/30/2023    BUN 6.2 09/30/2023    CR 0.69 09/30/2023    CR 0.68 09/30/2023     (H) 09/30/2023     (H) 09/30/2023     COAGS: No results found for: PTT, INR, FIBR  POC: No results found for: BGM, HCG, HCGS  HEPATIC:   Lab Results   Component Value Date    ALBUMIN 3.4 (L) 09/30/2023    ALBUMIN 3.2 (L) 09/30/2023    PROTTOTAL 6.3 (L) 09/30/2023    PROTTOTAL 6.4 09/30/2023    ALT 31 09/30/2023    ALT 31 09/30/2023    AST 39 09/30/2023    AST 41 09/30/2023    ALKPHOS 138 (H) 09/30/2023    ALKPHOS 141 (H) 09/30/2023    BILITOTAL 0.3 09/30/2023    BILITOTAL 0.3 09/30/2023     OTHER:   Lab Results   Component Value Date    A1C 5.1 09/30/2023    BERNARDA 6.4 (L) 09/30/2023       Anesthesia Plan    ASA Status:  3       Anesthesia Type: Spinal.              Consents            Postoperative Care            Comments:           neg OB ROS.       Vladimir Benton MD

## 2023-10-01 LAB
ABO/RH(D): NORMAL
ALBUMIN SERPL BCG-MCNC: 3.1 G/DL (ref 3.5–5.2)
ALBUMIN SERPL BCG-MCNC: 3.2 G/DL (ref 3.5–5.2)
ALP SERPL-CCNC: 113 U/L (ref 35–104)
ALP SERPL-CCNC: 125 U/L (ref 35–104)
ALT SERPL W P-5'-P-CCNC: 27 U/L (ref 0–50)
ALT SERPL W P-5'-P-CCNC: 31 U/L (ref 0–50)
ANION GAP SERPL CALCULATED.3IONS-SCNC: 11 MMOL/L (ref 7–15)
ANION GAP SERPL CALCULATED.3IONS-SCNC: 15 MMOL/L (ref 7–15)
ANION GAP SERPL CALCULATED.3IONS-SCNC: 15 MMOL/L (ref 7–15)
ANTIBODY SCREEN: NEGATIVE
AST SERPL W P-5'-P-CCNC: 30 U/L (ref 0–45)
AST SERPL W P-5'-P-CCNC: 39 U/L (ref 0–45)
B-OH-BUTYR SERPL-SCNC: 0.6 MMOL/L
B-OH-BUTYR SERPL-SCNC: <0.18 MMOL/L
BASE EXCESS BLDV CALC-SCNC: -3 MMOL/L (ref -7.7–1.9)
BASE EXCESS BLDV CALC-SCNC: -3.7 MMOL/L (ref -7.7–1.9)
BASOPHILS # BLD AUTO: 0 10E3/UL (ref 0–0.2)
BASOPHILS # BLD AUTO: 0 10E3/UL (ref 0–0.2)
BASOPHILS NFR BLD AUTO: 0 %
BASOPHILS NFR BLD AUTO: 0 %
BILIRUB SERPL-MCNC: 0.2 MG/DL
BILIRUB SERPL-MCNC: 0.2 MG/DL
BUN SERPL-MCNC: 10 MG/DL (ref 6–20)
BUN SERPL-MCNC: 5.4 MG/DL (ref 6–20)
BUN SERPL-MCNC: 6.1 MG/DL (ref 6–20)
CA-I BLD-MCNC: 2.8 MG/DL (ref 4.4–5.2)
CA-I BLD-MCNC: 3.3 MG/DL (ref 4.4–5.2)
CALCIUM SERPL-MCNC: 5.5 MG/DL (ref 8.6–10)
CALCIUM SERPL-MCNC: 6.3 MG/DL (ref 8.6–10)
CALCIUM SERPL-MCNC: 7.2 MG/DL (ref 8.6–10)
CHLORIDE SERPL-SCNC: 101 MMOL/L (ref 98–107)
CHLORIDE SERPL-SCNC: 102 MMOL/L (ref 98–107)
CHLORIDE SERPL-SCNC: 98 MMOL/L (ref 98–107)
CREAT SERPL-MCNC: 0.69 MG/DL (ref 0.51–0.95)
CREAT SERPL-MCNC: 0.72 MG/DL (ref 0.51–0.95)
CREAT SERPL-MCNC: 0.79 MG/DL (ref 0.51–0.95)
DEPRECATED HCO3 PLAS-SCNC: 16 MMOL/L (ref 22–29)
DEPRECATED HCO3 PLAS-SCNC: 17 MMOL/L (ref 22–29)
DEPRECATED HCO3 PLAS-SCNC: 20 MMOL/L (ref 22–29)
EGFRCR SERPLBLD CKD-EPI 2021: >90 ML/MIN/1.73M2
EOSINOPHIL # BLD AUTO: 0 10E3/UL (ref 0–0.7)
EOSINOPHIL # BLD AUTO: 0 10E3/UL (ref 0–0.7)
EOSINOPHIL NFR BLD AUTO: 0 %
EOSINOPHIL NFR BLD AUTO: 0 %
ERYTHROCYTE [DISTWIDTH] IN BLOOD BY AUTOMATED COUNT: 14.3 % (ref 10–15)
ERYTHROCYTE [DISTWIDTH] IN BLOOD BY AUTOMATED COUNT: 14.7 % (ref 10–15)
GLUCOSE BLDC GLUCOMTR-MCNC: 117 MG/DL (ref 70–99)
GLUCOSE BLDC GLUCOMTR-MCNC: 125 MG/DL (ref 70–99)
GLUCOSE BLDC GLUCOMTR-MCNC: 129 MG/DL (ref 70–99)
GLUCOSE BLDC GLUCOMTR-MCNC: 164 MG/DL (ref 70–99)
GLUCOSE BLDC GLUCOMTR-MCNC: 174 MG/DL (ref 70–99)
GLUCOSE SERPL-MCNC: 131 MG/DL (ref 70–99)
GLUCOSE SERPL-MCNC: 135 MG/DL (ref 70–99)
GLUCOSE SERPL-MCNC: 175 MG/DL (ref 70–99)
HCO3 BLDV-SCNC: 20 MMOL/L (ref 21–28)
HCO3 BLDV-SCNC: 21 MMOL/L (ref 21–28)
HCT VFR BLD AUTO: 29.7 % (ref 35–47)
HCT VFR BLD AUTO: 32 % (ref 35–47)
HGB BLD-MCNC: 10.1 G/DL (ref 11.7–15.7)
HGB BLD-MCNC: 10.9 G/DL (ref 11.7–15.7)
IMM GRANULOCYTES # BLD: 0.3 10E3/UL
IMM GRANULOCYTES # BLD: 0.3 10E3/UL
IMM GRANULOCYTES NFR BLD: 1 %
IMM GRANULOCYTES NFR BLD: 2 %
LYMPHOCYTES # BLD AUTO: 1.1 10E3/UL (ref 0.8–5.3)
LYMPHOCYTES # BLD AUTO: 1.4 10E3/UL (ref 0.8–5.3)
LYMPHOCYTES NFR BLD AUTO: 10 %
LYMPHOCYTES NFR BLD AUTO: 6 %
MAGNESIUM SERPL-MCNC: 5.1 MG/DL (ref 1.7–2.3)
MAGNESIUM SERPL-MCNC: 8.3 MG/DL (ref 1.7–2.3)
MCH RBC QN AUTO: 29.9 PG (ref 26.5–33)
MCH RBC QN AUTO: 30.8 PG (ref 26.5–33)
MCHC RBC AUTO-ENTMCNC: 34 G/DL (ref 31.5–36.5)
MCHC RBC AUTO-ENTMCNC: 34.1 G/DL (ref 31.5–36.5)
MCV RBC AUTO: 88 FL (ref 78–100)
MCV RBC AUTO: 91 FL (ref 78–100)
MONOCYTES # BLD AUTO: 0.5 10E3/UL (ref 0–1.3)
MONOCYTES # BLD AUTO: 0.7 10E3/UL (ref 0–1.3)
MONOCYTES NFR BLD AUTO: 3 %
MONOCYTES NFR BLD AUTO: 5 %
NEUTROPHILS # BLD AUTO: 12.4 10E3/UL (ref 1.6–8.3)
NEUTROPHILS # BLD AUTO: 16.7 10E3/UL (ref 1.6–8.3)
NEUTROPHILS NFR BLD AUTO: 83 %
NEUTROPHILS NFR BLD AUTO: 90 %
NRBC # BLD AUTO: 0 10E3/UL
NRBC # BLD AUTO: 0 10E3/UL
NRBC BLD AUTO-RTO: 0 /100
NRBC BLD AUTO-RTO: 0 /100
O2/TOTAL GAS SETTING VFR VENT: 18 %
O2/TOTAL GAS SETTING VFR VENT: 21 %
PCO2 BLDV: 31 MM HG (ref 40–50)
PCO2 BLDV: 32 MM HG (ref 40–50)
PH BLDV: 7.42 [PH] (ref 7.32–7.43)
PH BLDV: 7.42 [PH] (ref 7.32–7.43)
PHOSPHATE SERPL-MCNC: 3 MG/DL (ref 2.5–4.5)
PLATELET # BLD AUTO: 182 10E3/UL (ref 150–450)
PLATELET # BLD AUTO: 208 10E3/UL (ref 150–450)
PO2 BLDV: 56 MM HG (ref 25–47)
PO2 BLDV: 73 MM HG (ref 25–47)
POTASSIUM SERPL-SCNC: 3.6 MMOL/L (ref 3.4–5.3)
POTASSIUM SERPL-SCNC: 3.6 MMOL/L (ref 3.4–5.3)
POTASSIUM SERPL-SCNC: 4.1 MMOL/L (ref 3.4–5.3)
POTASSIUM SERPL-SCNC: 4.1 MMOL/L (ref 3.4–5.3)
PROT SERPL-MCNC: 5.7 G/DL (ref 6.4–8.3)
PROT SERPL-MCNC: 5.8 G/DL (ref 6.4–8.3)
RBC # BLD AUTO: 3.28 10E6/UL (ref 3.8–5.2)
RBC # BLD AUTO: 3.64 10E6/UL (ref 3.8–5.2)
SODIUM SERPL-SCNC: 129 MMOL/L (ref 135–145)
SODIUM SERPL-SCNC: 133 MMOL/L (ref 135–145)
SODIUM SERPL-SCNC: 133 MMOL/L (ref 135–145)
SPECIMEN EXPIRATION DATE: NORMAL
WBC # BLD AUTO: 15 10E3/UL (ref 4–11)
WBC # BLD AUTO: 18.7 10E3/UL (ref 4–11)

## 2023-10-01 PROCEDURE — 99232 SBSQ HOSP IP/OBS MODERATE 35: CPT

## 2023-10-01 PROCEDURE — 80053 COMPREHEN METABOLIC PANEL: CPT | Performed by: INTERNAL MEDICINE

## 2023-10-01 PROCEDURE — 82010 KETONE BODYS QUAN: CPT

## 2023-10-01 PROCEDURE — 85004 AUTOMATED DIFF WBC COUNT: CPT

## 2023-10-01 PROCEDURE — 82803 BLOOD GASES ANY COMBINATION: CPT

## 2023-10-01 PROCEDURE — 250N000013 HC RX MED GY IP 250 OP 250 PS 637

## 2023-10-01 PROCEDURE — 84100 ASSAY OF PHOSPHORUS: CPT

## 2023-10-01 PROCEDURE — 84132 ASSAY OF SERUM POTASSIUM: CPT

## 2023-10-01 PROCEDURE — 250N000013 HC RX MED GY IP 250 OP 250 PS 637: Performed by: INTERNAL MEDICINE

## 2023-10-01 PROCEDURE — 85025 COMPLETE CBC W/AUTO DIFF WBC: CPT | Performed by: INTERNAL MEDICINE

## 2023-10-01 PROCEDURE — 82330 ASSAY OF CALCIUM: CPT

## 2023-10-01 PROCEDURE — 250N000013 HC RX MED GY IP 250 OP 250 PS 637: Performed by: OBSTETRICS & GYNECOLOGY

## 2023-10-01 PROCEDURE — 258N000003 HC RX IP 258 OP 636

## 2023-10-01 PROCEDURE — 99232 SBSQ HOSP IP/OBS MODERATE 35: CPT | Mod: 25 | Performed by: OBSTETRICS & GYNECOLOGY

## 2023-10-01 PROCEDURE — 120N000002 HC R&B MED SURG/OB UMMC

## 2023-10-01 PROCEDURE — 250N000011 HC RX IP 250 OP 636

## 2023-10-01 PROCEDURE — 83735 ASSAY OF MAGNESIUM: CPT

## 2023-10-01 PROCEDURE — 36415 COLL VENOUS BLD VENIPUNCTURE: CPT

## 2023-10-01 PROCEDURE — 250N000012 HC RX MED GY IP 250 OP 636 PS 637: Performed by: INTERNAL MEDICINE

## 2023-10-01 PROCEDURE — 99231 SBSQ HOSP IP/OBS SF/LOW 25: CPT | Performed by: NURSE PRACTITIONER

## 2023-10-01 RX ORDER — LIDOCAINE 40 MG/G
CREAM TOPICAL
Status: DISCONTINUED | OUTPATIENT
Start: 2023-10-01 | End: 2023-10-01

## 2023-10-01 RX ORDER — LABETALOL 200 MG/1
200 TABLET, FILM COATED ORAL EVERY 8 HOURS SCHEDULED
Status: DISCONTINUED | OUTPATIENT
Start: 2023-10-01 | End: 2023-10-01

## 2023-10-01 RX ORDER — NIFEDIPINE 30 MG/1
30 TABLET, EXTENDED RELEASE ORAL ONCE
Status: COMPLETED | OUTPATIENT
Start: 2023-10-01 | End: 2023-10-01

## 2023-10-01 RX ORDER — LABETALOL HYDROCHLORIDE 5 MG/ML
20-80 INJECTION, SOLUTION INTRAVENOUS EVERY 10 MIN PRN
Status: DISCONTINUED | OUTPATIENT
Start: 2023-10-01 | End: 2023-10-07 | Stop reason: HOSPADM

## 2023-10-01 RX ORDER — POTASSIUM CHLORIDE 1500 MG/1
20 TABLET, EXTENDED RELEASE ORAL ONCE
Status: COMPLETED | OUTPATIENT
Start: 2023-10-01 | End: 2023-10-01

## 2023-10-01 RX ORDER — CALCIUM GLUCONATE 20 MG/ML
2 INJECTION, SOLUTION INTRAVENOUS ONCE
Status: COMPLETED | OUTPATIENT
Start: 2023-10-01 | End: 2023-10-01

## 2023-10-01 RX ORDER — LABETALOL 200 MG/1
200 TABLET, FILM COATED ORAL EVERY 12 HOURS SCHEDULED
Status: DISCONTINUED | OUTPATIENT
Start: 2023-10-01 | End: 2023-10-01

## 2023-10-01 RX ORDER — SODIUM CHLORIDE, SODIUM LACTATE, POTASSIUM CHLORIDE, CALCIUM CHLORIDE 600; 310; 30; 20 MG/100ML; MG/100ML; MG/100ML; MG/100ML
10-125 INJECTION, SOLUTION INTRAVENOUS CONTINUOUS
Status: DISCONTINUED | OUTPATIENT
Start: 2023-10-01 | End: 2023-10-03

## 2023-10-01 RX ORDER — HYDRALAZINE HYDROCHLORIDE 20 MG/ML
10 INJECTION INTRAMUSCULAR; INTRAVENOUS
Status: DISCONTINUED | OUTPATIENT
Start: 2023-10-01 | End: 2023-10-07 | Stop reason: HOSPADM

## 2023-10-01 RX ORDER — NIFEDIPINE 30 MG/1
60 TABLET, EXTENDED RELEASE ORAL 2 TIMES DAILY
Status: DISCONTINUED | OUTPATIENT
Start: 2023-10-01 | End: 2023-10-07 | Stop reason: HOSPADM

## 2023-10-01 RX ORDER — DEXTROSE MONOHYDRATE, SODIUM CHLORIDE, AND POTASSIUM CHLORIDE 50; 1.49; 4.5 G/1000ML; G/1000ML; G/1000ML
INJECTION, SOLUTION INTRAVENOUS CONTINUOUS
Status: DISCONTINUED | OUTPATIENT
Start: 2023-10-01 | End: 2023-10-03

## 2023-10-01 RX ORDER — CALCIUM CARBONATE 500 MG/1
1000 TABLET, CHEWABLE ORAL 3 TIMES DAILY
Status: DISCONTINUED | OUTPATIENT
Start: 2023-10-01 | End: 2023-10-07 | Stop reason: HOSPADM

## 2023-10-01 RX ORDER — LABETALOL 300 MG/1
300 TABLET, FILM COATED ORAL EVERY 8 HOURS SCHEDULED
Status: DISCONTINUED | OUTPATIENT
Start: 2023-10-01 | End: 2023-10-02

## 2023-10-01 RX ORDER — CALCIUM GLUCONATE 20 MG/ML
1 INJECTION, SOLUTION INTRAVENOUS ONCE
Status: DISCONTINUED | OUTPATIENT
Start: 2023-10-01 | End: 2023-10-01

## 2023-10-01 RX ADMIN — LABETALOL HYDROCHLORIDE 20 MG: 5 INJECTION, SOLUTION INTRAVENOUS at 09:41

## 2023-10-01 RX ADMIN — FLUTICASONE PROPIONATE 1 PUFF: 110 AEROSOL, METERED RESPIRATORY (INHALATION) at 07:52

## 2023-10-01 RX ADMIN — ALBUTEROL SULFATE 2 PUFF: 90 INHALANT RESPIRATORY (INHALATION) at 22:09

## 2023-10-01 RX ADMIN — CALCIUM CARBONATE (ANTACID) CHEW TAB 500 MG 1000 MG: 500 CHEW TAB at 19:46

## 2023-10-01 RX ADMIN — LABETALOL HYDROCHLORIDE 200 MG: 200 TABLET, FILM COATED ORAL at 15:32

## 2023-10-01 RX ADMIN — FLUTICASONE PROPIONATE 1 PUFF: 110 AEROSOL, METERED RESPIRATORY (INHALATION) at 22:10

## 2023-10-01 RX ADMIN — CALCIUM CARBONATE (ANTACID) CHEW TAB 500 MG 1000 MG: 500 CHEW TAB at 17:27

## 2023-10-01 RX ADMIN — NIFEDIPINE 60 MG: 30 TABLET, FILM COATED, EXTENDED RELEASE ORAL at 19:45

## 2023-10-01 RX ADMIN — CALCIUM GLUCONATE 2 G: 20 INJECTION, SOLUTION INTRAVENOUS at 05:36

## 2023-10-01 RX ADMIN — MONTELUKAST 10 MG: 10 TABLET, FILM COATED ORAL at 22:07

## 2023-10-01 RX ADMIN — LABETALOL HYDROCHLORIDE 300 MG: 300 TABLET, FILM COATED ORAL at 22:07

## 2023-10-01 RX ADMIN — NIFEDIPINE 30 MG: 30 TABLET, FILM COATED, EXTENDED RELEASE ORAL at 08:39

## 2023-10-01 RX ADMIN — POTASSIUM CHLORIDE 20 MEQ: 1500 TABLET, EXTENDED RELEASE ORAL at 07:50

## 2023-10-01 RX ADMIN — SERTRALINE HYDROCHLORIDE 100 MG: 100 TABLET ORAL at 19:46

## 2023-10-01 RX ADMIN — INSULIN ASPART 1 UNITS: 100 INJECTION, SOLUTION INTRAVENOUS; SUBCUTANEOUS at 05:52

## 2023-10-01 RX ADMIN — DEXTROSE AND SODIUM CHLORIDE: 5; 450 INJECTION, SOLUTION INTRAVENOUS at 04:43

## 2023-10-01 RX ADMIN — POTASSIUM CHLORIDE, DEXTROSE MONOHYDRATE AND SODIUM CHLORIDE: 150; 5; 450 INJECTION, SOLUTION INTRAVENOUS at 07:42

## 2023-10-01 RX ADMIN — NIFEDIPINE 30 MG: 30 TABLET, FILM COATED, EXTENDED RELEASE ORAL at 07:50

## 2023-10-01 ASSESSMENT — ACTIVITIES OF DAILY LIVING (ADL)
ADLS_ACUITY_SCORE: 22

## 2023-10-01 NOTE — PROGRESS NOTES
Brief Progress note     Notified by ICU team that patient feeling unwell with leg muscle weakness. Went to evaluate patient. Patient denies any specific symptoms. Denies headache, vision changes, RUQ pain.     Vitals:    10/01/23 0300 10/01/23 0400 10/01/23 0500 10/01/23 0600   BP: (!) 139/93 (!) 145/101 (!) 142/81 132/79   BP Location:       Cuff Size:       Pulse: 103 103 115 100   Resp: 20 19 21 26   Temp:       TempSrc:       SpO2: 98% 100% 97% 95%   Weight:         Gen: alert oriented   Cardiac: RRR  Pulm: CTAB  Extremities: 2+patellar reflexes bilaterally, 1-2 + edema bilaterally     A/P patient 29w5 days admitted to the ICU for severe preeclampsia. Course also complicated by metabolic acidosis of unclear etiology.     Patient has been on Magnesium for ~30 hours. Blood pressures better controlled. Mag stopped, mag level drawn. Mag level returned at 8.3. Discussed with staff as well as staff. Okay to hold the Magnesium. Will continue to watch Bps with likely up titration of Nifedipine today.     Iris Magallon MD   OBGYN resident PGY3  10/01/2023 7:16 AM

## 2023-10-01 NOTE — PROGRESS NOTES
Transferred to: Unit Antepartum  at 1430  Belongings:   Pugh removed? No: none  Central line removed? NA  Chart and medications sent with patient Yes  Family notified: Yes    Taken by wheelchair by KATHERINE Webb. Report given

## 2023-10-01 NOTE — PROGRESS NOTES
Patient on continuous monitoring overnight due to being on magnesium. RN was at bedside to adjust EFM and reposition patient as needed. See flowsheets for EFM and toco documentation. FHT reviewed with Dr. Magallon. Pt endorses positive fetal movement and denies vaginal bleeding or leaking of fluids. All other cares performed by ICU nursing staff.

## 2023-10-01 NOTE — PROGRESS NOTES
[facility] ICU PROGRESS NOTE  10/01/2023      Date of Service (when I saw the patient): 10/01/2023    ASSESSMENT:   Selena Dutton is a 32 year old female with a medical history significant for asthma who is  29 weeks 3 days who initially presented to [facility] on 2023 following an US appointment where her Blood pressures were found to be 170s-180s/100s-110s. She was started on nicardipine and magnesium infusions with resolution of her hypertension. The morning of 2023 saw a rise in her anion gap and serum ketones and she was ultimately transferred to [facility] ICU for co-management with the [facility] team and the ICU team for starvation ketosis and pre-eclampsia.     INTERVAL HISTORY:   Notes, labs, vitals reviewed. Overnight, patient was noted to have hypermagnesemia and hypocalcemia. Her magnesium infusion was stopped. Her ketones continued to be present, but are down trending. No other acute events, patient is feeling well this morning.     CHANGES and MAJOR THINGS TODAY:   Changed continuous fluids to include sodium and potassium  Recheck chemistry panel at 1000  Monitor HELLP labs    PLAN/    Neuro:  # Depression  ~ Initially reported HA with her hypertension, now resolved.   Monitor Neurological Status  PTA sertraline, 100 mg daily     Pulmonary:  # Asthma  ~ Not well controlled during pregnancy.  SpO2 > 95%  Supplemental Oxygen as needed   PTA albuterol inhaler, 2 puff every 6 hours  PTA montelukast, 10 mg at bedtime     Cardiovascular:  # Hypertension  ~ Started on a nicardipine infusion at [facility] due to BPs 180s/110s, now has been off the drip.   SBP goal < 160  MAP goal > 65  Cardiac Monitoring  Nifedipine, 30 mg in the AM, 60 mg in the PM  OB Short Acting AntiHypertensive protocols  Labetalol, 20 mg followed up 40 mg in 20 minutes if blood pressures remain out of target range  Continue to give labetalol every 20 minutes, doubling the dose each time, until blood pressure returns to  target range.   Hydralazine, 10 mg every 20 mins as needed for SBP > 160     GI/Nutrition:  # Nausea  Diet: Clear liquid diet (precautionary in case she needs to deliver)  Zofran, 4 mg IV as needed for nausea     Renal/Fluids/Electrolytes:  # AGMA, resolved  ~ Likely secondary to starvation ketosis  ~ Anion gap up to 18 at [facility], down to 14 on arrival to ICU.  Daily Weights  Monitor I&O  RN Managed Electrolyte Replacement protocols  CMP, daily and on arrival to ICU  Anion gap remains closed morning of 10/1     Endocrine:  # Starvation Ketosis, improving  ~ Serum ketones up to 0.70 at [facility], remain at 0.70 after arrival to ICU, now down to 0.6.  ~ Given patient's poor oral intake and recent nausea, most likely secondary to starvation rather than undiagnosed diabetes.   Started on D5 overnight, ketones dropped slightly  Changed to D5 1/2NS w/ 20K this morning and increased rate to 100 mL per hour  Encourage some oral intake this morning  Sliding scale insulin, low resistance, before meals and bedtime  Recheck ketones at 1000     Obstetrics  #  29 weeks 3 days  # Pre-Eclampsia  Hypertensive management per cardiology section  Continue to monitor platelets, creatinine, and LFTs  Magnesium infusion on HOLD this morning due to hypermagnesemia and hypocalcemia  Betamethasone dosing completed ( + )  Monitor HELLP labs every 12 hours  Fetal monitoring per ante-partum nursing staff     ID:  # No current concerns  Monitor fever curve and WBCs     Musculoskeletal:  # No Current Concerns  Up ad bre     Skin:  # No Current Concerns  Diligent skin cares per nursing       General Cares/Prophylaxis:    DVT Prophylaxis: Pneumatic Compression Devices  GI Prophylaxis: Not indicated  Restraints: Not Indicated  Family Communication: Updated at bedside  Code Status: Full Code    Lines/tubes/drains:  ~ PIV     Disposition:  ~ [facility] ICU  ~ Transfer to [facility] unit    Patient seen and findings/plan discussed with  medical ICU staff, Dr. Morales.      I spent 40 minutes providing care for this patient.     Willard Jack AG-ACNP  Pager #[phone]  Critical Care  [facility]     ====================================    OBJECTIVE:   1. VITAL SIGNS:   Temp:  [98.1  F (36.7  C)-98.3  F (36.8  C)] 98.1  F (36.7  C)  Pulse:  [] 100  Resp:  [14-30] 26  BP: (129-161)/() 132/79  SpO2:  [95 %-100 %] 95 %  Resp: 26    2. INTAKE/ OUTPUT:   I/O last 3 completed shifts:  In: 1374.16 [I.V.:1374.16]  Out: 1750 [Urine:1750]    3. Physical Exam  HENT:      Head: Normocephalic.      Mouth/Throat:      Mouth: Mucous membranes are moist.      Pharynx: Oropharynx is clear.   Eyes:      Extraocular Movements: Extraocular movements intact.      Conjunctiva/sclera: Conjunctivae normal.      Pupils: Pupils are equal, round, and reactive to light.   Cardiovascular:      Rate and Rhythm: Regular rhythm. Tachycardia present.   Pulmonary:      Effort: Pulmonary effort is normal.      Breath sounds: Normal breath sounds.   Abdominal:      Comments: Patient is pregnant.   Musculoskeletal:         General: Normal range of motion.      Cervical back: Normal range of motion.   Skin:     General: Skin is warm.   Neurological:      General: No focal deficit present.      Mental Status: She is alert and oriented to person, place, and time.           4. LABS:   Arterial Blood Gases   No lab results found in last 7 days.  Complete Blood Count   Recent Labs   Lab 10/01/23  0322 09/30/23  1605 09/30/23  0506 09/29/23  1705   WBC 18.7*  --  20.1* 12.6*   HGB 10.9*  --  11.3* 10.6*    213 193 159     Basic Metabolic Panel  Recent Labs   Lab 10/01/23  0527 10/01/23  0322 10/01/23  0145 09/30/23  2319 09/30/23  2206 09/30/23  1605 09/30/23  1326 09/30/23  1034 09/30/23  0950 09/30/23  0506   NA  --  129*  --   --   --  134*  --  134*  --  133*   POTASSIUM  --  3.6  3.6  --  3.3*  --  3.2*  --  3.1*  3.1*  --  3.2*   CHLORIDE  --  98   --   --   --  101  --  100  --  102   CO2  --  16*  --   --   --  19*  --  16*  --  16*   BUN  --  5.4*  --   --   --  5.6*  --  6.2  --  6.6   CR  --  0.72  --   --   --  0.68  0.69  --  0.75  --  0.75   * 175* 174*  --  161* 121*   < > 146*   < > 172*    < > = values in this interval not displayed.     Liver Function Tests  Recent Labs   Lab 10/01/23  0322 09/30/23  1605 09/30/23  0506 09/29/23  1705   AST 39 41  39 40 37   ALT 31 31  31 31 27   ALKPHOS 125* 141*  138* 141* 125*   BILITOTAL 0.2 0.3  0.3 0.3 0.2   ALBUMIN 3.1* 3.2*  3.4* 3.1* 3.1*     Coagulation Profile  No lab results found in last 7 days.    5. RADIOLOGY:   No results found for this or any previous visit (from the past 24 hour(s)).

## 2023-10-01 NOTE — PROGRESS NOTES
ICU Overnight Cross Cover:    Received a call from the bedside RN for patient explaining that the patient's knees were beginning to buckle when getting out of bed to void. Patient confirmed knee buckling and stated that she is feeling subjectively weaker.    Assessed patient's deep tendon reflexes and ordered a STAT magnesium level, since patient is on continuous magnesium sulfate infusion at 2g/hr.     Text page sent to [facility] resident, Iris Magallon, to update them and their team. When asked what range the patient's magnesium level needs to be, Dr. Magallon stated that they are wanting to keep it between 4.8 - 8.4 mg/dL.     Physical Exam  Neurological:      Motor: Weakness present. No seizure activity.      Deep Tendon Reflexes:      Reflex Scores:       Tricep reflexes are 1+ on the right side and 1+ on the left side.       Bicep reflexes are 1+ on the right side and 1+ on the left side.       Brachioradialis reflexes are 1+ on the right side and 1+ on the left side.       Patellar reflexes are 2+ on the right side and 2+ on the left side.        Magnesium level:  Magnesium   Date Value Ref Range Status   10/01/2023 8.3 (HH) 1.7 - 2.3 mg/dL Final       Georgi César, APRN CNP  10/1/2023  3:12 AM      Addendum:  Patient's serum and ionized calcium levels critically low, with serum calcium at 5.5 mg/dL and ionized calcium at 2.8 mg/dL. Communicated this to the [facility] [employer] team and, in the context of the patient's hypermagnesemia, the decision was made to stop and hold the magnesium infusion and give 2g calcium gluconate infusion. Orders for BMP, Mg, and ionized calcium for 1000 this morning.    Patient's ketone level only marginally decreased with addition of D5W infusion at start of night shift. Patient's sodium levels decreased to 129, as well. Dextrose infusion changed to D5 1/2NS and the rate doubled to 100 ml/hr. Ketones to be rechecked at 1000, as well.      Recent Labs   Lab 10/01/23  7431 10/01/23  8451  10/01/23  0145 09/30/23  2319 09/30/23  2206 09/30/23  1605 09/30/23  1326 09/30/23  1034 09/30/23  0950 09/30/23  0506 09/29/23  1705   WBC  --  18.7*  --   --   --   --   --   --   --  20.1* 12.6*   HGB  --  10.9*  --   --   --   --   --   --   --  11.3* 10.6*   MCV  --  88  --   --   --   --   --   --   --  87 88   PLT  --  208  --   --   --  213  --   --   --  193 159   NA  --  129*  --   --   --  134*  --  134*  --  133* 139   POTASSIUM  --  3.6  3.6  --  3.3*  --  3.2*  --  3.1*  3.1*  --  3.2* 3.2*   CHLORIDE  --  98  --   --   --  101  --  100  --  102 106   CO2  --  16*  --   --   --  19*  --  16*  --  16* 22   BUN  --  5.4*  --   --   --  5.6*  --  6.2  --  6.6 6.3   CR  --  0.72  --   --   --  0.68  0.69  --  0.75  --  0.75 0.83   ANIONGAP  --  15  --   --   --  14  --  18*  --  15 11   BERNARDA  --  5.5*  --   --   --  6.4*  --  6.8*  --  7.5* 8.7   * 175* 174*  --    < > 121*   < > 146*   < > 172* 81   ALBUMIN  --  3.1*  --   --   --  3.2*  3.4*  --   --   --  3.1* 3.1*   PROTTOTAL  --  5.8*  --   --   --  6.4  6.3*  --   --   --  6.2* 5.8*   BILITOTAL  --  0.2  --   --   --  0.3  0.3  --   --   --  0.3 0.2   ALKPHOS  --  125*  --   --   --  141*  138*  --   --   --  141* 125*   ALT  --  31  --   --   --  31  31  --   --   --  31 27   AST  --  39  --   --   --  41  39  --   --   --  40 37    < > = values in this interval not displayed.     Venous Blood Gas  Recent Labs   Lab 10/01/23  0322 09/30/23  1035   PHV 7.42 7.39   PCO2V 31* 32*   PO2V 73* 48*   HCO3V 20* 20*   MELISSA -3.7 -4.3   O2PER 21 0       KURT Crabtree CNP  10/1/2023  5:43 AM

## 2023-10-01 NOTE — PROGRESS NOTES
Maternal Fetal Medicine Progress Note    Patient: Selena Dutton  : 1990  MRN: [id number]    Today's Date: 10/1/2023  HD#2    Doing well. Her mother is in the room with her as well taking notes on our discussion today.   Feels better being off of the magnesium. Denies contractions/cramping. Denies vaginal leakage of fluid. Denies vaginal bleeding. Reports adequate fetal movements.   Denies headaches, visual changes, RUQ abdominal pain. She clarifies that she has never had a headache while being hospitalized, but that she had a headache prior to hospitalization.   Feels up to eating today, and is hoping that she would be able to.     Vitals:    10/01/23 0500 10/01/23 0600 10/01/23 0700 10/01/23 0800   BP: (!) 142/81 132/79 (!) 152/96 (S) (!) 162/95   BP Location:       Cuff Size:    Adult Regular   Pulse: 115 100 105 103   Resp: 21 26 23 21   Temp:    98.5  F (36.9  C)   TempSrc:    Oral   SpO2: 97% 95% 99% 98%   Weight:         General Appearance: No acute distress. Awake, alert and oriented.   Abdomen: Gravid.   Pelvic: Deferred    FHT: baseline 130 bpm, moderate variability, accels present (10 by 10), no decels.   Norphlet: None    Labs: (10/1)   WBC: 18.7  Hgb 11.3  Plt: 193K    Last Comprehensive Metabolic Panel:  Lab Results   Component Value Date     (L) 10/01/2023    POTASSIUM 3.6 10/01/2023    POTASSIUM 3.6 10/01/2023    CHLORIDE 98 10/01/2023    CO2 16 (L) 10/01/2023    ANIONGAP 15 10/01/2023     (H) 10/01/2023    BUN 5.4 (L) 10/01/2023    CR 0.72 10/01/2023    GFRESTIMATED >90 10/01/2023    BERNARDA 5.5 (LL) 10/01/2023     AST: 39 (10/1)   ALT: 31 (10/1)      Ketone: 0.60  Anion Gap: 15    Imaging:   [facility] Comprehensive fetal US ()                                                                  IMPRESSION:  1) Abernathy intrauterine pregnancy at 29w 3d gestational age.  2) None of the anomalies commonly detected by ultrasound were evident in the detailed fetal anatomic survey described above,  although evaluation of fetal anatomy was  suboptimal as noted above.  3) Growth parameters and estimated fetal weight were consistent with fetal growth restriction.  4) The amniotic fluid volume appeared normal.  5) The NST is reactive.  6) The umbilical artery Doppler is within normal limits.      ASSESSMENT/PLAN:   Selena Dutton is a 32 year old  at 29w5d by LMP c/w 9w3d US HD#2 admitted to the ICU as a transfer of care from [facility] in the setting of new diagnosis of pre-eclampsia with severe features by blood pressure criteria and proteinuria also complicated by acidosis suspected due to starvation ketosis due to limiting of oral intake while hospitalized.     #Pre-eclampsia w/ SF (severe hypertension and proteinuria)  - Serial BP monitoring to keep BP < 160/110  - Overnight, BP have been low-high mild range with non-sustained severe range BP   - Antihypertensives: S/p Nicardipine drip. Currently on Nifedipine 60 mg BID, increased from 30mgAM/60mg PM regimen. Consider initiation of PO labetalol therapy at 200 mg BID dosing along with current Nifedipine use to help optimize BP readings <160 mmHg/110 mmHg. Max daily dosing of labetalol is 2400 mg in divided doses (BID or TID).  Will give IV antihypertensive medications (ie IV labetalol and/or IV hydralazine protocol/algorithm) for acute management of sustained severe range BPs (greater or equal to 160mmHg/110mmHg), if needed.     - Labs: HELLP labs wnl, UPC 0.67; repeat q12 hrs and if further stability, consider spacing them out to daily checks followed by q72h checks   - Daily weights, strict I&Os  - S/p IV magnesium for eclampsia prophylaxis. Will re-initiate,  if signs and symptoms of pre-eclampsia worsen.  - Signs of worsening pre-eclampsia with severe features that would warrant immediate delivery include (but are not present now):   Uncontrolled severe-range BP (systolic 160 or diastolic 110 or greater) not responsive to antiHTN  medication  Persistent headaches, refractory to treatment  Epigastric pain or RUQ pain unresponsive to repeat analgesics  Visual disturbances, motor deficit or altered sensorium  Stroke   Myocardial infarction  HELLP syndrome  New or worsening renal dysfunction (serum creatinine > 1.1. mg/dl or twice baseline)  Elevated liver enzymes (more than twice upper limit of normal for AST and ALT)   Platelets (less than 100K)   Pulmonary edema  Eclampsia  Suspected placental abruption or vaginal bleeding in the absence of placenta previa     #Anion gap metabolic acidosis, suspect starvation ketosis   - Has been managed by the ICU team with serial laboratory evaluation; appreciate coordination of care of this   - Resolving and patient now to resume regular diet to help further  - If acidosis/ketosis corrected, okay to take patient on the antepartum unit for labor and delivery - please page [phone] ([facility] resident) to facilitate transfer of care)  - Previously, discussed, but low suspicion for occult DKA given negative screening for gestational diabetes and normal Hgb A1C     #FGR  - Newly diagnosed on US 9/29 w/ EFW 9%tile, UAD wnl.   - Will continue with q3 week growth ultrasound while inpatient and weekly BPPs (had on Friday in [facility] clinic) with umbilical artery Doppler resistance.     # FWB:   - Currently on TID monitoring since discontinuation of magnesium; has been appropriate for gestational age since admission  - S/p betamethasone course (9/29-90/30 at [id number])  - Will restart IV magnesium for neuroprotection - if delivery anticipated in next 12 hours and gestational age less than 32w (unless initiation of IV magnesium superceded for worsening pre-eclampsia with severe features)   - NICU for delivery, s/p consult (called today to review previous discussions they will revisit with patient today).    - S/p anesthesia consult   - Intrauterine resuscitative measures prn  - Signs of worsening fetal status warranting  immediate delivery which are not present now include:   Abnormal fetal testing (non-reassuring fetal monitoring)   Persistent reversed end-diastolic flow in the umbilical artery.     #MWB  - comfort care measures, as needed  - Up ad bre  - regular diet   - patient expressed to us that in regards to life saving interventions were needed, that her life be addressed before her baby's life; assured patient that it in all likelihood would not come to this predicament and that there is a high likelihood that both her and her baby will have favorable outcome; patient expressed reassurance about this     # PNC  - Rh +, Rubella immune, ,   - collect GBS   - Other prenatal labs wnl  - Imaging: placenta posterior                  #Anxiety/Depression  - Continue PTA Zoloft      #Asthma  - Worse in pregnancy   - PTA Singulair, Flovent, Albuterol   - Avoid hemabate in the event of delivery w/ postpartum hemorrhage      #Delivery planning:   - Patient cephalic on US yesterday so candidate for vaginal delivery pending stability.   - Discussed the fact that pre-eclampsia is not necessary an indication for  section and that induction of labor is a possibility if delivery warranted. Reiterated however, that there are indications for  delivery if delivery needs to be expedited for example, severely elevated blood pressures that are unable to be managed (please see ACOG guidelines below).  - We also reviewed that in the event a  section is needed, the possibility of classical uterine incision if the lower uterine segment is to narrow for a transverse low segment incision; also discussed the consequences of a classical uterine incision in relation to  deliveries needed for future pregnancies at 36 weeks gestation  - We reviewed risks of  section including but not limited to bleeding, infection, need for blood transfusion, injury to surrounding organs (ie bowel/intestines, bladder, ureters,  major blood vessels and nerves)., which if injured, then will be identified and repaired at time of surgery or will be repaired by surgeons that specialize in those areas. Also reviewed unintended injuries going unnoticed at the time of surgery, and that would require additional surgery to be done to have them repaired. Also reviewed risk of fetal injury and possible  hysterectomy for life threatening bleeding.   - Patient signed consent form for primary  section and verbally consented to blood transfusion if needed after conveying understanding of all that was discussed above; questions and concerns were addressed to her satisfaction  - Plan will be to deliver at 34 weeks given diagnosis of pre-eclampsia with severe features.     Patient was seen and care plan managed under the supervision of Dr. Choi. Care plan and also discussed with ICU staff.      OB/MFM in house senior resident is available  (page OB resident [phone] or if not answering contact labor and delivery [phone] ask for charge nurse). MFM faculty on call until 10/1 at 7 am Dr. David Boss MD  Maternal Fetal Medicine Fellow    Physician Attestation   I, David Choi MD, saw this patient with the fellow and agree with the findings and plan of care as documented in the resident s note.       I personally reviewed vital signs, medications, and labs and discussed all the above with the patient today at the bedside in the ICU. I was present during obtaining of consent for C/S and blood transfusion as well today.     Key findings: At this time she meets criteria for ongoing expectant management but is at a high risk of needing urgent delivery for worsening of preeclampsia with severe features. I anticipate transfer out of ICU today with improving ketone levels with food intake.      David Choi MD  Date of Service (when I saw the patient): .10/01/23         Time Spent on this Encounter   I  personally spent a total of 40 minutes, including both face-to-face and non-face-to-face on the date of the encounter, addressing the above diagnosis. Activities performed in this time include chart review, obtaining / reviewing history, performing a medically necessary evaluation, documentation and Charge activities: counseling and care coordination, equivalent to medical decision making that is of high complexity.

## 2023-10-01 NOTE — PHARMACY-ADMISSION MEDICATION HISTORY
Pharmacist Admission Medication History    Admission medication history is complete. The information provided in this note is only as accurate as the sources available at the time of the update.    Medication reconciliation/reorder completed by provider prior to medication history? Yes    Information Source(s): Patient, Clinic records, and CareEverywhere/SureScripts via in-person    Pertinent Information: n/a    Changes made to PTA medication list:  Added: None  Deleted: ondansetron, vitamin B6  Changed: None    Medication Affordability: not assessed       Allergies reviewed with patient and updates made in EHR: yes    Medication History Completed By: Yina Lowery [facility] 9/30/2023 7:05 PM    Prior to Admission medications    Medication Sig Last Dose Taking? Auth Provider Long Term End Date   albuterol (PROAIR HFA/PROVENTIL HFA/VENTOLIN HFA) 108 (90 Base) MCG/ACT inhaler Inhale 2 puffs into the lungs every 6 hours   Hazel Ghotra MD Yes    fluticasone (FLOVENT HFA) 110 MCG/ACT inhaler Inhale 1 puff into the lungs 2 times daily   Hazel Ghotra MD Yes    montelukast (SINGULAIR) 10 MG tablet Take 1 tablet (10 mg) by mouth At Bedtime   Sary Meyer APRN CNP Yes    Prenat w/o [id number] (PNV-DHA PO) Take 1 tablet by mouth daily   Reported, Patient     sertraline (ZOLOFT) 100 MG tablet Take 1 tablet (100 mg) by mouth daily   Sary Meyer APRN CNP Yes

## 2023-10-01 NOTE — PLAN OF CARE
10A ICU End of Shift Summary.     Changes this shift: Magnesium drip stopped at 0443 due to patient becoming more weak and episode of knees buckling, see GWENDOLYN Georgi's note for further details.  Neuro: A&Ox 4. Deep tendon reflex assessment performed by GWENDOLYN Erydel.  Cardiac: Sinus tach, HR in low 100s.  Respiratory: O2 sats stable on room air. Albuterol inhaler given once due to shortness of breath. Clear lung sounds throughout.   GI/: Continent of bowel and bladder. LBM 9/29. Voiding adequate amount of clear yellow urine. Denies nausea.  Diet/appetite: Clear liquids  Pain: Denies  Skin: WDL. Patient able to turn self independently in bed.  LDA's: PIV to L hand and R hand.  Activities: Up with 2 assist stand and pivot to commode. Erydel GWENDOLYN notified of knees buckling.    Drips: D5 1/2NS at 100mL/hr.     Fetal heart monitoring performed by L&D staff.  Plan: safety,   See flowsheets for vital signs and detailed assessment.

## 2023-10-02 LAB
ALBUMIN SERPL BCG-MCNC: 3.2 G/DL (ref 3.5–5.2)
ALBUMIN SERPL BCG-MCNC: 3.3 G/DL (ref 3.5–5.2)
ALP SERPL-CCNC: 110 U/L (ref 35–104)
ALP SERPL-CCNC: 117 U/L (ref 35–104)
ALT SERPL W P-5'-P-CCNC: 30 U/L (ref 0–50)
ALT SERPL W P-5'-P-CCNC: 30 U/L (ref 0–50)
ANION GAP SERPL CALCULATED.3IONS-SCNC: 11 MMOL/L (ref 7–15)
ANION GAP SERPL CALCULATED.3IONS-SCNC: 11 MMOL/L (ref 7–15)
APTT PPP: 23 SECONDS (ref 22–38)
AST SERPL W P-5'-P-CCNC: 34 U/L (ref 0–45)
AST SERPL W P-5'-P-CCNC: 35 U/L (ref 0–45)
BASOPHILS # BLD AUTO: 0 10E3/UL (ref 0–0.2)
BASOPHILS NFR BLD AUTO: 0 %
BILIRUB SERPL-MCNC: 0.2 MG/DL
BILIRUB SERPL-MCNC: 0.2 MG/DL
BUN SERPL-MCNC: 14.2 MG/DL (ref 6–20)
BUN SERPL-MCNC: 15.5 MG/DL (ref 6–20)
CALCIUM SERPL-MCNC: 8 MG/DL (ref 8.6–10)
CALCIUM SERPL-MCNC: 8.7 MG/DL (ref 8.6–10)
CHLORIDE SERPL-SCNC: 104 MMOL/L (ref 98–107)
CHLORIDE SERPL-SCNC: 106 MMOL/L (ref 98–107)
CREAT SERPL-MCNC: 0.77 MG/DL (ref 0.51–0.95)
CREAT SERPL-MCNC: 0.8 MG/DL (ref 0.51–0.95)
DEPRECATED HCO3 PLAS-SCNC: 21 MMOL/L (ref 22–29)
DEPRECATED HCO3 PLAS-SCNC: 22 MMOL/L (ref 22–29)
EGFRCR SERPLBLD CKD-EPI 2021: >90 ML/MIN/1.73M2
EGFRCR SERPLBLD CKD-EPI 2021: >90 ML/MIN/1.73M2
EOSINOPHIL # BLD AUTO: 0 10E3/UL (ref 0–0.7)
EOSINOPHIL NFR BLD AUTO: 0 %
ERYTHROCYTE [DISTWIDTH] IN BLOOD BY AUTOMATED COUNT: 14.6 % (ref 10–15)
ERYTHROCYTE [DISTWIDTH] IN BLOOD BY AUTOMATED COUNT: 14.6 % (ref 10–15)
FERRITIN SERPL-MCNC: 56 NG/ML (ref 6–175)
GLUCOSE BLDC GLUCOMTR-MCNC: 79 MG/DL (ref 70–99)
GLUCOSE SERPL-MCNC: 84 MG/DL (ref 70–99)
GLUCOSE SERPL-MCNC: 89 MG/DL (ref 70–99)
HCT VFR BLD AUTO: 30.5 % (ref 35–47)
HCT VFR BLD AUTO: 31.9 % (ref 35–47)
HGB BLD-MCNC: 10.2 G/DL (ref 11.7–15.7)
HGB BLD-MCNC: 10.6 G/DL (ref 11.7–15.7)
IMM GRANULOCYTES # BLD: 0.2 10E3/UL
IMM GRANULOCYTES NFR BLD: 2 %
INR PPP: 0.95 (ref 0.85–1.15)
LYMPHOCYTES # BLD AUTO: 1.9 10E3/UL (ref 0.8–5.3)
LYMPHOCYTES NFR BLD AUTO: 15 %
MCH RBC QN AUTO: 29.9 PG (ref 26.5–33)
MCH RBC QN AUTO: 30.2 PG (ref 26.5–33)
MCHC RBC AUTO-ENTMCNC: 33.2 G/DL (ref 31.5–36.5)
MCHC RBC AUTO-ENTMCNC: 33.4 G/DL (ref 31.5–36.5)
MCV RBC AUTO: 90 FL (ref 78–100)
MCV RBC AUTO: 90 FL (ref 78–100)
MONOCYTES # BLD AUTO: 0.9 10E3/UL (ref 0–1.3)
MONOCYTES NFR BLD AUTO: 7 %
NEUTROPHILS # BLD AUTO: 10.2 10E3/UL (ref 1.6–8.3)
NEUTROPHILS NFR BLD AUTO: 76 %
NRBC # BLD AUTO: 0 10E3/UL
NRBC BLD AUTO-RTO: 0 /100
PLATELET # BLD AUTO: 181 10E3/UL (ref 150–450)
PLATELET # BLD AUTO: 183 10E3/UL (ref 150–450)
POTASSIUM SERPL-SCNC: 4.2 MMOL/L (ref 3.4–5.3)
POTASSIUM SERPL-SCNC: 4.4 MMOL/L (ref 3.4–5.3)
PROT SERPL-MCNC: 5.7 G/DL (ref 6.4–8.3)
PROT SERPL-MCNC: 6 G/DL (ref 6.4–8.3)
RBC # BLD AUTO: 3.38 10E6/UL (ref 3.8–5.2)
RBC # BLD AUTO: 3.54 10E6/UL (ref 3.8–5.2)
SODIUM SERPL-SCNC: 137 MMOL/L (ref 135–145)
SODIUM SERPL-SCNC: 138 MMOL/L (ref 135–145)
WBC # BLD AUTO: 12.2 10E3/UL (ref 4–11)
WBC # BLD AUTO: 13.3 10E3/UL (ref 4–11)

## 2023-10-02 PROCEDURE — 271N000001 HC OR GENERAL SUPPLY NON-STERILE: Performed by: STUDENT IN AN ORGANIZED HEALTH CARE EDUCATION/TRAINING PROGRAM

## 2023-10-02 PROCEDURE — 360N000076 HC SURGERY LEVEL 3, PER MIN: Performed by: STUDENT IN AN ORGANIZED HEALTH CARE EDUCATION/TRAINING PROGRAM

## 2023-10-02 PROCEDURE — 370N000017 HC ANESTHESIA TECHNICAL FEE, PER MIN: Performed by: STUDENT IN AN ORGANIZED HEALTH CARE EDUCATION/TRAINING PROGRAM

## 2023-10-02 PROCEDURE — 59025 FETAL NON-STRESS TEST: CPT | Mod: 26 | Performed by: OBSTETRICS & GYNECOLOGY

## 2023-10-02 PROCEDURE — 258N000003 HC RX IP 258 OP 636: Performed by: STUDENT IN AN ORGANIZED HEALTH CARE EDUCATION/TRAINING PROGRAM

## 2023-10-02 PROCEDURE — 250N000013 HC RX MED GY IP 250 OP 250 PS 637

## 2023-10-02 PROCEDURE — 59514 CESAREAN DELIVERY ONLY: CPT | Mod: GC | Performed by: STUDENT IN AN ORGANIZED HEALTH CARE EDUCATION/TRAINING PROGRAM

## 2023-10-02 PROCEDURE — 250N000011 HC RX IP 250 OP 636: Mod: JZ | Performed by: STUDENT IN AN ORGANIZED HEALTH CARE EDUCATION/TRAINING PROGRAM

## 2023-10-02 PROCEDURE — 710N000010 HC RECOVERY PHASE 1, LEVEL 2, PER MIN: Performed by: STUDENT IN AN ORGANIZED HEALTH CARE EDUCATION/TRAINING PROGRAM

## 2023-10-02 PROCEDURE — 120N000002 HC R&B MED SURG/OB UMMC

## 2023-10-02 PROCEDURE — 36415 COLL VENOUS BLD VENIPUNCTURE: CPT

## 2023-10-02 PROCEDURE — 250N000011 HC RX IP 250 OP 636: Mod: JZ

## 2023-10-02 PROCEDURE — 86901 BLOOD TYPING SEROLOGIC RH(D): CPT

## 2023-10-02 PROCEDURE — 250N000011 HC RX IP 250 OP 636

## 2023-10-02 PROCEDURE — 85027 COMPLETE CBC AUTOMATED: CPT

## 2023-10-02 PROCEDURE — 250N000009 HC RX 250

## 2023-10-02 PROCEDURE — 250N000009 HC RX 250: Performed by: STUDENT IN AN ORGANIZED HEALTH CARE EDUCATION/TRAINING PROGRAM

## 2023-10-02 PROCEDURE — 272N000001 HC OR GENERAL SUPPLY STERILE: Performed by: STUDENT IN AN ORGANIZED HEALTH CARE EDUCATION/TRAINING PROGRAM

## 2023-10-02 PROCEDURE — 258N000003 HC RX IP 258 OP 636

## 2023-10-02 PROCEDURE — 250N000011 HC RX IP 250 OP 636: Mod: JZ | Performed by: INTERNAL MEDICINE

## 2023-10-02 PROCEDURE — 85610 PROTHROMBIN TIME: CPT | Performed by: OBSTETRICS & GYNECOLOGY

## 2023-10-02 PROCEDURE — 85730 THROMBOPLASTIN TIME PARTIAL: CPT | Performed by: OBSTETRICS & GYNECOLOGY

## 2023-10-02 PROCEDURE — C9290 INJ, BUPIVACAINE LIPOSOME: HCPCS | Performed by: ANESTHESIOLOGY

## 2023-10-02 PROCEDURE — 99232 SBSQ HOSP IP/OBS MODERATE 35: CPT | Mod: 25 | Performed by: OBSTETRICS & GYNECOLOGY

## 2023-10-02 PROCEDURE — 80053 COMPREHEN METABOLIC PANEL: CPT

## 2023-10-02 PROCEDURE — 250N000011 HC RX IP 250 OP 636: Performed by: ANESTHESIOLOGY

## 2023-10-02 PROCEDURE — 85025 COMPLETE CBC W/AUTO DIFF WBC: CPT

## 2023-10-02 PROCEDURE — 999N000141 HC STATISTIC PRE-PROCEDURE NURSING ASSESSMENT: Performed by: STUDENT IN AN ORGANIZED HEALTH CARE EDUCATION/TRAINING PROGRAM

## 2023-10-02 PROCEDURE — 84155 ASSAY OF PROTEIN SERUM: CPT

## 2023-10-02 PROCEDURE — 82728 ASSAY OF FERRITIN: CPT | Performed by: OBSTETRICS & GYNECOLOGY

## 2023-10-02 PROCEDURE — 250N000011 HC RX IP 250 OP 636: Mod: JZ | Performed by: OBSTETRICS & GYNECOLOGY

## 2023-10-02 RX ORDER — MAGNESIUM SULFATE HEPTAHYDRATE 40 MG/ML
INJECTION, SOLUTION INTRAVENOUS
Status: DISCONTINUED
Start: 2023-10-02 | End: 2023-10-03 | Stop reason: HOSPADM

## 2023-10-02 RX ORDER — CITRIC ACID/SODIUM CITRATE 334-500MG
30 SOLUTION, ORAL ORAL
Status: COMPLETED | OUTPATIENT
Start: 2023-10-02 | End: 2023-10-02

## 2023-10-02 RX ORDER — SODIUM CHLORIDE, SODIUM LACTATE, POTASSIUM CHLORIDE, CALCIUM CHLORIDE 600; 310; 30; 20 MG/100ML; MG/100ML; MG/100ML; MG/100ML
INJECTION, SOLUTION INTRAVENOUS CONTINUOUS
Status: DISCONTINUED | OUTPATIENT
Start: 2023-10-02 | End: 2023-10-02 | Stop reason: HOSPADM

## 2023-10-02 RX ORDER — OXYCODONE HYDROCHLORIDE 5 MG/1
5 TABLET ORAL EVERY 4 HOURS PRN
Status: DISCONTINUED | OUTPATIENT
Start: 2023-10-02 | End: 2023-10-07 | Stop reason: HOSPADM

## 2023-10-02 RX ORDER — SODIUM CHLORIDE, SODIUM LACTATE, POTASSIUM CHLORIDE, CALCIUM CHLORIDE 600; 310; 30; 20 MG/100ML; MG/100ML; MG/100ML; MG/100ML
10-125 INJECTION, SOLUTION INTRAVENOUS CONTINUOUS
Status: DISCONTINUED | OUTPATIENT
Start: 2023-10-02 | End: 2023-10-03

## 2023-10-02 RX ORDER — CARBOPROST TROMETHAMINE 250 UG/ML
250 INJECTION, SOLUTION INTRAMUSCULAR
Status: DISCONTINUED | OUTPATIENT
Start: 2023-10-02 | End: 2023-10-02 | Stop reason: HOSPADM

## 2023-10-02 RX ORDER — BUPIVACAINE HYDROCHLORIDE 7.5 MG/ML
INJECTION, SOLUTION INTRASPINAL
Status: COMPLETED | OUTPATIENT
Start: 2023-10-02 | End: 2023-10-02

## 2023-10-02 RX ORDER — FENTANYL CITRATE-0.9 % NACL/PF 10 MCG/ML
100 PLASTIC BAG, INJECTION (ML) INTRAVENOUS EVERY 5 MIN PRN
Status: DISCONTINUED | OUTPATIENT
Start: 2023-10-02 | End: 2023-10-02 | Stop reason: HOSPADM

## 2023-10-02 RX ORDER — NALOXONE HYDROCHLORIDE 0.4 MG/ML
0.2 INJECTION, SOLUTION INTRAMUSCULAR; INTRAVENOUS; SUBCUTANEOUS
Status: DISCONTINUED | OUTPATIENT
Start: 2023-10-02 | End: 2023-10-03

## 2023-10-02 RX ORDER — ONDANSETRON 4 MG/1
4 TABLET, ORALLY DISINTEGRATING ORAL EVERY 6 HOURS PRN
Status: DISCONTINUED | OUTPATIENT
Start: 2023-10-02 | End: 2023-10-07 | Stop reason: HOSPADM

## 2023-10-02 RX ORDER — BUPIVACAINE HYDROCHLORIDE 2.5 MG/ML
INJECTION, SOLUTION EPIDURAL; INFILTRATION; INTRACAUDAL
Status: COMPLETED | OUTPATIENT
Start: 2023-10-02 | End: 2023-10-02

## 2023-10-02 RX ORDER — MISOPROSTOL 200 UG/1
400 TABLET ORAL
Status: DISCONTINUED | OUTPATIENT
Start: 2023-10-02 | End: 2023-10-02 | Stop reason: HOSPADM

## 2023-10-02 RX ORDER — OXYTOCIN 10 [USP'U]/ML
10 INJECTION, SOLUTION INTRAMUSCULAR; INTRAVENOUS
Status: DISCONTINUED | OUTPATIENT
Start: 2023-10-02 | End: 2023-10-02 | Stop reason: HOSPADM

## 2023-10-02 RX ORDER — SIMETHICONE 80 MG
80 TABLET,CHEWABLE ORAL 4 TIMES DAILY PRN
Status: DISCONTINUED | OUTPATIENT
Start: 2023-10-02 | End: 2023-10-07 | Stop reason: HOSPADM

## 2023-10-02 RX ORDER — AMOXICILLIN 250 MG
2 CAPSULE ORAL 2 TIMES DAILY
Status: DISCONTINUED | OUTPATIENT
Start: 2023-10-03 | End: 2023-10-07 | Stop reason: HOSPADM

## 2023-10-02 RX ORDER — KETOROLAC TROMETHAMINE 30 MG/ML
INJECTION, SOLUTION INTRAMUSCULAR; INTRAVENOUS
Status: DISCONTINUED
Start: 2023-10-02 | End: 2023-10-03 | Stop reason: HOSPADM

## 2023-10-02 RX ORDER — OXYTOCIN 10 [USP'U]/ML
10 INJECTION, SOLUTION INTRAMUSCULAR; INTRAVENOUS
Status: DISCONTINUED | OUTPATIENT
Start: 2023-10-02 | End: 2023-10-07 | Stop reason: HOSPADM

## 2023-10-02 RX ORDER — LABETALOL 200 MG/1
400 TABLET, FILM COATED ORAL EVERY 8 HOURS SCHEDULED
Status: DISCONTINUED | OUTPATIENT
Start: 2023-10-02 | End: 2023-10-03

## 2023-10-02 RX ORDER — CEFAZOLIN SODIUM 2 G/100ML
INJECTION, SOLUTION INTRAVENOUS
Status: COMPLETED
Start: 2023-10-02 | End: 2023-10-02

## 2023-10-02 RX ORDER — METHYLERGONOVINE MALEATE 0.2 MG/ML
200 INJECTION INTRAVENOUS
Status: DISCONTINUED | OUTPATIENT
Start: 2023-10-02 | End: 2023-10-02 | Stop reason: HOSPADM

## 2023-10-02 RX ORDER — LIDOCAINE 40 MG/G
CREAM TOPICAL
Status: DISCONTINUED | OUTPATIENT
Start: 2023-10-02 | End: 2023-10-02

## 2023-10-02 RX ORDER — METHYLERGONOVINE MALEATE 0.2 MG/ML
200 INJECTION INTRAVENOUS
Status: DISCONTINUED | OUTPATIENT
Start: 2023-10-02 | End: 2023-10-07 | Stop reason: HOSPADM

## 2023-10-02 RX ORDER — MODIFIED LANOLIN
OINTMENT (GRAM) TOPICAL
Status: DISCONTINUED | OUTPATIENT
Start: 2023-10-02 | End: 2023-10-07 | Stop reason: HOSPADM

## 2023-10-02 RX ORDER — LIDOCAINE 40 MG/G
CREAM TOPICAL
Status: DISCONTINUED | OUTPATIENT
Start: 2023-10-02 | End: 2023-10-02 | Stop reason: HOSPADM

## 2023-10-02 RX ORDER — ACETAMINOPHEN 325 MG/1
975 TABLET ORAL EVERY 6 HOURS
Status: DISCONTINUED | OUTPATIENT
Start: 2023-10-04 | End: 2023-10-03

## 2023-10-02 RX ORDER — MAGNESIUM SULFATE HEPTAHYDRATE 40 MG/ML
4 INJECTION, SOLUTION INTRAVENOUS ONCE
Status: COMPLETED | OUTPATIENT
Start: 2023-10-02 | End: 2023-10-02

## 2023-10-02 RX ORDER — OXYTOCIN/0.9 % SODIUM CHLORIDE 30/500 ML
340 PLASTIC BAG, INJECTION (ML) INTRAVENOUS CONTINUOUS PRN
Status: DISCONTINUED | OUTPATIENT
Start: 2023-10-02 | End: 2023-10-07 | Stop reason: HOSPADM

## 2023-10-02 RX ORDER — KETOROLAC TROMETHAMINE 30 MG/ML
30 INJECTION, SOLUTION INTRAMUSCULAR; INTRAVENOUS EVERY 6 HOURS
Status: COMPLETED | OUTPATIENT
Start: 2023-10-03 | End: 2023-10-03

## 2023-10-02 RX ORDER — AMOXICILLIN 250 MG
1 CAPSULE ORAL 2 TIMES DAILY
Status: DISCONTINUED | OUTPATIENT
Start: 2023-10-03 | End: 2023-10-07 | Stop reason: HOSPADM

## 2023-10-02 RX ORDER — MAGNESIUM SULFATE IN WATER 40 MG/ML
INJECTION, SOLUTION INTRAVENOUS
Status: DISCONTINUED
Start: 2023-10-02 | End: 2023-10-03 | Stop reason: HOSPADM

## 2023-10-02 RX ORDER — MISOPROSTOL 200 UG/1
800 TABLET ORAL
Status: DISCONTINUED | OUTPATIENT
Start: 2023-10-02 | End: 2023-10-02 | Stop reason: HOSPADM

## 2023-10-02 RX ORDER — CEFAZOLIN SODIUM 2 G/100ML
2 INJECTION, SOLUTION INTRAVENOUS
Status: DISCONTINUED | OUTPATIENT
Start: 2023-10-02 | End: 2023-10-02 | Stop reason: HOSPADM

## 2023-10-02 RX ORDER — OXYTOCIN/0.9 % SODIUM CHLORIDE 30/500 ML
340 PLASTIC BAG, INJECTION (ML) INTRAVENOUS CONTINUOUS PRN
Status: COMPLETED | OUTPATIENT
Start: 2023-10-02 | End: 2023-10-02

## 2023-10-02 RX ORDER — IBUPROFEN 800 MG/1
800 TABLET, FILM COATED ORAL EVERY 6 HOURS
Status: DISCONTINUED | OUTPATIENT
Start: 2023-10-03 | End: 2023-10-07 | Stop reason: HOSPADM

## 2023-10-02 RX ORDER — METOCLOPRAMIDE 10 MG/1
10 TABLET ORAL EVERY 6 HOURS PRN
Status: DISCONTINUED | OUTPATIENT
Start: 2023-10-02 | End: 2023-10-07 | Stop reason: HOSPADM

## 2023-10-02 RX ORDER — ALBUTEROL SULFATE 90 UG/1
2 AEROSOL, METERED RESPIRATORY (INHALATION) DAILY
Status: DISCONTINUED | OUTPATIENT
Start: 2023-10-02 | End: 2023-10-03

## 2023-10-02 RX ORDER — CITRIC ACID/SODIUM CITRATE 334-500MG
30 SOLUTION, ORAL ORAL ONCE
Status: DISCONTINUED | OUTPATIENT
Start: 2023-10-02 | End: 2023-10-02 | Stop reason: HOSPADM

## 2023-10-02 RX ORDER — NALOXONE HYDROCHLORIDE 0.4 MG/ML
0.4 INJECTION, SOLUTION INTRAMUSCULAR; INTRAVENOUS; SUBCUTANEOUS
Status: DISCONTINUED | OUTPATIENT
Start: 2023-10-02 | End: 2023-10-03

## 2023-10-02 RX ORDER — PROCHLORPERAZINE MALEATE 10 MG
10 TABLET ORAL EVERY 6 HOURS PRN
Status: DISCONTINUED | OUTPATIENT
Start: 2023-10-02 | End: 2023-10-07 | Stop reason: HOSPADM

## 2023-10-02 RX ORDER — DEXTROSE, SODIUM CHLORIDE, SODIUM LACTATE, POTASSIUM CHLORIDE, AND CALCIUM CHLORIDE 5; .6; .31; .03; .02 G/100ML; G/100ML; G/100ML; G/100ML; G/100ML
INJECTION, SOLUTION INTRAVENOUS CONTINUOUS
Status: DISCONTINUED | OUTPATIENT
Start: 2023-10-03 | End: 2023-10-07 | Stop reason: HOSPADM

## 2023-10-02 RX ORDER — MISOPROSTOL 200 UG/1
800 TABLET ORAL
Status: DISCONTINUED | OUTPATIENT
Start: 2023-10-02 | End: 2023-10-07 | Stop reason: HOSPADM

## 2023-10-02 RX ORDER — FENTANYL CITRATE 50 UG/ML
INJECTION, SOLUTION INTRAMUSCULAR; INTRAVENOUS
Status: COMPLETED | OUTPATIENT
Start: 2023-10-02 | End: 2023-10-02

## 2023-10-02 RX ORDER — SODIUM CHLORIDE, SODIUM LACTATE, POTASSIUM CHLORIDE, CALCIUM CHLORIDE 600; 310; 30; 20 MG/100ML; MG/100ML; MG/100ML; MG/100ML
INJECTION, SOLUTION INTRAVENOUS CONTINUOUS PRN
Status: DISCONTINUED | OUTPATIENT
Start: 2023-10-02 | End: 2023-10-02

## 2023-10-02 RX ORDER — ENOXAPARIN SODIUM 100 MG/ML
40 INJECTION SUBCUTANEOUS EVERY 24 HOURS
Status: DISCONTINUED | OUTPATIENT
Start: 2023-10-03 | End: 2023-10-07 | Stop reason: HOSPADM

## 2023-10-02 RX ORDER — HYDROCORTISONE 25 MG/G
CREAM TOPICAL 3 TIMES DAILY PRN
Status: DISCONTINUED | OUTPATIENT
Start: 2023-10-02 | End: 2023-10-07 | Stop reason: HOSPADM

## 2023-10-02 RX ORDER — CARBOPROST TROMETHAMINE 250 UG/ML
250 INJECTION, SOLUTION INTRAMUSCULAR
Status: DISCONTINUED | OUTPATIENT
Start: 2023-10-02 | End: 2023-10-07 | Stop reason: HOSPADM

## 2023-10-02 RX ORDER — TRANEXAMIC ACID 10 MG/ML
1 INJECTION, SOLUTION INTRAVENOUS EVERY 30 MIN PRN
Status: DISCONTINUED | OUTPATIENT
Start: 2023-10-02 | End: 2023-10-07 | Stop reason: HOSPADM

## 2023-10-02 RX ORDER — MAGNESIUM SULFATE IN WATER 40 MG/ML
1 INJECTION, SOLUTION INTRAVENOUS CONTINUOUS
Status: ACTIVE | OUTPATIENT
Start: 2023-10-02 | End: 2023-10-03

## 2023-10-02 RX ORDER — NALBUPHINE HYDROCHLORIDE 10 MG/ML
2.5-5 INJECTION, SOLUTION INTRAMUSCULAR; INTRAVENOUS; SUBCUTANEOUS EVERY 6 HOURS PRN
Status: DISCONTINUED | OUTPATIENT
Start: 2023-10-02 | End: 2023-10-03

## 2023-10-02 RX ORDER — MISOPROSTOL 200 UG/1
400 TABLET ORAL
Status: DISCONTINUED | OUTPATIENT
Start: 2023-10-02 | End: 2023-10-07 | Stop reason: HOSPADM

## 2023-10-02 RX ORDER — CEFAZOLIN SODIUM 2 G/100ML
2 INJECTION, SOLUTION INTRAVENOUS SEE ADMIN INSTRUCTIONS
Status: DISCONTINUED | OUTPATIENT
Start: 2023-10-02 | End: 2023-10-02 | Stop reason: HOSPADM

## 2023-10-02 RX ORDER — ONDANSETRON 2 MG/ML
INJECTION INTRAMUSCULAR; INTRAVENOUS PRN
Status: DISCONTINUED | OUTPATIENT
Start: 2023-10-02 | End: 2023-10-02

## 2023-10-02 RX ORDER — ACETAMINOPHEN 325 MG/1
975 TABLET ORAL ONCE
Status: COMPLETED | OUTPATIENT
Start: 2023-10-02 | End: 2023-10-02

## 2023-10-02 RX ORDER — METOCLOPRAMIDE HYDROCHLORIDE 5 MG/ML
10 INJECTION INTRAMUSCULAR; INTRAVENOUS EVERY 6 HOURS PRN
Status: DISCONTINUED | OUTPATIENT
Start: 2023-10-02 | End: 2023-10-07 | Stop reason: HOSPADM

## 2023-10-02 RX ORDER — OXYTOCIN/0.9 % SODIUM CHLORIDE 30/500 ML
100-340 PLASTIC BAG, INJECTION (ML) INTRAVENOUS CONTINUOUS PRN
Status: DISCONTINUED | OUTPATIENT
Start: 2023-10-02 | End: 2023-10-07 | Stop reason: HOSPADM

## 2023-10-02 RX ORDER — MORPHINE SULFATE 1 MG/ML
INJECTION, SOLUTION EPIDURAL; INTRATHECAL; INTRAVENOUS
Status: COMPLETED | OUTPATIENT
Start: 2023-10-02 | End: 2023-10-02

## 2023-10-02 RX ORDER — PROCHLORPERAZINE 25 MG
25 SUPPOSITORY, RECTAL RECTAL EVERY 12 HOURS PRN
Status: DISCONTINUED | OUTPATIENT
Start: 2023-10-02 | End: 2023-10-07 | Stop reason: HOSPADM

## 2023-10-02 RX ORDER — TRANEXAMIC ACID 10 MG/ML
1 INJECTION, SOLUTION INTRAVENOUS EVERY 30 MIN PRN
Status: DISCONTINUED | OUTPATIENT
Start: 2023-10-02 | End: 2023-10-02 | Stop reason: HOSPADM

## 2023-10-02 RX ORDER — ONDANSETRON 2 MG/ML
4 INJECTION INTRAMUSCULAR; INTRAVENOUS EVERY 6 HOURS PRN
Status: DISCONTINUED | OUTPATIENT
Start: 2023-10-02 | End: 2023-10-07 | Stop reason: HOSPADM

## 2023-10-02 RX ORDER — BISACODYL 10 MG
10 SUPPOSITORY, RECTAL RECTAL DAILY PRN
Status: DISCONTINUED | OUTPATIENT
Start: 2023-10-04 | End: 2023-10-07 | Stop reason: HOSPADM

## 2023-10-02 RX ADMIN — BUPIVACAINE HYDROCHLORIDE 20 ML: 2.5 INJECTION, SOLUTION EPIDURAL; INFILTRATION; INTRACAUDAL at 21:37

## 2023-10-02 RX ADMIN — LABETALOL HYDROCHLORIDE 300 MG: 300 TABLET, FILM COATED ORAL at 05:51

## 2023-10-02 RX ADMIN — MAGNESIUM SULFATE HEPTAHYDRATE 2 G/HR: 40 INJECTION, SOLUTION INTRAVENOUS at 19:27

## 2023-10-02 RX ADMIN — ALBUTEROL SULFATE 1 PUFF: 90 INHALANT RESPIRATORY (INHALATION) at 08:36

## 2023-10-02 RX ADMIN — MAGNESIUM SULFATE HEPTAHYDRATE 4 G: 40 INJECTION, SOLUTION INTRAVENOUS at 18:12

## 2023-10-02 RX ADMIN — LABETALOL HYDROCHLORIDE 20 MG: 5 INJECTION, SOLUTION INTRAVENOUS at 13:04

## 2023-10-02 RX ADMIN — SODIUM CHLORIDE, POTASSIUM CHLORIDE, SODIUM LACTATE AND CALCIUM CHLORIDE 25 ML/HR: 600; 310; 30; 20 INJECTION, SOLUTION INTRAVENOUS at 18:15

## 2023-10-02 RX ADMIN — PHENYLEPHRINE HYDROCHLORIDE 100 MCG: 10 INJECTION INTRAVENOUS at 21:04

## 2023-10-02 RX ADMIN — FLUTICASONE PROPIONATE 1 PUFF: 110 AEROSOL, METERED RESPIRATORY (INHALATION) at 08:35

## 2023-10-02 RX ADMIN — Medication 2 G: at 20:15

## 2023-10-02 RX ADMIN — SODIUM CITRATE AND CITRIC ACID MONOHYDRATE 30 ML: 500; 334 SOLUTION ORAL at 19:52

## 2023-10-02 RX ADMIN — CALCIUM CARBONATE (ANTACID) CHEW TAB 500 MG 1000 MG: 500 CHEW TAB at 13:56

## 2023-10-02 RX ADMIN — FENTANYL CITRATE 15 MCG: 50 INJECTION, SOLUTION INTRAMUSCULAR; INTRAVENOUS at 20:14

## 2023-10-02 RX ADMIN — PHENYLEPHRINE HYDROCHLORIDE 100 MCG: 10 INJECTION INTRAVENOUS at 21:00

## 2023-10-02 RX ADMIN — PHENYLEPHRINE HYDROCHLORIDE 100 MCG: 10 INJECTION INTRAVENOUS at 20:55

## 2023-10-02 RX ADMIN — HYDRALAZINE HYDROCHLORIDE 10 MG: 20 INJECTION INTRAMUSCULAR; INTRAVENOUS at 17:56

## 2023-10-02 RX ADMIN — MORPHINE SULFATE 0.15 MG: 1 INJECTION EPIDURAL; INTRATHECAL; INTRAVENOUS at 20:14

## 2023-10-02 RX ADMIN — ALBUTEROL SULFATE 2 PUFF: 90 INHALANT RESPIRATORY (INHALATION) at 17:04

## 2023-10-02 RX ADMIN — SODIUM CHLORIDE, POTASSIUM CHLORIDE, SODIUM LACTATE AND CALCIUM CHLORIDE: 600; 310; 30; 20 INJECTION, SOLUTION INTRAVENOUS at 20:00

## 2023-10-02 RX ADMIN — NIFEDIPINE 60 MG: 30 TABLET, FILM COATED, EXTENDED RELEASE ORAL at 08:31

## 2023-10-02 RX ADMIN — BUPIVACAINE 20 ML: 13.3 INJECTION, SUSPENSION, LIPOSOMAL INFILTRATION at 21:37

## 2023-10-02 RX ADMIN — LABETALOL HYDROCHLORIDE 400 MG: 200 TABLET, FILM COATED ORAL at 13:56

## 2023-10-02 RX ADMIN — LABETALOL HYDROCHLORIDE 80 MG: 5 INJECTION, SOLUTION INTRAVENOUS at 13:50

## 2023-10-02 RX ADMIN — ONDANSETRON 4 MG: 2 INJECTION INTRAMUSCULAR; INTRAVENOUS at 20:15

## 2023-10-02 RX ADMIN — BUPIVACAINE HYDROCHLORIDE IN DEXTROSE 1.6 ML: 7.5 INJECTION, SOLUTION SUBARACHNOID at 20:14

## 2023-10-02 RX ADMIN — MAGNESIUM SULFATE HEPTAHYDRATE 2 G: 40 INJECTION, SOLUTION INTRAVENOUS at 18:47

## 2023-10-02 RX ADMIN — Medication 300 ML/HR: at 20:42

## 2023-10-02 RX ADMIN — HYDRALAZINE HYDROCHLORIDE 10 MG: 20 INJECTION INTRAMUSCULAR; INTRAVENOUS at 14:36

## 2023-10-02 RX ADMIN — ACETAMINOPHEN 975 MG: 325 TABLET, FILM COATED ORAL at 19:52

## 2023-10-02 RX ADMIN — PHENYLEPHRINE HYDROCHLORIDE 50 MCG/MIN: 10 INJECTION INTRAVENOUS at 20:12

## 2023-10-02 RX ADMIN — LABETALOL HYDROCHLORIDE 40 MG: 5 INJECTION, SOLUTION INTRAVENOUS at 13:23

## 2023-10-02 RX ADMIN — TRANEXAMIC ACID 1 G: 1 INJECTION, SOLUTION INTRAVENOUS at 20:46

## 2023-10-02 RX ADMIN — CALCIUM CARBONATE (ANTACID) CHEW TAB 500 MG 1000 MG: 500 CHEW TAB at 08:31

## 2023-10-02 ASSESSMENT — ACTIVITIES OF DAILY LIVING (ADL)
ADLS_ACUITY_SCORE: 22

## 2023-10-02 NOTE — DISCHARGE SUMMARY
[facility] Discharge Summary    Selena Dutton MRN# [id number]   Age: 33 year old YOB: 1990     Date of Admission:  2023  Date of Discharge:  10/07/23  Admitting Physician:  Sarthak Morales MD  Discharge Physician:  Natasha Mitchell MD PhD             Admission Diagnoses:     IUP at 29w5d  PreE w/ SF  AG metabolic acidosis, suspect starvation ketosis  FGR  MDD/JAQUELIN  Asthma          Discharge Diagnosis:   Same as above, now delivered         Procedures:   Primary low segment transverse  section via Pfannenstiel skin incision with partial double layer uterine closure   Spinal         Medications Prior to Admission:     Medications Prior to Admission   Medication Sig Dispense Refill Last Dose    albuterol (PROAIR HFA/PROVENTIL HFA/VENTOLIN HFA) 108 (90 Base) MCG/ACT inhaler Inhale 2 puffs into the lungs every 6 hours 18 g 3     fluticasone (FLOVENT HFA) 110 MCG/ACT inhaler Inhale 1 puff into the lungs 2 times daily 12 g 0     montelukast (SINGULAIR) 10 MG tablet Take 1 tablet (10 mg) by mouth At Bedtime 90 tablet 3     Prenat w/o J-CU-Cfrqiqh-FA-DHA (PNV-DHA PO) Take 1 tablet by mouth daily       sertraline (ZOLOFT) 100 MG tablet Take 1 tablet (100 mg) by mouth daily 90 tablet 3               Discharge Medications:        Review of your medicines        START taking        Dose / Directions   acetaminophen 325 MG tablet  Commonly known as: TYLENOL  Used for: S/P  section      Dose: 650 mg  Take 2 tablets (650 mg) by mouth every 6 hours as needed for mild pain Start after Delivery.  Quantity: 100 tablet  Refills: 0     enalapril 5 MG tablet  Commonly known as: VASOTEC  Used for: Severe hypertension affecting pregnancy in third trimester, S/P  section      Dose: 5 mg  Take 1 tablet (5 mg) by mouth 2 times daily  Quantity: 180 tablet  Refills: 0     ferrous sulfate 325 (65 Fe) MG EC tablet  Commonly known as: FE TABS  Used for: S/P  section      Dose: 325  mg  Take 1 tablet (325 mg) by mouth every other day  Quantity: 90 tablet  Refills: 1     hydrALAZINE 10 MG tablet  Commonly known as: APRESOLINE  Used for: Severe hypertension affecting pregnancy in third trimester, S/P  section      Dose: 20 mg  Take 2 tablets (20 mg) by mouth 4 times daily for 60 days  Quantity: 480 tablet  Refills: 0     hydrochlorothiazide 25 MG tablet  Commonly known as: HYDRODIURIL  Used for: Severe hypertension affecting pregnancy in third trimester, S/P  section      Dose: 25 mg  Take 1 tablet (25 mg) by mouth daily  Quantity: 60 tablet  Refills: 0     ibuprofen 600 MG tablet  Commonly known as: ADVIL/MOTRIN  Used for: S/P  section      Dose: 600 mg  Take 1 tablet (600 mg) by mouth every 6 hours as needed for moderate pain Start after delivery  Quantity: 60 tablet  Refills: 0     labetalol 200 MG tablet  Commonly known as: NORMODYNE  Used for: Severe hypertension affecting pregnancy in third trimester, S/P  section      Dose: 800 mg  Take 4 tablets (800 mg) by mouth every 8 hours  Quantity: 720 tablet  Refills: 0     NIFEdipine ER 60 MG 24 hr tablet  Commonly known as: ADALAT CC  Used for: Severe hypertension affecting pregnancy in third trimester, S/P  section      Dose: 60 mg  Take 1 tablet (60 mg) by mouth 2 times daily  Quantity: 120 tablet  Refills: 0     senna-docusate 8.6-50 MG tablet  Commonly known as: SENOKOT-S/PERICOLACE  Used for: S/P  section      Dose: 1 tablet  Take 1 tablet by mouth daily Start after delivery.  Quantity: 100 tablet  Refills: 0            CONTINUE these medicines which have NOT CHANGED        Dose / Directions   albuterol 108 (90 Base) MCG/ACT inhaler  Commonly known as: PROAIR HFA/PROVENTIL HFA/VENTOLIN HFA      Dose: 2 puff  Inhale 2 puffs into the lungs every 6 hours  Quantity: 18 g  Refills: 3     fluticasone 110 MCG/ACT inhaler  Commonly known as: FLOVENT HFA      Dose: 1 puff  Inhale 1 puff into the lungs 2  times daily  Quantity: 12 g  Refills: 0     montelukast 10 MG tablet  Commonly known as: SINGULAIR  Used for: Moderate persistent asthma without complication      Dose: 10 mg  Take 1 tablet (10 mg) by mouth At Bedtime  Quantity: 90 tablet  Refills: 3     PNV-DHA PO      Dose: 1 tablet  Take 1 tablet by mouth daily  Refills: 0     sertraline 100 MG tablet  Commonly known as: ZOLOFT  Used for: Anxiety and depression      Dose: 100 mg  Take 1 tablet (100 mg) by mouth daily  Quantity: 90 tablet  Refills: 3               Where to get your medicines        These medications were sent to [facility] [place], [place] - [address]  [address] [address]      Phone: [phone]   acetaminophen 325 MG tablet  enalapril 5 MG tablet  ferrous sulfate 325 (65 Fe) MG EC tablet  hydrALAZINE 10 MG tablet  hydrochlorothiazide 25 MG tablet  ibuprofen 600 MG tablet  labetalol 200 MG tablet  NIFEdipine ER 60 MG 24 hr tablet  senna-docusate 8.6-50 MG tablet               Consultations:   MICU  Anesthesia  NICU          Brief History of Admission and Antepartum Course:   Selena Dutton is a 32 year old  at 29w4d by LMP c/w 9w3d US admitted to the ICU as a transfer of care from [facility] in the setting of new diagnosis of pre-eclampsia  with severe features by blood pressure criteria and proteinuria also complicated by acidosis suspected due to starvation ketosis requiring management via nicardipine gtt. At the OSH, she was started on magnesium prophylaxis and a betamethasone course. Magnesium infusion was continued until blood pressures were better controlled. On HD#2, she was no longer requiring nicardipine gtt and blood pressures were managed with PO medications. She was therefore transferred to the antepartum unit. Magnesium infusion was stopped and fetal monitoring was transitioned to TID. Given FGR, the patient received weekly BPP w/ UAD and serially growth US.          Intrapartum Course:   Selena Dutton is a 33 year old, , who was admitted at 29w3d by LMP c/w 9w3d ultrasound for preeclampsia with severe features requiring ICU level care for nicardipine infusion and magnesium prophylaxis. On HD#2, following stabilization of blood pressures and discontinuation of nicardipine and magnesium infusions, she was transferred to the antepartum unit. On HD#3, she was again requiring multiple IV antihypertensives for sustained severe range blood pressures. Given refractory severe range blood pressures following betamethasone course, delivery recommended. The risks, benefits, and alternatives of  delivery were previously explained and the patient agreed to proceed.     Procedure Findings:   1. Single, viable male infant at 2041 hours on 10/2/2023. Apgars pending.  Birth weight: 1100 g.  Fetal presentation: vertex. Amniotic fluid: clear.    2. Cord gases pending.  3. Placenta intact with 3 vessel cord. Focal area of placenta adherent to posterior aspect of endometrium resolved with gentle traction. Bleeding appropriate and tone improved with closure of hysterotomy and replacement of uterus in situ.  4. Normal appearing uterus, fallopian tubes, ovaries.   5.  No intraabdominal adhesions.  No abdominal wall adhesions.  6. During episode of emesis, large and small bowel outside of abdominal cavity at the time of imbrication of the hysterotomy. Suture needle exposed although tucked near the uterine serosa making injury to surrounding structures unlikely. No signs of injury when bowel returned to abdomen after correction of nausea and vomiting.      EBL was 1342 mL. See operative note for details.             Postpartum Hospital Course:   The patient's postpartum course was notable for persistently elevated blood pressures that were initially difficult to control. After multiples days of increasing doses and additional agents, her blood pressures were controlled on 60 mg of nifedipine  XL BID, labetalol 800 mg TID, hydralazine 20 mg QID, HCTZ 25 mg daily and Enalapril 5 mg BID. On discharge, her pain was well controlled. Vaginal bleeding is similar to peak menstrual flow.  Voiding without difficulty.  Ambulating well and tolerating a normal diet.  No fever.  Pumping with good milk production for infant in NICU.  Infant is stable.  She was discharged on post-partum day #5.    Post-partum hemoglobin: 9.5    Contraception: undecided, reviewed need to avoid pregnancy given enalapril.  Previously on combined oral contraceptive medication.  Discouraged use of estrogen containing products in the future. Encouraged 18-24 months interval before considering future pregnancy given  Delivery.    She would like to follow up with her primary OB provider     Rhogam was not indicated.          Discharge Instructions and Follow-Up:     Discharge diet: Regular   Discharge activity: No lifting, driving, or strenuous exercise for 6 week(s)   Discharge follow-up: Follow up with primary Ob provider in 2-3 days for BP check and 6 weeks for routine PP visit.    Discussed recommendations to find a PCP who can help with managing long term cardivascular health and risk in the future.      Wound care: Steri-strips off in 7 days             Discharge Disposition:   Discharged to home      Natasha Mitchell MD PhD  Department of Obstetrics, Gynecology and Women's Health  Maternal Fetal Medicine Division

## 2023-10-02 NOTE — PROVIDER NOTIFICATION
10/02/23 1656 10/02/23 1713   Vital Signs   Oximeter Heart Rate 75 bpm 78 bpm   BP (!) 164/95 (!) 161/95   BP Location Left arm Left arm   Patient Position Semi-Mccarty's Semi-Mccarty's   Cuff Size Adult Regular Adult Regular     Provider notified and at bedside. Recommend C/S. Pt agrees.

## 2023-10-02 NOTE — PROVIDER NOTIFICATION
10/02/23 0850   Provider Notification   Provider Name/Title Dr. Omalley, Dr. Ingram, Dr. Boyle, Med student   Method of Notification At Bedside   Request Evaluate in Person   Notification Reason Status Update     Providers here for morning rounds, discuss Pre-E w/SF and medication therapy (nifedipine & labetalol), EFM strip review - providers want EFM back on for now due to fluctuating baseline. Plan for twice weekly dopplers (Tues/Fri). Patient's questions answered. Patient agrees with plan of care.

## 2023-10-02 NOTE — PLAN OF CARE
Pre-Eclampsia Shift Note  Data:  Vitals:    10/01/23 1945 10/01/23 2212 10/02/23 0212 10/02/23 0551   BP: (!) 153/89 (!) 156/89 (!) 148/84 (!) 142/85   BP Location: Left arm Left arm Left arm Left arm   Patient Position:   Supine Supine   Cuff Size:       Pulse:    82   Resp: 15 16 15 14   Temp: 98.9  F (37.2  C) 98.5  F (36.9  C) 98.8  F (37.1  C) 98.4  F (36.9  C)   TempSrc: Oral Oral Oral Oral   SpO2:       Weight:    72.5 kg (159 lb 12.8 oz)   .   Vitals:    09/30/23 1540 10/02/23 0551   Weight: 75.2 kg (165 lb 12.6 oz) 72.5 kg (159 lb 12.8 oz)   .   I/O this shift:  In: 300 [P.O.:300]  Out: 1300 [Urine:1300]. Signs and symptoms of pre-eclampsia denied but patient has elevated blood pressures. Fetal assessment Reactive see flow sheets for details. Pre-eclampsia lab values improving, see results tab. Interventions: Monitor blood pressures and indicators of pre-eclampsia every 4 hours and with medication administration. Continue uterine/fetal assessment 3 times daily. Activity level Regular activity. Patient denies, vaginally bleeding, leaking of fluid, contractions. Tolerating new blood pressure medications well per patient report. Plan: Continue expectant management. Observe for and notify care provider of indicators of worsening pre-eclampsia or signs of fetal/maternal compromise.

## 2023-10-02 NOTE — PROGRESS NOTES
Maternal Fetal Medicine Progress Note    S: Patient feeling much improved this morning. States that since she has been off of the Magnesium she feels like a new person. Denies headaches, visual changes, RUQ abdominal pain. She clarifies that she has never had a headache while being hospitalized, but that she had a headache prior to hospitalization.     Vitals:    10/01/23 1945 10/01/23 2212 10/02/23 0212 10/02/23 0551   BP: (!) 153/89 (!) 156/89 (!) 148/84 (!) 142/85   BP Location: Left arm Left arm Left arm    Patient Position:   Supine    Cuff Size:       Pulse:    82   Resp: 15 16 15    Temp: 98.9  F (37.2  C) 98.5  F (36.9  C) 98.8  F (37.1  C)    TempSrc: Oral Oral Oral    SpO2:       Weight:         General Appearance: No acute distress. Awake, alert and oriented.   Abdomen: Gravid.   Pelvic: Deferred    Previous tracing from early this morning reviewed, possible prolonged decel w/ tiny to 125 last ~3-4 minutes though difficult to determine baseline. Placed back on the monitor with tracing as below     FHT: baseline 140 bpm, moderate variability, accels present (10 by 10), no decels.   [place]: quiet     Labs:    Latest Reference Range & Units 10/01/23 03:22 10/01/23 18:44 10/02/23 08:33   WBC 4.0 - 11.0 10e3/uL 18.7 (H) 15.0 (H) 13.3 (H)   Hemoglobin 11.7 - 15.7 g/dL 10.9 (L) 10.1 (L) 10.6 (L)   Hematocrit 35.0 - 47.0 % 32.0 (L) 29.7 (L) 31.9 (L)   Platelet Count 150 - 450 10e3/uL 208 182 183   RBC Count 3.80 - 5.20 10e6/uL 3.64 (L) 3.28 (L) 3.54 (L)   MCV 78 - 100 fL 88 91 90   MCH 26.5 - 33.0 pg 29.9 30.8 29.9   MCHC 31.5 - 36.5 g/dL 34.1 34.0 33.2   RDW 10.0 - 15.0 % 14.3 14.7 14.6      Latest Reference Range & Units 10/01/23 18:44 10/02/23 08:33   Sodium 135 - 145 mmol/L 133 (L) 138   Potassium 3.4 - 5.3 mmol/L 4.1 4.2   Chloride 98 - 107 mmol/L 102 106   Carbon Dioxide (CO2) 22 - 29 mmol/L 20 (L) 21 (L)   Urea Nitrogen 6.0 - 20.0 mg/dL 10.0 14.2   Creatinine 0.51 - 0.95 mg/dL 0.79 0.77   GFR Estimate >60  mL/min/1.73m2 >90 >90   Calcium 8.6 - 10.0 mg/dL 7.2 (L) 8.0 (L)   Anion Gap 7 - 15 mmol/L 11 11   Albumin 3.5 - 5.2 g/dL 3.2 (L) 3.3 (L)   Protein Total 6.4 - 8.3 g/dL 5.7 (L) 6.0 (L)   Alkaline Phosphatase 35 - 104 U/L 113 (H) 117 (H)   ALT 0 - 50 U/L 27 30   AST 0 - 45 U/L 30 34   Bilirubin Total <=1.2 mg/dL 0.2 0.2   Glucose 70 - 99 mg/dL 135 (H) 84     Ketones: 10/1 (1844): <0.18    Imaging:   [facility] Comprehensive fetal US ()                                                                  IMPRESSION:  1) Abernathy intrauterine pregnancy at 29w 3d gestational age.  2) None of the anomalies commonly detected by ultrasound were evident in the detailed fetal anatomic survey described above, although evaluation of fetal anatomy was  suboptimal as noted above.  3) Growth parameters and estimated fetal weight were consistent with fetal growth restriction.  4) The amniotic fluid volume appeared normal.  5) The NST is reactive.  6) The umbilical artery Doppler is within normal limits.    ASSESSMENT/PLAN:   Selena Dutton is a 32 year old  at 29w6d by LMP c/w 9w3d US HD#3 admitted as a transfer of care from [facility] in the setting of new diagnosis of pre-eclampsia with severe features by blood pressure criteria and proteinuria also complicated by acidosis suspected due to starvation ketosis due to limiting of oral intake while hospitalized. Initially admitted to the ICU, transferred to the antepartum unit yesterday after acidosis resolved. Asymptomatic this morning. Blood pressures persistently mild range so will increased antihypertensives.       #Pre-eclampsia w/ SF (severe hypertension and proteinuria)  - Serial BP monitoring to keep BP < 160/110; will treat sustained severe range with short activing antihypertensives per algorithm   - Overnight, Bps persistently elevated in the mild range.    - Antihypertensives:   - S/p Nicardipine drip.   - Currently on Nifedipine 60 mg BID; Labetalol 300 TID> Will  increase to Labetalol 400 TID today   - Labs: HELLP labs wnl (last 10/2), UPC 0.67; will space out to q72 hours given stability   - Daily weights, strict I&Os  - S/p IV magnesium for eclampsia prophylaxis. Will re-initiate if signs and symptoms of pre-eclampsia worsen or moving forward with delivery.  - Signs of worsening pre-eclampsia with severe features that would warrant immediate delivery include (but are not present now):   Uncontrolled severe-range BP (systolic 160 or diastolic 110 or greater) not responsive to antiHTN medication  Persistent headaches, refractory to treatment  Epigastric pain or RUQ pain unresponsive to repeat analgesics  Visual disturbances, motor deficit or altered sensorium  Stroke   Myocardial infarction  HELLP syndrome  New or worsening renal dysfunction (serum creatinine > 1.1. mg/dl or twice baseline)  Elevated liver enzymes (more than twice upper limit of normal for AST and ALT)   Platelets (less than 100K)   Pulmonary edema  Eclampsia  Suspected placental abruption or vaginal bleeding in the absence of placenta previa     #Anion gap metabolic acidosis, suspect starvation ketosis; resolved    - S/p management by the ICU team with serial laboratory evaluation  - Resolve of anion gap and ketones  - Now tolerating PO      #FGR  - Newly diagnosed on US 9/29 w/ EFW 9%tile, UAD wnl.   - Will continue with q3 week growth ultrasound while inpatient and twice weekly (Tuesday/Friday) BPPs w/ UAD (had on Friday in [facility] clinic).     # FWB:   - TID monitoring   - S/p betamethasone course (9/29-90/30 at [id number])  - Will restart IV magnesium for neuroprotection - if delivery anticipated in next 12 hours and gestational age less than 32w (unless initiation of IV magnesium superceded for worsening pre-eclampsia with severe features)   - NICU for delivery, s/p consult  - S/p anesthesia consult   - Intrauterine resuscitative measures prn  - Signs of worsening fetal status warranting immediate  delivery which are not present now include:   Abnormal fetal testing (non-reassuring fetal monitoring)   Persistent reversed end-diastolic flow in the umbilical artery.     #MWB  - Comfort care measures, as needed  - Up ad bre  - Regular diet   - Patient expressed to us that in regards to life saving interventions were needed, that her life be addressed before her baby's life; assured patient that it in all likelihood would not come to this predicament and that there is a high likelihood that both her and her baby will have favorable outcome; patient expressed reassurance about this     # PNC  - Rh +, Rubella immune, ,   - collect GBS   - Other prenatal labs wnl  - Imaging: placenta posterior   - Follow up if received TdAP                 #Anxiety/Depression  - Continue PTA Zoloft     # Anemia  -Check ferritin     #Asthma  - Worse in pregnancy   - PTA Singulair, Flovent, Albuterol   - Avoid hemabate in the event of delivery w/ postpartum hemorrhage      #Delivery planning:   - Patient cephalic on US  so candidate for vaginal delivery pending stability.   - S/p discussion regarding the fact that pre-eclampsia is not necessary an indication for  section and that induction of labor is a possibility if delivery warranted. Reiterated however, that there are indications for  delivery if delivery needs to be expedited for example, severely elevated blood pressures that are unable to be managed.   - At that time we also reviewed that in the event a  section is needed, the possibility of classical uterine incision if the lower uterine segment is to narrow for a transverse low segment incision; also discussed the consequences of a classical uterine incision in relation to  deliveries needed for future pregnancies at 36 weeks gestation  - Risks of  section including but not limited to bleeding, infection, need for blood transfusion, injury to surrounding organs (ie  bowel/intestines, bladder, ureters, major blood vessels and nerves)., which if injured, then will be identified and repaired at time of surgery or will be repaired by surgeons that specialize in those areas. Also reviewed unintended injuries going unnoticed at the time of surgery, and that would require additional surgery to be done to have them repaired. Also reviewed risk of fetal injury and possible  hysterectomy for life threatening bleeding.   - Patient signed consent form for primary  section and verbally consented to blood transfusion if needed after conveying understanding of all that was discussed above; questions and concerns were addressed to her satisfaction  - Plan will be to deliver at 34 weeks given diagnosis of pre-eclampsia with severe features unless indicated earlier.     Iris Magallon MD   OBGYN resident PGY3  10/02/2023 10:26 AM     Physician Attestation   I saw this patient with the resident and agree with the resident/fellow's findings and plan of care as documented in the note.      Key findings: 32 yo  at 29w 6d admitted with preeclampsia with severe features based on blood pressure and new diagnosis of FGR. Initially required ICU admission for Nicardipine drip to control blood pressures, as well as management of anion gap acidosis that is now felt to be secondary to starvation ketosis. No further concern for acidosis. Blood pressures now in mild range with oral anti-hypertensive medications. Plan to increase labetalol to 400 mg TID today in addition to continuing Nifedipine XL 60 mg twice daily. Add ferritin to labs to evaluate for CONOR. Plan to check RPR with next labs and follow up TdAP vaccination status.     35 MINUTES SPENT BY ME on the date of service doing chart review, history, exam, documentation & further activities per the note.    I have personally reviewed the following data over the past 24 hrs:    13.3 (H)  \   10.6 (L)   / 183     138 106 14.2 /  79    4.2 21 (L) 0.77 \     ALT: 30 AST: 34 AP: 117 (H) TBILI: 0.2   ALB: 3.3 (L) TOT PROTEIN: 6.0 (L) LIPASE: N/A         Hilary Omalley MD  Date of Service (when I saw the patient): 10/02/23

## 2023-10-02 NOTE — PROVIDER NOTIFICATION
10/02/23 1242   Provider Notification   Provider Name/Title G3 pager   Method of Notification Electronic Page   Request Evaluate - Remote   Notification Reason Maternal Vital Sign Change     Latoya the med student returned my call and we discussed course of action for severe range BP's to start the labetalol algorithm. Will keep providers updated.

## 2023-10-02 NOTE — PROVIDER NOTIFICATION
10/02/23 1425   Provider Notification   Provider Name/Title Dr. Omalley   Method of Notification Electronic Page   Request Evaluate in Person   Notification Reason Maternal Vital Sign Change     Keeping provider informed of severe range pressures and management with anti-hypertensive protocol.

## 2023-10-02 NOTE — PROGRESS NOTES
JUAN acknowledges order for Antepartum Assessment. SW met briefly with Selena in her room this afternoon. Selena's mother was visiting her. Selena reported that her BP was high, and was currently being monitored. Selena reported that she is working on staying calm and stress-free, and hopeful that her BP will go down.     SW kept visit brief to allow Selena time to herself to work on calming. SW provided her contact information and briefly explained role of SW in antepartum and NICU, encouraging Selena to reach out via phone or email at any time with questions or needs.     Selena appeared receptive to SW support and services. JUAN will plan to follow up tomorrow to complete a full psychosocial assessment.     TUNG Ness, [facility]  Maternal and Child Health   Office: [phone]  Pager: [phone]  After Hours Pager: [phone]  [email or web address]

## 2023-10-02 NOTE — PLAN OF CARE
Problem: Hypertension Acute  Goal: Blood Pressure Within Desired Range  Outcome: Not Progressing   Patient started having severe range BP's at 1235 today. See flow sheets and MAR for hypertensive management. Followed Labetalol and then hydralazine algorithm.  EFM normal baseline, moderate variability, 10x10 accels present today. See flow sheet data and centricity charting. No contractions. Occasional deceleration, not recurrent.    Problem: Plan of Care - These are the overarching goals to be used throughout the patient stay.    Goal: Optimal Comfort and Wellbeing  Intervention: Provide Person-Centered Care  Recent Flowsheet Documentation  Taken 10/2/2023 0850 by Laly Sanchez, RN  Trust Relationship/Rapport:   care explained   choices provided     Problem: Fall Injury Risk  Goal: Absence of Fall and Fall-Related Injury  Outcome: Progressing   Goal Outcome Evaluation:      Plan of Care Reviewed With: patient    Overall Patient Progress: improvingOverall Patient Progress: improving

## 2023-10-02 NOTE — CONSULTS
_       [facility]                      Neonatology Advanced Practice Antepartum Counseling Consult      I was asked to provide antepartum counseling for Selena Dutton at the request of David Choi MD secondary to severe pre-eclampsia. Ms. Dutton is currently 29+5 weeks. Betamethasone was administered on  and . Ms. Dutton, accompanied by her , was counseled on the expected hospital course, potential risks, and outcomes associated with an infant born at approximately 30 weeks gestation. The counseling included: morbidity, mortality, initial delivery room stabilization, respiratory course, lung development, RDS, patent ductus arteriosus, retinopathy of prematurity, hemodynamic support, infection (including NEC), intraventricular hemorrhage, nutrition, growth and development, and long term outcomes. Please feel free to call with any additional questions or concerns.          KURT Hubbard, CNP   Advanced Practice Service    Intensive Care Unit  [facility]      Total Time (minutes): 30

## 2023-10-02 NOTE — PROGRESS NOTES
[facility] Brief Progress Note    Selena has had persistent severe range blood pressures over the course of the afternoon. She has received labetalol 20, 40 and 80 mg, as well as hydralazine 10 mg x1. She again had persistent severe range blood pressures following treatment. Selena is otherwise feeling well with no new concerns.    BP (!) 160/96 (BP Location: Left arm, Patient Position: Semi-Mccarty's, Cuff Size: Adult Regular)   Pulse 82   Temp 98.4  F (36.9  C) (Oral)   Resp 16   Wt 72.5 kg (159 lb 12.8 oz)   LMP 2023 (Exact Date)   SpO2 97%   BMI 25.03 kg/m      Discussed the recommendation to proceed with severe given the persistent severe range blood pressures that have been resistant to treatment, which is concerning for progression of her disease. We discussed that recommendation for delivery by  section given maternal status remote from delivery, which Ms. Dutton is in agreement with.    Plan:  1) NPO   2) Repeat preeclampsia labs and coags  3) Magnesium sulfate for neuroprotection and seizure prophylaxis (4 gram load/2 grams per hour infusion for 24 hour PP)  4) Updated Dr. Mckeon, [facility] delivering provider    Hilary Omalley MD  Specialist in Maternal-Fetal Medicine

## 2023-10-03 LAB
ALT SERPL W P-5'-P-CCNC: 39 U/L (ref 0–50)
AST SERPL W P-5'-P-CCNC: 65 U/L (ref 0–45)
CREAT SERPL-MCNC: 0.92 MG/DL (ref 0.51–0.95)
EGFRCR SERPLBLD CKD-EPI 2021: 84 ML/MIN/1.73M2
HGB BLD-MCNC: 9.7 G/DL (ref 11.7–15.7)
HOLD SPECIMEN: NORMAL
MAGNESIUM SERPL-MCNC: 8.4 MG/DL (ref 1.7–2.3)
PLATELET # BLD AUTO: 177 10E3/UL (ref 150–450)

## 2023-10-03 PROCEDURE — 120N000002 HC R&B MED SURG/OB UMMC

## 2023-10-03 PROCEDURE — 250N000013 HC RX MED GY IP 250 OP 250 PS 637

## 2023-10-03 PROCEDURE — 85018 HEMOGLOBIN: CPT

## 2023-10-03 PROCEDURE — 85049 AUTOMATED PLATELET COUNT: CPT

## 2023-10-03 PROCEDURE — 84450 TRANSFERASE (AST) (SGOT): CPT

## 2023-10-03 PROCEDURE — 88307 TISSUE EXAM BY PATHOLOGIST: CPT | Mod: 26 | Performed by: PATHOLOGY

## 2023-10-03 PROCEDURE — 82565 ASSAY OF CREATININE: CPT | Performed by: OBSTETRICS & GYNECOLOGY

## 2023-10-03 PROCEDURE — 258N000003 HC RX IP 258 OP 636

## 2023-10-03 PROCEDURE — 36415 COLL VENOUS BLD VENIPUNCTURE: CPT

## 2023-10-03 PROCEDURE — 84460 ALANINE AMINO (ALT) (SGPT): CPT

## 2023-10-03 PROCEDURE — 88307 TISSUE EXAM BY PATHOLOGIST: CPT | Mod: TC

## 2023-10-03 PROCEDURE — 258N000003 HC RX IP 258 OP 636: Performed by: OBSTETRICS & GYNECOLOGY

## 2023-10-03 PROCEDURE — 250N000011 HC RX IP 250 OP 636: Mod: JZ

## 2023-10-03 PROCEDURE — 250N000013 HC RX MED GY IP 250 OP 250 PS 637: Performed by: INTERNAL MEDICINE

## 2023-10-03 PROCEDURE — 83735 ASSAY OF MAGNESIUM: CPT

## 2023-10-03 RX ORDER — LABETALOL 200 MG/1
200 TABLET, FILM COATED ORAL EVERY 8 HOURS SCHEDULED
Status: DISCONTINUED | OUTPATIENT
Start: 2023-10-03 | End: 2023-10-04

## 2023-10-03 RX ORDER — SODIUM CHLORIDE, SODIUM LACTATE, POTASSIUM CHLORIDE, CALCIUM CHLORIDE 600; 310; 30; 20 MG/100ML; MG/100ML; MG/100ML; MG/100ML
INJECTION, SOLUTION INTRAVENOUS CONTINUOUS
Status: DISCONTINUED | OUTPATIENT
Start: 2023-10-03 | End: 2023-10-07 | Stop reason: HOSPADM

## 2023-10-03 RX ORDER — ACETAMINOPHEN 325 MG/1
975 TABLET ORAL EVERY 6 HOURS
Status: DISCONTINUED | OUTPATIENT
Start: 2023-10-03 | End: 2023-10-07 | Stop reason: HOSPADM

## 2023-10-03 RX ORDER — SODIUM CHLORIDE, SODIUM LACTATE, POTASSIUM CHLORIDE, CALCIUM CHLORIDE 600; 310; 30; 20 MG/100ML; MG/100ML; MG/100ML; MG/100ML
INJECTION, SOLUTION INTRAVENOUS
Status: DISCONTINUED
Start: 2023-10-03 | End: 2023-10-03 | Stop reason: HOSPADM

## 2023-10-03 RX ORDER — SODIUM CHLORIDE, SODIUM LACTATE, POTASSIUM CHLORIDE, CALCIUM CHLORIDE 600; 310; 30; 20 MG/100ML; MG/100ML; MG/100ML; MG/100ML
INJECTION, SOLUTION INTRAVENOUS
Status: COMPLETED
Start: 2023-10-03 | End: 2023-10-03

## 2023-10-03 RX ADMIN — NIFEDIPINE 60 MG: 30 TABLET, FILM COATED, EXTENDED RELEASE ORAL at 01:27

## 2023-10-03 RX ADMIN — ACETAMINOPHEN 975 MG: 325 TABLET, FILM COATED ORAL at 07:08

## 2023-10-03 RX ADMIN — LABETALOL HYDROCHLORIDE 400 MG: 200 TABLET, FILM COATED ORAL at 07:07

## 2023-10-03 RX ADMIN — SERTRALINE HYDROCHLORIDE 100 MG: 100 TABLET ORAL at 00:29

## 2023-10-03 RX ADMIN — KETOROLAC TROMETHAMINE 30 MG: 30 INJECTION, SOLUTION INTRAMUSCULAR; INTRAVENOUS at 14:08

## 2023-10-03 RX ADMIN — MONTELUKAST 10 MG: 10 TABLET, FILM COATED ORAL at 00:19

## 2023-10-03 RX ADMIN — MONTELUKAST 10 MG: 10 TABLET, FILM COATED ORAL at 23:17

## 2023-10-03 RX ADMIN — SENNOSIDES AND DOCUSATE SODIUM 1 TABLET: 50; 8.6 TABLET ORAL at 08:05

## 2023-10-03 RX ADMIN — SODIUM CHLORIDE, POTASSIUM CHLORIDE, SODIUM LACTATE AND CALCIUM CHLORIDE 1000 ML: 600; 310; 30; 20 INJECTION, SOLUTION INTRAVENOUS at 16:11

## 2023-10-03 RX ADMIN — SODIUM CHLORIDE, POTASSIUM CHLORIDE, SODIUM LACTATE AND CALCIUM CHLORIDE: 600; 310; 30; 20 INJECTION, SOLUTION INTRAVENOUS at 16:12

## 2023-10-03 RX ADMIN — CALCIUM CARBONATE (ANTACID) CHEW TAB 500 MG 1000 MG: 500 CHEW TAB at 08:05

## 2023-10-03 RX ADMIN — SODIUM CHLORIDE, POTASSIUM CHLORIDE, SODIUM LACTATE AND CALCIUM CHLORIDE 75 ML/HR: 600; 310; 30; 20 INJECTION, SOLUTION INTRAVENOUS at 02:02

## 2023-10-03 RX ADMIN — NIFEDIPINE 60 MG: 30 TABLET, FILM COATED, EXTENDED RELEASE ORAL at 20:39

## 2023-10-03 RX ADMIN — SERTRALINE HYDROCHLORIDE 100 MG: 100 TABLET ORAL at 20:39

## 2023-10-03 RX ADMIN — KETOROLAC TROMETHAMINE 30 MG: 30 INJECTION, SOLUTION INTRAMUSCULAR; INTRAVENOUS at 06:23

## 2023-10-03 RX ADMIN — ACETAMINOPHEN 975 MG: 325 TABLET, FILM COATED ORAL at 20:39

## 2023-10-03 RX ADMIN — LABETALOL HYDROCHLORIDE 200 MG: 200 TABLET, FILM COATED ORAL at 20:38

## 2023-10-03 RX ADMIN — SIMETHICONE 80 MG: 80 TABLET, CHEWABLE ORAL at 08:05

## 2023-10-03 RX ADMIN — CALCIUM CARBONATE (ANTACID) CHEW TAB 500 MG 1000 MG: 500 CHEW TAB at 20:39

## 2023-10-03 RX ADMIN — LABETALOL HYDROCHLORIDE 400 MG: 200 TABLET, FILM COATED ORAL at 00:15

## 2023-10-03 RX ADMIN — IBUPROFEN 800 MG: 800 TABLET ORAL at 20:38

## 2023-10-03 RX ADMIN — SENNOSIDES AND DOCUSATE SODIUM 2 TABLET: 50; 8.6 TABLET ORAL at 20:39

## 2023-10-03 RX ADMIN — CALCIUM CARBONATE (ANTACID) CHEW TAB 500 MG 1000 MG: 500 CHEW TAB at 14:08

## 2023-10-03 RX ADMIN — SIMETHICONE 80 MG: 80 TABLET, CHEWABLE ORAL at 14:27

## 2023-10-03 RX ADMIN — KETOROLAC TROMETHAMINE 30 MG: 30 INJECTION, SOLUTION INTRAMUSCULAR; INTRAVENOUS at 00:00

## 2023-10-03 RX ADMIN — NIFEDIPINE 60 MG: 30 TABLET, FILM COATED, EXTENDED RELEASE ORAL at 08:05

## 2023-10-03 RX ADMIN — ACETAMINOPHEN 975 MG: 325 TABLET, FILM COATED ORAL at 14:07

## 2023-10-03 RX ADMIN — ENOXAPARIN SODIUM 40 MG: 40 INJECTION SUBCUTANEOUS at 12:17

## 2023-10-03 ASSESSMENT — ACTIVITIES OF DAILY LIVING (ADL)
ADLS_ACUITY_SCORE: 22
ADLS_ACUITY_SCORE: 26
ADLS_ACUITY_SCORE: 22
ADLS_ACUITY_SCORE: 22
ADLS_ACUITY_SCORE: 26

## 2023-10-03 NOTE — ANESTHESIA CARE TRANSFER NOTE
Patient: Selena Dutton    Procedure: Procedure(s):   section       Diagnosis: * No pre-op diagnosis entered *  Diagnosis Additional Information: No value filed.    Anesthesia Type:   Spinal     Note:    Oropharynx: oropharynx clear of all foreign objects and spontaneously breathing  Level of Consciousness: awake  Oxygen Supplementation: room air    Independent Airway: airway patency satisfactory and stable  Dentition: dentition unchanged  Vital Signs Stable: post-procedure vital signs reviewed and stable  Report to RN Given: handoff report given  Patient transferred to: PACU    Handoff Report: Identifed the Patient, Identified the Reponsible Provider, Reviewed the pertinent medical history, Discussed the surgical course, Reviewed Intra-OP anesthesia mangement and issues during anesthesia, Set expectations for post-procedure period and Allowed opportunity for questions and acknowledgement of understanding      Vitals:  Vitals Value Taken Time   BP     Temp     Pulse     Resp     SpO2         Electronically Signed By: Kaylen Pham MD  2023  9:43 PM

## 2023-10-03 NOTE — ANESTHESIA PROCEDURE NOTES
"TAP Procedure Note    Pre-Procedure   Staff -        Anesthesiologist:  Adolfo Barros MD       Resident/Fellow: Kaylen Pham MD       Performed By: resident       Location: OB       Pre-Anesthestic Checklist: patient identified, IV checked, site marked, risks and benefits discussed, informed consent, monitors and equipment checked, pre-op evaluation, at physician/surgeon's request and post-op pain management  Timeout:       Correct Patient: Yes        Correct Procedure: Yes        Correct Site: Yes        Correct Position: Yes        Correct Laterality: Yes        Site Marked: Yes  Procedure Documentation  Procedure: TAP       Diagnosis: POST OPERATIVE PAIN       Laterality: bilateral       Patient Position: supine       Patient Prep/Sterile Barriers: sterile gloves, mask       Skin prep: Chloraprep       Needle Type: short bevel       Needle Gauge: 21.        Needle Length (millimeters): 110        Ultrasound guided       1. Ultrasound was used to identify targeted nerve, plexus, vascular marker, or fascial plane and place a needle adjacent to it in real-time.       2. Ultrasound was used to visualize the spread of anesthetic in close proximity to the above referenced structure.       3. A permanent image is entered into the patient's record.    Assessment/Narrative         The placement was negative for: blood aspirated, painful injection and site bleeding       Paresthesias: No.       Bolus given via needle..        Secured via.        Insertion/Infusion Method: Single Shot       Complications: none       Injection made incrementally with aspirations every 5 mL.    Medication(s) Administered   Bupivacaine 0.25% PF (Infiltration) - Infiltration   20 mL - 10/2/2023 9:37:00 PM  Bupivacaine liposome (Exparel) 1.3% LA inj susp (Infiltration) - Infiltration   20 mL - 10/2/2023 9:37:00 PM    FOR [facility] ([facility]) ONLY:   Pain Team Contact information: please page the Pain Team Via [employer]. Search \"Pain\". During " daytime hours, please page the attending first. At night please page the resident first.

## 2023-10-03 NOTE — OP NOTE
[facility]   OPERATIVE NOTE:  SECTION     Surgery Date:  2023  Surgeon(s): Khadijah Mckeon MD  Assistants:  Charlie Ghotra MD, MPH, PGY2    Preoperative Diagnoses:  -  at 29w6d  - PreE w/ SF  - Refractory sustained severe range blood pressures  - FGR  - Asthma  - MDD/JAQUELIN    Postoperative diagnoses:  - Same as above, now delivered    Procedure performed:  Primary low segment transverse  section via Pfannenstiel skin incision with partial double layer uterine closure    Anesthesia:  Spinal with duramorph  Quant Blood Loss (mL): 1342 mL  Specimens: Placenta, cord blood, cord gases  Complications: None      Operative findings:   1. Single, viable male infant at 2041 hours on 10/2/2023. Apgars pending.  Birth weight: 1100 g.  Fetal presentation: vertex. Amniotic fluid: clear.    2.  Placenta intact with 3 vessel cord. Focal area of placenta adherent to posterior aspect of endometrium resolved with gentle traction. Bleeding appropriate and tone improved with closure of hysterotomy and replacement of uterus in situ.  4. Normal appearing uterus, fallopian tubes, ovaries.   5.  No intraabdominal adhesions.  No abdominal wall adhesions.  6. During episode of emesis, large and small bowel outside of abdominal cavity at the time of imbrication of the hysterotomy. Suture needle exposed although tucked near the uterine serosa making injury to surrounding structures unlikely. No signs of injury when bowel returned to abdomen after correction of nausea and vomiting.     Indication: Selena Dutton is a 33 year old, , who was admitted at 29w3d by LMP c/w 9w3d ultrasound for preeclampsia with severe features requiring ICU level care for nicardipine infusion and magnesium prophylaxis. On HD#2, following stabilization of blood pressures and discontinuation of nicardipine and magnesium infusions, she was transferred to the antepartum unit. On HD#3, she was again requiring multiple  IV antihypertensives for sustained severe range blood pressures. Given refractory severe range blood pressures following betamethasone course, delivery recommended. The risks, benefits, and alternatives of  delivery were previously explained and the patient agreed to proceed.     Procedure details:  After obtaining informed consent, the patient was taken to the operating room. She received 2g Ancef prior to the skin incision. She was placed in the dorsal supine position with a leftward tilt and prepped and draped in the usual sterile fashion.     Following test of adequate spinal anesthesia, the abdomen was entered through a transverse skin incision. The skin incision was made sharply and carried through the subcutaneous tissue to the fascia.  Fascia was incised in bilaterally and extended laterally with blunt dissection. The muscle was  in the midline.      The peritoneum was entered bluntly and the opening extended by digital dissection with care to avoid the bladder. A bladder blade was placed. The lower segment of the uterus was opened sharply in a transverse fashion and extended with digital pressure. The infant's head was noted to be in the vertex position. It was elevated to the level of the hysterotomy and was delivered atraumatically, shoulders delivered easily thereafter. The cord was doubly clamped after 30 seconds and cut and the infant was handed off to the waiting [facility] staff. A segment of the cord was cut and set aside for cord gases if needed.     The placenta was expressed with gentle traction. The uterus was exteriorized from the abdomen and cleared of all clots and debris.  The uterus was massaged and was noted to be boggy.  Pitocin was given through the running IV.  TXA was administered. The hysterotomy was repaired with 0-vicryl suture in a running locked fashion and good hemostasis was noted. The uterus was returned to the abdomen.      The bilateral pericolic gutters were  cleared.  The hysterotomy was again inspected and found to be oozing at the left apex of the hysterotomy. A 2nd layer of 0-Vicryl was used to imbricate the incision starting at the left apex. Approximately three quarters of the incision was imbricated when the patient developed severe nausea and vomiting. Following resolution, the bowels were returned to the abdomen and the needle was removed. Suture needle exposed although tucked near the uterine serosa making injury to surrounding structures unlikely. No signs of injury when bowel returned to abdomen after correction of nausea and vomiting. The suture was completed. The hysterotomy was again examined and good hemostasis was achieved. The bladder flap was inspected and found to be hemostatic after use of electrocautery.     The abdominal wall was examined and also found to be hemostatic.  The fascia was closed with a running suture of 0-Vicryl.  Subcutaneous tissue was irrigated. Areas that were not hemostatic were controlled with cautery. The subcutaneous tissue was less than 2cm in depth and did not require reapproximation. The skin was closed with 4-0 monocryl, steris strips and sterile dressing was applied. The patient tolerated the procedure well and was taken to the recovery room in stable condition. All sponge, needle and instrument counts were correct x2.    Dr. Mckeon was present for the entire procedure.     Charlie Ghotra MD, MPH  Obstetrics and Gyncology, PGY-3  October 2, 2023, 11:48 PM     I was present and scrubbed for the entire procedure. Khadijah Mckeon MD

## 2023-10-03 NOTE — ANESTHESIA PROCEDURE NOTES
"Intrathecal injection Procedure Note    Pre-Procedure   Staff -        Anesthesiologist:  Adolfo Barros MD       Resident/Fellow: Kaylen Pham MD       Performed By: resident       Location: OB       Pre-Anesthestic Checklist: patient identified, IV checked, risks and benefits discussed, informed consent, monitors and equipment checked, pre-op evaluation, at physician/surgeon's request and post-op pain management  Timeout:       Correct Patient: Yes        Correct Procedure: Yes        Correct Site: Yes        Correct Position: Yes   Procedure Documentation  Procedure: intrathecal injection       Diagnosis: analgesia       Patient Position: sitting       Patient Prep/Sterile Barriers: sterile gloves, mask, patient draped       Skin prep: Chloraprep       Insertion Site: L3-4. (midline approach).       Needle Gauge: 25.        Needle Length (Inches): 3.5        Spinal Needle Type: Pencan       Introducer used       Introducer: 20 G       # of attempts: 1 and  # of redirects:  0    Assessment/Narrative         Paresthesias: No.       CSF fluid: clear.    Medication(s) Administered   0.75% Hyperbaric Bupivacaine (Intrathecal) - Intrathecal   1.6 mL - 10/2/2023 8:14:00 PM  Fentanyl PF (Intrathecal) - Intrathecal   15 mcg - 10/2/2023 8:14:00 PM  Morphine PF 1 mg/mL (Intrathecal) - Intrathecal   0.15 mg - 10/2/2023 8:14:00 PM    FOR [facility] ([facility]) ONLY:   Pain Team Contact information: please page the Pain Team Via [employer]. Search \"Pain\". During daytime hours, please page the attending first. At night please page the resident first.      "

## 2023-10-03 NOTE — PROGRESS NOTES
Magnesium Check Postpartum Progress Note  S:  Patient reports feeling well this AM. Pain well controlled. Tolerating PO. Pugh in place and not yet ambulating. Lochia within normal limits. Denies any fever, chills, SOB, chest pain, N/V, headache, vision changes, RUQ pain, dizziness.     O:  Patient Vitals for the past 24 hrs:   BP Temp Temp src Pulse Resp SpO2   10/03/23 1200 91/59 97.3  F (36.3  C) Oral 64 -- 96 %   10/03/23 1100 118/80 -- -- 60 -- 95 %   10/03/23 1000 117/86 -- -- 59 16 95 %   10/03/23 0939 (!) 122/92 -- -- 59 18 94 %   10/03/23 0802 (!) 129/94 97.9  F (36.6  C) Oral 64 16 95 %   10/03/23 0700 (!) 140/97 -- -- 59 16 95 %   10/03/23 0600 (!) 133/92 -- -- 63 16 95 %   10/03/23 0500 (!) 136/91 -- -- 60 18 95 %   10/03/23 0400 (!) 127/91 -- -- 64 18 96 %   10/03/23 0300 137/88 -- -- 68 18 94 %   10/03/23 0200 (!) 145/94 -- -- 68 18 94 %   10/03/23 0115 (!) 144/94 98.5  F (36.9  C) Oral 69 18 94 %   10/02/23 2345 (!) 140/92 -- -- -- -- --   10/02/23 2330 (!) 135/90 -- -- -- -- --   10/02/23 2315 (!) 133/92 -- -- -- -- --   10/02/23 2300 129/88 -- -- -- -- --   10/02/23 2245 134/81 -- -- -- -- --   10/02/23 2230 134/88 -- -- -- -- --   10/02/23 2215 (!) 122/106 -- -- -- -- --   10/02/23 2200 123/89 -- -- -- -- --   10/02/23 2145 127/86 -- -- -- -- --   10/02/23 1741 (!) 168/88 -- -- -- -- --   10/02/23 1726 (!) 160/96 -- -- -- -- --   10/02/23 1713 (!) 161/95 -- -- -- -- --   10/02/23 1656 (!) 164/95 -- -- -- -- --   10/02/23 1641 (!) 158/92 -- -- -- -- --   10/02/23 1626 (!) 151/89 -- -- -- -- --   10/02/23 1611 (!) 156/87 -- -- -- -- --   10/02/23 1555 (!) 152/89 -- -- -- -- --   10/02/23 1545 (!) 143/82 -- -- -- -- --   10/02/23 1537 (!) 144/83 -- -- -- -- --   10/02/23 1526 137/86 -- -- -- -- --   10/02/23 1515 (!) 153/83 -- -- -- -- 97 %   10/02/23 1505 (!) 150/84 -- -- -- -- 98 %   10/02/23 1455 (!) 154/87 -- -- -- -- 98 %   10/02/23 1445 (!) 166/96 -- -- -- -- --   10/02/23 1435 (!) 175/97 -- --  -- -- --   10/02/23 1425 (!) 175/92 -- -- -- -- --   10/02/23 1415 (!) 154/88 -- -- -- -- --   10/02/23 1405 (!) 161/91 -- -- -- -- --   10/02/23 1356 (!) 178/99 -- -- -- -- --   10/02/23 1345 (!) 179/104 -- -- -- -- --   10/02/23 1335 (!) 167/97 -- -- -- -- --   10/02/23 1326 (!) 174/99 -- -- -- -- --   10/02/23 1315 (!) 179/91 -- -- -- -- --   10/02/23 1258 (!) 168/94 -- -- -- -- --   10/02/23 1242 (!) 168/ -- -- -- -- --       Gen: Resting comfortably, NAD  CV: RRR, no murmurs  Resp: Non-labored breathing, CTAB  Abd: Soft, non-tender, fundus below the umbilicus, bandage in place, clean and dry  Extrem: 1+ edema BLE, no calf tenderness  Neuro: 2+ bicep reflexes.     UOP: 200 mL over 4 hours    HGB  Recent Labs   Lab 10/03/23  0758 10/02/23  1744 10/02/23  0833 10/01/23  1844   HGB 9.7* 10.2* 10.6* 10.1*       A/P:  Selena Dutton is a 33 year old  at 29w6d who is POD#1 s/p PLTCS for worsening blood pressures refractory to treatment in the setting of preeclampsia with severe features. Doing well, no signs of Mg toxicity. BP wnl.    # Preeclampsia w/ severe features:  - BP's MR to normotensive, continue to monitor and treat sustained severe range per protocol  - Mg 4g>2g/h. No s/s of mag toxicity. No symptoms  - Plan to discontinue magnesium at 24 hours postpartum  - D#3 60 BID, D#2 Labetalol 400 TID, continue this regimen today given odd timing of administration due to delivery. Uptitrate PRN.  - Strict I&O, UOP adequate  - PreE labs nl, repeat ordered for this AM  - Continue q4h clinical mag checks    # Postpartum Care  - Pain: Tylenol, ibuprofen.  - Heme: 10.2> EBL 1342 (TXA)> AM Hemoglobin pending; will discharge with oral iron if <100.  - GI: Regular diet. Bowel regimen. Antiemetics PRN.  - gomez in place  - Rh positive, Rubella immune    # Asthma  - PTA Albuterol, singulair, flovent continued in house    # JAQUELIN/MDD  - PTA Zoloft   - Mood stable  - SW consult PRN    Charlie Ghotra MD, MPH  Ob/Gyn Resident,  "PGY-3  10/03/23 6:50 AM     [facility] staff:  Appreciate note by Dr. Ghotra.  I have seen and examined the patient without the resident. I have reviewed, edited, and agree with the note.    My findings are:  Magnesium was turned off after value 8.4.  Pt feeling weak and \"out of it\" at that time.  She is feeling a bit better now. Denies HA or vision changes.      BP 91/59   Pulse 64   Temp 97.3  F (36.3  C) (Oral)   Resp 16   Wt 72.5 kg (159 lb 12.8 oz)   LMP 03/07/2023 (Exact Date)   SpO2 96%   Breastfeeding Unknown   BMI 25.03 kg/m    DRT 3+ b/L LE  Pumping for NICU baby  Will restart magnesium at 1 gr/hr.   Lani Hooper MD, FACOG  10/3/2023  12:37 PM    "

## 2023-10-03 NOTE — PLAN OF CARE
Goal Outcome Evaluation:      Plan of Care Reviewed With: patient, spouse    Overall Patient Progress: improvingOverall Patient Progress: improving    VSS. Magnesium continues to infuse @ 1gm/hr. Pt denies dizziness, HA, vision changes or epigastric pain. Pt up at the bedside this afternoon & is currently in the NICU visiting baby. Pt pumping every 3 hours. Pt taking toradol & tylenol for pain, declines need for additional pain medication.   & mother here, both very supportive.

## 2023-10-03 NOTE — PLAN OF CARE
Transfer from OB PACU to Room 7145 at 0040  Transferred to bed via hovermat  Selena Dutton,  33 year old F s/p  Section  Anesthesia Type:  Spinal  Surgeon: Dr. Hilary Omalley  Allergies: Peanuts, Nuts  DNR: Yes  Pertinent Medical History: see medical record   QBL: 976 ml   IVF: Magnesium Sulfate infusion and LR   UOP: 240 ml  NPO: No   Vomiting: No  Drainage:  None  Pain: 2/10   Airway: SPO2 96 on room air at time of transfer   See PACU record for ongoing assessment, vital signs and pain assessment.

## 2023-10-03 NOTE — BRIEF OP NOTE
[facility]    Brief Operative Note    Pre-operative diagnosis:   - IUP at 29w6d  - PreE w/ SF  - FGR  - Asthma    Post-operative diagnosis Same as pre-operative diagnosis    Procedure: PLTCS    Surgeon: Surgeon(s) and Role:     * Khadijah Mckeon MD - Primary     * Charlie Ghotra MD MPH PGY3 - Assisting  Anesthesia: Spinal   Estimated Blood Loss: 800 mL, QBL pending    Drains: Pugh  Specimens: Placenta, cord blood, cord gases  Findings:     1. Single, viable male infant 2041 on 10/02/23. Apgars pending.  Birth weight: 1100 g.  Fetal presentation: vertex. Amniotic fluid: clear.    2. Arterial cord gases pending.    3. Placenta intact with 3 vessel cord.     4. Normal appearing uterus, fallopian tubes, ovaries.   5. No intraabdominal adhesions.  No abdominal wall adhesions.   6. During episode of emesis, large and small bowel outside of abdominal cavity at the time of imbrication of the hysterotomy. Suture needle exposed although tucked near the uterine serosa making injury to surrounding structures unlikely. No signs of injury when bowel returned to abdomen after correction of nausea and vomiting.  Complications: None  Implants: * No implants in log *    Charlie Ghotra MD, MPH  Ob/Gyn Resident, PGY-3  10/02/23 7:31 PM

## 2023-10-03 NOTE — ANESTHESIA POSTPROCEDURE EVALUATION
Patient: Selena Dutton    Procedure: Procedure(s):   section       Anesthesia Type:  Spinal    Note:  Disposition: Inpatient   Postop Pain Control: Uneventful            Sign Out: Well controlled pain   PONV: No   Neuro/Psych: Uneventful            Sign Out: Acceptable/Baseline neuro status   Airway/Respiratory: Uneventful            Sign Out: Acceptable/Baseline resp. status   CV/Hemodynamics: Uneventful            Sign Out: Acceptable CV status; No obvious hypovolemia; No obvious fluid overload   Other NRE: NONE   DID A NON-ROUTINE EVENT OCCUR?            Last vitals:  Vitals Value Taken Time   /88 10/02/23 2230   Temp     Pulse     Resp     SpO2         Electronically Signed By: Adolfo Barros MD  2023  10:52 PM

## 2023-10-03 NOTE — CONSULTS
"Copied from baby's chart:    Social Work Initial Consult     DATA/ASSESSMENT     General Information  Assessment completed with: Selena (mother) & Shiv (father)  Type of visit: Initial Assessment      Reason for Consult: NICU admission     Living Environment:   Chiquis live in a house in [facility] [place] [facility]. \"Carmela\" is their first baby.   Able to return to prior arrangements: yes     Family Factors  Family Risk Factors: first time parents, maternal mental health history or concerns, unexpected hospitalization     Family Strength Factors: able and willing to advocate for self/family, able and willing to ask for help/accept help, connected with mental health support, demonstrated ability to integrate new information actively seeking resources, demonstrated commitment to being present and engaged in cares, local family, parental employment, reliable transportation, stable housing, strong social support, willingness to havee vulnerable conversations about emotions     Neglect and Abuse: n/a  History of Termination of Parental Rights: no   Substance Exposure: n/a  Birth to Minor: no     Family has safe sleep option?: yes  Family Has Car Seat: yes     Selena and Shiv plan to seek primary [facility] Carm[facility].     Assessment of Support  Parental Marital Status: Selena and Shiv are   Who is your support system: Selena reports that the couple has a strong support system that includes their parents, siblings, and friends  Description of Support System: Supportive, Involved  Support Assessment: Adequate family and caregiver support, Adequate social supports     Employment/Financial/Insurance  Shiv is employed as a  at the [employer]. Selena works at [employer] in [employer]. She plans to take a leave of absence and is working with her employer on this.      No financial concerns were reported. JUAN did share information about where and how to purchase discounted hospital " "parking passes.      Chiquis have [employer] insurance coverage through Shiv's employer, and plan to add Carmela to this plan.       Mental Health/Coping  Selena and Shiv appear to be coping well despite being overwhelmed by Selena's pregnancy complications and the early birth of Carmela. They are both open and willing to talk about their emotions and needs. Selena reports that she feels well educated on PMADs and mental health during and after pregnancy and has friends who have experienced postpartum mental health concerns. Selena endorses diagnoses of Anxiety and Depression and reports taking medication for these. She is interested in finding a therapist as another source of support for her mental health as a new mother with a baby in the NICU. SW will provide some resources for therapist and assist in a referral for her.            Additional Information:  SW provided Selena and Shiv with a copy of \"My Premie Baby Book,\" so that they can document their time with Carmela in the NICU. Selena and Shiv were open to ongoing support from SW. SW provided contact information and encouraged them to reach out with any questions or needs.      INTERVENTION  Conducted chart review and consulted with medical team regarding plan of care.   Introduced SW role and scope of practice.   Orientation to the unit (parking, lodging, meals, visitation)  Provided assessment of patient and family's level of coping  Conducted psychosocial assessment   Validated emotions and provided supportive listening  Facilitated service linkage with hospital and community resources  Described process for obtaining birth certificate, social security card and insurance  Provided SW contact info     PLAN  SW will continue to follow for supportive intervention throughout NICU admission.     TUNG Ness, [facility]  Maternal and Child Health   Office: [phone]  Pager: [phone]  After Hours Pager: [phone]  [email or web address]     "

## 2023-10-03 NOTE — PLAN OF CARE
Vital signs stable. Postpartum assessment WDL. Incision clean, dry, intact with dressing in place. Patient denied any pain. Dangled at bedside at 0630 and felt dizzy. Pugh still in place and will attempt OOB again after breakfast and resting. Patient not passing gas yet. Plans to breast pump for her baby in nicu. Patient on Mag @ 2g/hr except for dizziness, denies headache, no N/V, no vision changes. Bilat L/E edema and brisk reflexes and no clonus. Will continue with current plan of care.

## 2023-10-03 NOTE — PLAN OF CARE
Goal Outcome Evaluation:      Plan of Care Reviewed With: patient           Data: BP Mild to Low range, MDs updated and decreased Labetalol.  Brisk reflexes, no clonus, Mag stopped for portion due to elevated Mag level and restarted at 1g/hr.  Postpartum checks within normal limits - see flow record. Patient eating and drinking normally. Patient has Pugh; paged G2 Gwan to see if Pugh can stay in for I/O monitoring, awaiting response. No apparent signs of infection. Incision healing well, covered with dressing, CDI. Patient performing self cares and is pumping for infant in NICU and getting drops.  Action: Patient medicated during the shift for pain. See MAR. Patient reassessed within 1 hour after each medication and pain was improved - patient stated she was comfortable. Patient education done about pumping set up, frequency and hand expression of colostrum. See flow record.  Response: Positive attachment behaviors observed with infant. Support persons  Shiv present.   Plan: Anticipate discharge POD 3 pending BPs.

## 2023-10-03 NOTE — PROVIDER NOTIFICATION
10/03/23 0947   Critical Test Results/Notification   Critical Lab Result (Lab Name and Value) Mag 8.4   What Time Did The Lab Notify You? 0947   Provider Notified yes   Date of Provider Notification 10/03/23   Time of Provider Notification 0947   Mechanism of Provider notification page   What Provider Did You Notify? aSscha, G2   Response other (see comment)  (verbal order to stop Magnesium)

## 2023-10-03 NOTE — PROVIDER NOTIFICATION
10/03/23 1200   Vital Signs   Temp 97.3  F (36.3  C)   Temp src Oral   Pulse 64   Pulse Rate Source Monitor   BP 91/59   Oxygen Therapy   SpO2 96 %   O2 Device None (Room air)     Updated G2 Gwan of lower range BP

## 2023-10-04 LAB
ALBUMIN SERPL BCG-MCNC: 2.8 G/DL (ref 3.5–5.2)
ALP SERPL-CCNC: 106 U/L (ref 35–104)
ALT SERPL W P-5'-P-CCNC: 28 U/L (ref 0–50)
ALT SERPL W P-5'-P-CCNC: 36 U/L (ref 0–50)
ANION GAP SERPL CALCULATED.3IONS-SCNC: 10 MMOL/L (ref 7–15)
AST SERPL W P-5'-P-CCNC: 24 U/L (ref 0–45)
AST SERPL W P-5'-P-CCNC: 42 U/L (ref 0–45)
BILIRUB SERPL-MCNC: <0.2 MG/DL
BUN SERPL-MCNC: 18.3 MG/DL (ref 6–20)
CALCIUM SERPL-MCNC: 8.8 MG/DL (ref 8.6–10)
CHLORIDE SERPL-SCNC: 104 MMOL/L (ref 98–107)
CREAT SERPL-MCNC: 0.82 MG/DL (ref 0.51–0.95)
CREAT SERPL-MCNC: 0.86 MG/DL (ref 0.51–0.95)
DEPRECATED HCO3 PLAS-SCNC: 21 MMOL/L (ref 22–29)
EGFRCR SERPLBLD CKD-EPI 2021: >90 ML/MIN/1.73M2
EGFRCR SERPLBLD CKD-EPI 2021: >90 ML/MIN/1.73M2
ERYTHROCYTE [DISTWIDTH] IN BLOOD BY AUTOMATED COUNT: 14.1 % (ref 10–15)
ERYTHROCYTE [DISTWIDTH] IN BLOOD BY AUTOMATED COUNT: 14.2 % (ref 10–15)
GLUCOSE SERPL-MCNC: 110 MG/DL (ref 70–99)
HCT VFR BLD AUTO: 28.1 % (ref 35–47)
HCT VFR BLD AUTO: 31.4 % (ref 35–47)
HGB BLD-MCNC: 10.1 G/DL (ref 11.7–15.7)
HGB BLD-MCNC: 9.5 G/DL (ref 11.7–15.7)
MCH RBC QN AUTO: 29.8 PG (ref 26.5–33)
MCH RBC QN AUTO: 30.7 PG (ref 26.5–33)
MCHC RBC AUTO-ENTMCNC: 32.2 G/DL (ref 31.5–36.5)
MCHC RBC AUTO-ENTMCNC: 33.8 G/DL (ref 31.5–36.5)
MCV RBC AUTO: 91 FL (ref 78–100)
MCV RBC AUTO: 93 FL (ref 78–100)
PLATELET # BLD AUTO: 181 10E3/UL (ref 150–450)
PLATELET # BLD AUTO: 183 10E3/UL (ref 150–450)
POTASSIUM SERPL-SCNC: 3.8 MMOL/L (ref 3.4–5.3)
PROT SERPL-MCNC: 5.2 G/DL (ref 6.4–8.3)
RBC # BLD AUTO: 3.09 10E6/UL (ref 3.8–5.2)
RBC # BLD AUTO: 3.39 10E6/UL (ref 3.8–5.2)
SODIUM SERPL-SCNC: 135 MMOL/L (ref 135–145)
WBC # BLD AUTO: 10.2 10E3/UL (ref 4–11)
WBC # BLD AUTO: 11.1 10E3/UL (ref 4–11)

## 2023-10-04 PROCEDURE — 250N000013 HC RX MED GY IP 250 OP 250 PS 637

## 2023-10-04 PROCEDURE — 99232 SBSQ HOSP IP/OBS MODERATE 35: CPT | Mod: GC | Performed by: OBSTETRICS & GYNECOLOGY

## 2023-10-04 PROCEDURE — 250N000011 HC RX IP 250 OP 636: Mod: JZ

## 2023-10-04 PROCEDURE — 85027 COMPLETE CBC AUTOMATED: CPT

## 2023-10-04 PROCEDURE — 36415 COLL VENOUS BLD VENIPUNCTURE: CPT

## 2023-10-04 PROCEDURE — 84460 ALANINE AMINO (ALT) (SGPT): CPT

## 2023-10-04 PROCEDURE — 84450 TRANSFERASE (AST) (SGOT): CPT

## 2023-10-04 PROCEDURE — 80053 COMPREHEN METABOLIC PANEL: CPT

## 2023-10-04 PROCEDURE — 120N000002 HC R&B MED SURG/OB UMMC

## 2023-10-04 RX ORDER — LABETALOL 300 MG/1
600 TABLET, FILM COATED ORAL EVERY 8 HOURS SCHEDULED
Status: DISCONTINUED | OUTPATIENT
Start: 2023-10-04 | End: 2023-10-04

## 2023-10-04 RX ORDER — LABETALOL 200 MG/1
400 TABLET, FILM COATED ORAL ONCE
Status: COMPLETED | OUTPATIENT
Start: 2023-10-04 | End: 2023-10-04

## 2023-10-04 RX ORDER — FLUTICASONE PROPIONATE 110 UG/1
1 AEROSOL, METERED RESPIRATORY (INHALATION) 2 TIMES DAILY
Status: DISCONTINUED | OUTPATIENT
Start: 2023-10-04 | End: 2023-10-07 | Stop reason: HOSPADM

## 2023-10-04 RX ORDER — MONTELUKAST SODIUM 10 MG/1
10 TABLET ORAL AT BEDTIME
Status: DISCONTINUED | OUTPATIENT
Start: 2023-10-04 | End: 2023-10-07 | Stop reason: HOSPADM

## 2023-10-04 RX ORDER — LABETALOL HYDROCHLORIDE 5 MG/ML
20 INJECTION, SOLUTION INTRAVENOUS
Status: DISCONTINUED | OUTPATIENT
Start: 2023-10-04 | End: 2023-10-07 | Stop reason: HOSPADM

## 2023-10-04 RX ORDER — NIFEDIPINE 10 MG/1
10-20 CAPSULE ORAL
Status: DISCONTINUED | OUTPATIENT
Start: 2023-10-04 | End: 2023-10-07 | Stop reason: HOSPADM

## 2023-10-04 RX ORDER — LABETALOL 200 MG/1
400 TABLET, FILM COATED ORAL EVERY 8 HOURS SCHEDULED
Status: DISCONTINUED | OUTPATIENT
Start: 2023-10-04 | End: 2023-10-04

## 2023-10-04 RX ORDER — ALBUTEROL SULFATE 90 UG/1
2 AEROSOL, METERED RESPIRATORY (INHALATION) EVERY 6 HOURS
Status: DISCONTINUED | OUTPATIENT
Start: 2023-10-04 | End: 2023-10-07 | Stop reason: HOSPADM

## 2023-10-04 RX ADMIN — ALBUTEROL SULFATE 2 PUFF: 90 INHALANT RESPIRATORY (INHALATION) at 20:26

## 2023-10-04 RX ADMIN — LABETALOL HYDROCHLORIDE 80 MG: 5 INJECTION, SOLUTION INTRAVENOUS at 02:54

## 2023-10-04 RX ADMIN — FLUTICASONE PROPIONATE 1 PUFF: 110 AEROSOL, METERED RESPIRATORY (INHALATION) at 12:35

## 2023-10-04 RX ADMIN — LABETALOL HYDROCHLORIDE 40 MG: 5 INJECTION, SOLUTION INTRAVENOUS at 21:09

## 2023-10-04 RX ADMIN — IBUPROFEN 800 MG: 800 TABLET ORAL at 02:43

## 2023-10-04 RX ADMIN — LABETALOL HYDROCHLORIDE 40 MG: 5 INJECTION, SOLUTION INTRAVENOUS at 02:33

## 2023-10-04 RX ADMIN — LABETALOL HYDROCHLORIDE 400 MG: 200 TABLET, FILM COATED ORAL at 03:29

## 2023-10-04 RX ADMIN — ENOXAPARIN SODIUM 40 MG: 40 INJECTION SUBCUTANEOUS at 12:35

## 2023-10-04 RX ADMIN — LABETALOL HYDROCHLORIDE 800 MG: 300 TABLET, FILM COATED ORAL at 20:21

## 2023-10-04 RX ADMIN — IBUPROFEN 800 MG: 800 TABLET ORAL at 08:13

## 2023-10-04 RX ADMIN — LABETALOL HYDROCHLORIDE 20 MG: 5 INJECTION, SOLUTION INTRAVENOUS at 20:50

## 2023-10-04 RX ADMIN — LABETALOL HYDROCHLORIDE 80 MG: 5 INJECTION, SOLUTION INTRAVENOUS at 21:38

## 2023-10-04 RX ADMIN — ACETAMINOPHEN 975 MG: 325 TABLET, FILM COATED ORAL at 02:41

## 2023-10-04 RX ADMIN — ALBUTEROL SULFATE 2 PUFF: 90 AEROSOL, METERED RESPIRATORY (INHALATION) at 12:28

## 2023-10-04 RX ADMIN — MONTELUKAST 10 MG: 10 TABLET, FILM COATED ORAL at 20:23

## 2023-10-04 RX ADMIN — SENNOSIDES AND DOCUSATE SODIUM 2 TABLET: 50; 8.6 TABLET ORAL at 08:13

## 2023-10-04 RX ADMIN — NIFEDIPINE 60 MG: 30 TABLET, FILM COATED, EXTENDED RELEASE ORAL at 08:13

## 2023-10-04 RX ADMIN — ACETAMINOPHEN 975 MG: 325 TABLET, FILM COATED ORAL at 20:20

## 2023-10-04 RX ADMIN — ACETAMINOPHEN 975 MG: 325 TABLET, FILM COATED ORAL at 14:24

## 2023-10-04 RX ADMIN — NIFEDIPINE 60 MG: 30 TABLET, FILM COATED, EXTENDED RELEASE ORAL at 20:21

## 2023-10-04 RX ADMIN — IBUPROFEN 800 MG: 800 TABLET ORAL at 14:24

## 2023-10-04 RX ADMIN — LABETALOL HYDROCHLORIDE 20 MG: 5 INJECTION, SOLUTION INTRAVENOUS at 02:15

## 2023-10-04 RX ADMIN — CALCIUM CARBONATE (ANTACID) CHEW TAB 500 MG 1000 MG: 500 CHEW TAB at 08:17

## 2023-10-04 RX ADMIN — HYDRALAZINE HYDROCHLORIDE 10 MG: 20 INJECTION INTRAMUSCULAR; INTRAVENOUS at 21:58

## 2023-10-04 RX ADMIN — FLUTICASONE PROPIONATE 1 PUFF: 110 AEROSOL, METERED RESPIRATORY (INHALATION) at 20:20

## 2023-10-04 RX ADMIN — IBUPROFEN 800 MG: 800 TABLET ORAL at 20:20

## 2023-10-04 RX ADMIN — ACETAMINOPHEN 975 MG: 325 TABLET, FILM COATED ORAL at 08:13

## 2023-10-04 RX ADMIN — LABETALOL HYDROCHLORIDE 600 MG: 300 TABLET, FILM COATED ORAL at 12:05

## 2023-10-04 RX ADMIN — SERTRALINE HYDROCHLORIDE 100 MG: 100 TABLET ORAL at 20:21

## 2023-10-04 ASSESSMENT — ACTIVITIES OF DAILY LIVING (ADL)
ADLS_ACUITY_SCORE: 22

## 2023-10-04 NOTE — PROGRESS NOTES
Post  Anesthesia Follow Up Note    Patient: Selena Dutton    Patient location: Postpartum floor.      Procedure(s) Performed:  Procedure(s):   section    Anesthesia type: Spinal Block    Subjective:     Pain is well controlled     Additional ROS:  She does not complain of pruritis at this time. She denies weakness, denies paresthesia, denies difficulties breathing or voiding, denies nausea or vomiting. She is able to ambulate and tolerates regular diet.    Objective:  Vitals stable      Last Vitals: BP (!) 158/98   Pulse 80   Temp 37.2  C (98.9  F) (Oral)   Resp 16   Wt 74.1 kg (163 lb 4.8 oz)   LMP 2023 (Exact Date)   SpO2 96%   Breastfeeding Unknown   BMI 25.58 kg/m      Assessment and plan:   Selena Dutton is a 33 year old female  POD #1 s/p   and single shot TAP nerve block injections. There is no evidence of adverse side effects associated with spinal and nerve block injections.    Pain is well controlled.    Thank you for including us in the care for this patient.    Kody Rollins MD   CA-2  Anesthesia

## 2023-10-04 NOTE — PROGRESS NOTES
G3 call with update-    Ordering one time dose of Labetalol 400mg to give now.     Pt BP's now mild range (152/94) after third dose of IV labetalol (80mg). Will continue with algorithm BP checks.

## 2023-10-04 NOTE — PROGRESS NOTES
Postpartum Progress Note  S:  Patient sleeping soundly this AM. Per bedside RN, doing well from postoperative standpoint and meeting goals. SSR pressures overnight requiring BP med titration but was asymptomatic during that time.    O:  Patient Vitals for the past 24 hrs:   BP Temp Temp src Pulse Resp SpO2 Weight   10/04/23 0740 (!) 142/96 -- -- 79 -- 96 % --   10/04/23 0600 137/87 -- -- 84 -- -- --   10/04/23 0500 (!) 147/93 -- -- 87 -- -- --   10/04/23 0415 (!) 148/93 -- -- 85 -- -- --   10/04/23 0400 (!) 148/89 -- -- 76 -- -- --   10/04/23 0350 (!) 147/92 -- -- 77 -- -- --   10/04/23 0340 (!) 148/91 -- -- 79 -- -- --   10/04/23 0329 (!) 153/93 -- -- 77 -- -- --   10/04/23 0320 (!) 154/95 -- -- 76 -- -- --   10/04/23 0310 (!) 152/94 -- -- 78 -- -- --   10/04/23 0248 (!) 164/100 -- -- 75 16 -- --   10/04/23 0230 (!) 162/93 -- -- 75 -- -- --   10/04/23 0200 (!) 166/90 -- -- 78 -- -- --   10/04/23 0140 (!) 163/91 98.4  F (36.9  C) Oral 85 -- -- --   10/03/23 2215 -- -- -- -- -- -- 74.1 kg (163 lb 4.8 oz)   10/03/23 2100 (!) 143/87 -- -- 83 -- -- --   10/03/23 2000 135/83 99.2  F (37.3  C) Oral 83 16 -- --   10/03/23 1900 130/82 -- -- 75 16 98 % --   10/03/23 1812 130/84 -- -- 79 16 98 % --   10/03/23 1658 117/76 -- -- 79 16 97 % --   10/03/23 1604 107/79 98.4  F (36.9  C) Oral 78 16 97 % --   10/03/23 1500 104/76 -- -- 69 16 96 % --   10/03/23 1402 111/80 -- -- 62 14 94 % --   10/03/23 1301 105/74 -- -- 58 18 94 % --   10/03/23 1200 91/59 97.3  F (36.3  C) Oral 64 16 96 % --   10/03/23 1100 118/80 -- -- 60 18 95 % --   10/03/23 1000 117/86 -- -- 59 16 95 % --   10/03/23 0939 (!) 122/92 -- -- 59 18 94 % --   10/03/23 0802 (!) 129/94 97.9  F (36.6  C) Oral 64 16 95 % --       Gen: Resting comfortably, NAD  CV: RRR, no murmurs  Resp: Non-labored breathing, CTAB      HGB  Recent Labs   Lab 10/03/23  0758 10/02/23  1744 10/02/23  0833 10/01/23  1844   HGB 9.7* 10.2* 10.6* 10.1*       A/P:  Selena Dutton is a 33 year old   at 29w6d who is POD#2 s/p PLTCS for worsening blood pressures refractory to treatment in the setting of preeclampsia with severe features. Doing well from postoperative standpoint, working on BP control.    # Preeclampsia w/ severe features:  - BP's SSR overnight after down titrating PO antihypertensives due to low BPs, started back on regimen prior to delivery this evening  - Continue to monitor and treat sustained severe range per protocol  - S/p 24h mag  - D#4 60 mg BID Nifedipine, D#1 Labetalol 400 TID. Uptitrate PRN.  - Strict I&O  - AST 65 o/w HELLP labs nl, repeat ordered for this AM    # Postpartum Care  - Pain: Tylenol, ibuprofen.  - Heme: 10.2> EBL 1342 (TXA)> 9.7 c/w postoperative ABLA; will discharge with oral iron.  - GI: Regular diet. Bowel regimen. Antiemetics PRN  - Rh positive, Rubella immune    # Asthma  - PTA Albuterol, singulair, flovent continued in house    # JAQUELIN/MDD  - PTA Zoloft   - Mood stable  - SW consult PRN    Charlie Ghotra MD, MPH  Ob/Gyn Resident, PGY-3    Staff MD Note    I appreciate the note by Dr. Ghotra.  Any necessary changes have been made by me.  I saw and evaluated the patient and agree with the findings and plan of care as documented in the note.  Discussed continued management of blood pressures.  Currently no signs/symptoms of preeclampsia.  Discussed need for continued titration and inpatient observation of BP until we have her on stable oral antihypertensive with BP < 140/90 over 24 hours.  Patient expressed understanding of plan.    Natasha Del Angel MD

## 2023-10-04 NOTE — LACTATION NOTE
This note was copied from a baby's chart.    Lactation Admission Note      Baby Information:  Infant's first name:  Carmela    Infant medical history: Prematurity (29.6 weeks), RDS     needed? No    Lactation goal (if known): Would like to latch when Carmela is able    Lactation history: First baby    Mother's Information: Name: Selena   Occupation: Human Resources  Age: 33 years old  Delivery type:   on 10/2/23 at 2041  [place]: [place]  Pump for home use: Will check with insurance about [employer] rental    Partner's name: Shiv  Occupation:  at [employer]    Relevant maternal medical and social hx:       Information for the patient's mother:  Nato Selena MACHUCA [[id number]]     Patient Active Problem List   Diagnosis    Moderate persistent asthma without complication    Anxiety and depression    Supervision of high-risk pregnancy    Encounter for triage in pregnant patient    Severe hypertension affecting pregnancy in third trimester    Pre-eclampsia          Relevant maternal medications:   Was treated with Mag. Sulfate for 24 hours  Zoloft 100mg daily for anxiety/depression   10/4: Nifedipine and Labetalol for blood pressure control     Maternal risk factors:  Depression  Anxiety  Hypertension (details) : Preeclampsia     Admission Education given:  [x]Admission packet  [x]Kangaroo care  [x]Benefits of breast milk  [x]How breast milk is made  [x]Stages of milk production  [x]Milk supply/ goal volumes  [x]Hand expression  [x]Hands-on pumping  [x]Collecting, labeling, transporting milk  [x]Cleaning, sanitizing pump parts  [x]Storage of milk    Parents were able to start skin to skin last night. Selena is pumping every 3 hours and is getting about 1ml from each breast.    Encouraged Selena to log pumping times and volumes. Also encouraged Selena and Shiv to watch the Breastfeeding/Pumping videos on the [facility].     Tiffanie Hammond RN, IBCLC   Lactation Consultant  Ascom: *[phone]  Office: [phone]

## 2023-10-04 NOTE — PROVIDER NOTIFICATION
10/04/23 0206   Provider Notification   Provider Name/Title G2-Wes   Method of Notification Electronic Page   Request Evaluate-Remote   Notification Reason Vital Signs Change     Hello, pt has two severe range BP's 166/90 was the last check.  Please advise.     Provider- treat BP's with IV labetalol

## 2023-10-04 NOTE — PLAN OF CARE
Goal Outcome Evaluation:      Plan of Care Reviewed With: patient, spouse        One BP severe range, unsustained.  Other BPs mild range and Labetalol dose increased to 600mg q8h.  Patellar reflexes brisk to normal, no clonus, edema remains about the same in BLEs.  Incision covered with dressing CDI, declines to take shower for now and remove dressing but discussed removing in shower.  Pumping and getting puddles of colostrum.  Pain managed with PO meds.  Voiding good amounts.  Continue with POC.

## 2023-10-04 NOTE — PROVIDER NOTIFICATION
10/04/23 1605   Vital Signs   Temp 98.8  F (37.1  C)   Temp src Oral   Resp 16   Pulse 89   Pulse Rate Source Monitor   BP (!) 152/92   Oxygen Therapy   O2 Device None (Room air)     Updated G2 Gwan of elevated BP.

## 2023-10-04 NOTE — PROGRESS NOTES
[facility]  Magnesium Check Note    S:   Patient is feeling well, much better since dose reduction of magnesium to 1 g/h.  Denies headache, vision changes/spots in vision, chest pain, shortness of breath, RUQ or epigastric pain. Excited to be done soon with magnesium.    O:  Patient Vitals for the past 4 hrs:   BP Temp Temp src Pulse Resp SpO2   10/03/23 1900 130/82 -- -- 75 16 98 %   10/03/23 1812 130/84 -- -- 79 16 98 %   10/03/23 1658 117/76 -- -- 79 16 97 %   10/03/23 1604 107/79 98.4  F (36.9  C) Oral 78 16 97 %     Gen: Resting comfortably in bed  CV:  RRR, no murmurs  Pulm:  CTAB, no wheezes or crackles  Abd:  Soft, non-tender  Ext:  Patellar and brachioradialis reflexes 1+ b/l, 1 beat clonus b/l, 1+ LE edema b/l    I/O:  I/O last 3 completed shifts:  In: 2365 [P.O.:1500; I.V.:865]  Out: 3532 [Urine:2190; Blood:1342]    A/P:  Selena Dutton is a 33 year old  on PPD#1 s/p PLTCS, with pregnancy complicated by preeclampsia with severe features.    Preeclampsia with severe features  - BP: somewhat labile, had been borderline hypotensive at 91/59 so labetalol dose reduced today  - D#3 60 BID, S/p 1 D Labetalol 400 TID> D#1 200 TID   - UOP: adequate, strict Is/Os.   - Symptoms: Denies  - HELLP labs  - Mag sulfate for seizure prophylaxis: 4g> 2g/h  - Magnesium off time     Postpartum:  routine postpartum care    Alexx Harvey MD  OB/GYN PGY-2  10/03/2023 7:31 PM

## 2023-10-04 NOTE — PROVIDER NOTIFICATION
10/04/23 0800 10/04/23 0900   Vital Signs   Temp  --  98.9  F (37.2  C)   Temp src  --  Oral   Resp  --  16   Pulse 85 80   Pulse Rate Source Monitor Monitor   BP (!) 148/94 (!) 158/98     Updated G2 Gwan of elevated BPs.

## 2023-10-04 NOTE — PLAN OF CARE
Goal Outcome Evaluation:         Data: Pt BP reached severe range, IV labetalol was given x3, oral labetalol 400mg one time dose given per provider order. Postpartum assessments WNL. She is voiding without difficulty, up ad bre, eating and drinking without nausea. Incision appears to be healing well, lochia WNL, no s/s infection. Mag stopped and gomez removed-See flowsheet and MAR. Pumping for infant in NICU. Taking tylenol and ibuprofen for  pain and Pt reports good pain management.   Action: Education provided on discharge goals, plan of care, pain management, and HTN algorithm.   Response: Pt is agreeable with her plan of care. Support person, Shiv present. Will continue with plan of care and notify provider of any changes in status.

## 2023-10-05 LAB — PLATELET # BLD AUTO: 191 10E3/UL (ref 150–450)

## 2023-10-05 PROCEDURE — 250N000011 HC RX IP 250 OP 636: Mod: JZ

## 2023-10-05 PROCEDURE — 250N000013 HC RX MED GY IP 250 OP 250 PS 637

## 2023-10-05 PROCEDURE — 36415 COLL VENOUS BLD VENIPUNCTURE: CPT

## 2023-10-05 PROCEDURE — 120N000002 HC R&B MED SURG/OB UMMC

## 2023-10-05 PROCEDURE — 99221 1ST HOSP IP/OBS SF/LOW 40: CPT | Performed by: INTERNAL MEDICINE

## 2023-10-05 PROCEDURE — 250N000013 HC RX MED GY IP 250 OP 250 PS 637: Performed by: OBSTETRICS & GYNECOLOGY

## 2023-10-05 PROCEDURE — 99232 SBSQ HOSP IP/OBS MODERATE 35: CPT | Mod: GC | Performed by: OBSTETRICS & GYNECOLOGY

## 2023-10-05 PROCEDURE — 85049 AUTOMATED PLATELET COUNT: CPT

## 2023-10-05 RX ORDER — HYDROCHLOROTHIAZIDE 25 MG/1
25 TABLET ORAL DAILY
Status: DISCONTINUED | OUTPATIENT
Start: 2023-10-05 | End: 2023-10-07 | Stop reason: HOSPADM

## 2023-10-05 RX ORDER — HYDRALAZINE HYDROCHLORIDE 10 MG/1
20 TABLET, FILM COATED ORAL 4 TIMES DAILY
Status: DISCONTINUED | OUTPATIENT
Start: 2023-10-05 | End: 2023-10-07 | Stop reason: HOSPADM

## 2023-10-05 RX ORDER — HYDRALAZINE HYDROCHLORIDE 10 MG/1
10 TABLET, FILM COATED ORAL 4 TIMES DAILY
Status: DISCONTINUED | OUTPATIENT
Start: 2023-10-05 | End: 2023-10-05

## 2023-10-05 RX ADMIN — SERTRALINE HYDROCHLORIDE 100 MG: 100 TABLET ORAL at 19:55

## 2023-10-05 RX ADMIN — NIFEDIPINE 60 MG: 30 TABLET, FILM COATED, EXTENDED RELEASE ORAL at 09:14

## 2023-10-05 RX ADMIN — IBUPROFEN 800 MG: 800 TABLET ORAL at 09:14

## 2023-10-05 RX ADMIN — FLUTICASONE PROPIONATE 1 PUFF: 110 AEROSOL, METERED RESPIRATORY (INHALATION) at 19:51

## 2023-10-05 RX ADMIN — ACETAMINOPHEN 975 MG: 325 TABLET, FILM COATED ORAL at 16:46

## 2023-10-05 RX ADMIN — ALBUTEROL SULFATE 2 PUFF: 90 AEROSOL, METERED RESPIRATORY (INHALATION) at 09:00

## 2023-10-05 RX ADMIN — NIFEDIPINE 60 MG: 30 TABLET, FILM COATED, EXTENDED RELEASE ORAL at 19:53

## 2023-10-05 RX ADMIN — HYDRALAZINE HYDROCHLORIDE 10 MG: 10 TABLET ORAL at 04:09

## 2023-10-05 RX ADMIN — HYDRALAZINE HYDROCHLORIDE 10 MG: 20 INJECTION INTRAMUSCULAR; INTRAVENOUS at 00:13

## 2023-10-05 RX ADMIN — HYDRALAZINE HYDROCHLORIDE 20 MG: 10 TABLET ORAL at 19:52

## 2023-10-05 RX ADMIN — LABETALOL HYDROCHLORIDE 800 MG: 300 TABLET, FILM COATED ORAL at 06:04

## 2023-10-05 RX ADMIN — SENNOSIDES AND DOCUSATE SODIUM 1 TABLET: 50; 8.6 TABLET ORAL at 09:13

## 2023-10-05 RX ADMIN — ALBUTEROL SULFATE 2 PUFF: 90 AEROSOL, METERED RESPIRATORY (INHALATION) at 21:16

## 2023-10-05 RX ADMIN — ENOXAPARIN SODIUM 40 MG: 40 INJECTION SUBCUTANEOUS at 13:45

## 2023-10-05 RX ADMIN — ACETAMINOPHEN 975 MG: 325 TABLET, FILM COATED ORAL at 09:13

## 2023-10-05 RX ADMIN — FLUTICASONE PROPIONATE 1 PUFF: 110 AEROSOL, METERED RESPIRATORY (INHALATION) at 09:22

## 2023-10-05 RX ADMIN — LABETALOL HYDROCHLORIDE 800 MG: 300 TABLET, FILM COATED ORAL at 21:55

## 2023-10-05 RX ADMIN — IBUPROFEN 800 MG: 800 TABLET ORAL at 03:02

## 2023-10-05 RX ADMIN — HYDROCHLOROTHIAZIDE 25 MG: 25 TABLET ORAL at 10:23

## 2023-10-05 RX ADMIN — HYDRALAZINE HYDROCHLORIDE 10 MG: 10 TABLET ORAL at 13:42

## 2023-10-05 RX ADMIN — ACETAMINOPHEN 975 MG: 325 TABLET, FILM COATED ORAL at 03:02

## 2023-10-05 RX ADMIN — LABETALOL HYDROCHLORIDE 800 MG: 300 TABLET, FILM COATED ORAL at 14:05

## 2023-10-05 RX ADMIN — ACETAMINOPHEN 975 MG: 325 TABLET, FILM COATED ORAL at 22:45

## 2023-10-05 RX ADMIN — MONTELUKAST 10 MG: 10 TABLET, FILM COATED ORAL at 19:53

## 2023-10-05 ASSESSMENT — ACTIVITIES OF DAILY LIVING (ADL)
ADLS_ACUITY_SCORE: 22

## 2023-10-05 NOTE — PROVIDER NOTIFICATION
10/04/23 2213   Vital Signs   BP (!) 150/86  (after first dose of IV hydralazine)     Paged Dr Forman about this BP after IV hydralazine administration

## 2023-10-05 NOTE — PROVIDER NOTIFICATION
10/04/23 2149   Vital Signs   Pulse 91   BP (!) 163/104  (will give hydralazine now)     Spoke with Dr. Charlie Ghotra on the phone about this sustained SR pressure. Agree to give IV hydralazine and follow protocol. Will update if still severe range after this dose.

## 2023-10-05 NOTE — PROVIDER NOTIFICATION
10/05/23 0000   Vital Signs   Pulse 85   BP (!) 161/96     /98, recheck 161/96. Pt would like to discuss plan with you before more IV meds because she feels like her asthma is getting worse and she is feeling more constricted with her breathing. Please advise.     Per Dr. Forman, give her IV hydralazine and follow protocol.     Update team if pt continues to sustain severe range BP, ICU and/or internal medicine will be consulted.

## 2023-10-05 NOTE — PROGRESS NOTES
Postpartum Progress Note    S: Patient feeling well this morning. Overwhelmed by the elevated Bps but asymptomatic. Pain well controlled. Bleeding appropriate. Eating and drinking. No N/V. No headache, chest pain, SOB, RUQ pain, vision changes. Passing flatus. Hoping to avoid the ICU.    O:  Patient Vitals for the past 24 hrs:   BP Temp Temp src Pulse Resp Weight   10/05/23 0701 (!) 152/98 -- -- 90 -- --   10/05/23 0612 -- -- -- -- -- 70.4 kg (155 lb 3.2 oz)   10/05/23 0603 (!) 149/98 -- -- 84 -- --   10/05/23 0502 (!) 156/98 -- -- 86 -- --   10/05/23 0402 (!) 153/94 98.2  F (36.8  C) Oral 86 16 --   10/05/23 0300 (!) 153/98 -- -- 87 -- --   10/05/23 0234 (!) 153/98 -- -- 88 -- --   10/05/23 0200 (!) 149/96 -- -- 95 -- --   10/05/23 0143 (!) 144/89 -- -- 98 -- --   10/05/23 0126 (!) 152/91 -- -- 95 -- --   10/05/23 0110 (!) 143/99 -- -- 99 -- --   10/05/23 0100 (!) 145/87 -- -- 97 -- --   10/05/23 0048 (!) 156/88 -- -- 93 -- --   10/05/23 0038 (!) 151/89 -- -- 93 -- --   10/05/23 0025 (!) 145/89 -- -- 93 -- --   10/05/23 0015 (!) 158/92 -- -- 88 -- --   10/05/23 0000 (!) 161/96 -- -- 85 -- --   10/04/23 2349 (!) 160/98 -- -- 88 -- --   10/04/23 2315 (!) 155/98 -- -- 95 -- --   10/04/23 2300 (!) 154/97 -- -- 94 -- --   10/04/23 2245 (!) 149/92 -- -- 96 -- --   10/04/23 2232 (!) 158/95 -- -- 98 -- --   10/04/23 2213 (!) 150/86 -- -- -- -- --   10/04/23 2149 (!) 163/104 -- -- 91 -- --   10/04/23 2122 (!) 164/100 -- -- 89 -- --   10/04/23 2106 (!) 162/97 -- -- -- -- --   10/04/23 2042 (!) 169/97 -- -- 86 -- --   10/04/23 2027 (!) 162/97 98.5  F (36.9  C) Oral 87 16 --   10/04/23 1605 (!) 152/92 98.8  F (37.1  C) Oral 89 16 --   10/04/23 1226 (!) 156/93 -- -- 88 -- --   10/04/23 1206 (!) 160/102 -- -- 81 -- --   10/04/23 0900 (!) 158/98 98.9  F (37.2  C) Oral 80 16 --   10/04/23 0800 (!) 148/94 -- -- 85 -- --         Gen: Resting comfortably, NAD  CV: RRR, no murmurs  Resp: Non-labored breathing, CTAB  Abdomen: soft, FF,  appropriately tender.  Inc c/d/i  Extr-NT, 1+ edema michelle    HGB  Recent Labs   Lab 10/04/23  2225 10/04/23  0744 10/03/23  0758 10/02/23  1744   HGB 9.5* 10.1* 9.7* 10.2*         A/P:  Selena Dutton is a 33 year old  at 29w6d who is POD#3 s/p PLTCS for worsening blood pressures refractory to treatment in the setting of preeclampsia with severe features. Doing well from postoperative standpoint, working on BP control.    # Preeclampsia w/ severe features:  - BP's SSR overnight after down titrating PO antihypertensives due to low BPs, started back on regimen prior to delivery this evening  - Continue to monitor and treat sustained severe range per protocol  - D#5 60 Nifed BID, D#1 labetalol 800 TID, D#1 Hydral 10 QID  - s/p 24h Mg  - AST 35> 65>24; HELLP wnl (10/4)  - IV labet 20, 40, 80 (0215, 0233, 0254) 20,40,80 (2050,2109,2138) hydral 10 (2158), 10 (0013)  - Strict I&O  - AST 65 o/w HELLP labs nl, repeat yesterday normal  - Nephrology consult ordered for refractory HTN    # Postpartum Care  - Pain: Tylenol, ibuprofen.  - Heme: 10.2> EBL 1342 (TXA)> 9.7 c/w postoperative ABLA; will discharge with oral iron.  - GI: Regular diet. Bowel regimen. Antiemetics PRN  - Rh positive, Rubella immune  - Plans to remove bandage with shower today    # Asthma  - PTA Albuterol, singulair, flovent continued in house    # JAQUELIN/MDD  - PTA Zoloft   - Mood stable  - SW consult PRN    Charlie Ghotra MD, MPH  Ob/Gyn Resident, PGY-3    BP (!) 155/99 (BP Location: Right arm, Patient Position: Semi-Mccarty's, Cuff Size: Adult Regular)   Pulse 88   Temp 99  F (37.2  C) (Oral)   Resp 16   Wt 70.4 kg (155 lb 3.2 oz)   LMP 2023 (Exact Date)   SpO2 96%   Breastfeeding Unknown   BMI 24.31 kg/m      Hemoglobin   Date Value Ref Range Status   10/04/2023 9.5 (L) 11.7 - 15.7 g/dL Final     Weight 155 lbs (163 lbs on 10/3)    The patient was seen and examined by me separately from the team.  I have reviewed and agree with the above  note.  She is feeling very well today, no new concerns.  Reviewed plan to consult nephrology given continued difficulty controlling BP.  Will add hydrochlorothiazide 25 mg today given that the is likely still fluid up from pregnancy and surgery.  Continue inpatient care until BP is under acceptable control.     Mary Jane Cervantes MD, FACOG

## 2023-10-05 NOTE — PROGRESS NOTES
Postpartum Progress Note    S: Patient feeling well this AM.  No HA, vision changes, RUQ pain.  Baby doing well in NICU.   Meeting post op goals.  Pumping successfully.   Working on BP control. P    O:  Patient Vitals for the past 24 hrs:   BP Temp Temp src Pulse Resp Weight   10/06/23 0645 (!) 143/96 -- -- 83 -- --   10/06/23 0630 (!) 159/98 -- -- 77 -- --   10/06/23 0617 (!) 159/105 -- -- 77 18 67.1 kg (148 lb)   10/06/23 0603 (!) 162/104 -- -- 78 16 --   10/06/23 0207 (!) 153/96 98.6  F (37  C) Oral 87 16 --   10/05/23 2155 (!) 146/90 -- -- 96 -- --   10/05/23 1957 (!) 151/94 98.6  F (37  C) Oral 97 16 --   10/05/23 1739 (!) 149/92 98.9  F (37.2  C) Oral -- 15 --   10/05/23 1359 (!) 159/92 -- -- 93 -- --   10/05/23 1342 (!) 163/101 -- -- -- -- --       Gen: Resting comfortably, NAD  CV: RRR, no murmurs  Resp: Non-labored breathing, CTAB    HGB  Recent Labs   Lab 10/04/23  2225 10/04/23  0744 10/03/23  0758 10/02/23  1744   HGB 9.5* 10.1* 9.7* 10.2*       A/P:  Selena Dutton is a 33 year old  at 29w6d who is POD#4 s/p PLTCS for worsening blood pressures refractory to treatment in the setting of preeclampsia with severe features. Doing well from postoperative standpoint, working on BP control.    # Preeclampsia w/ severe features:  - BP's consistently high MR overnight   - Continue to monitor and treat sustained severe range per protocol  - D#6 60 Nifed BID, D#2 labetalol 800 TID, Hydral 20 QID, HCTZ 25 daily   - Consider additional agent such as enalapril  in house  - s/p 24h Mg  - AST 35> 65>24; HELLP wnl (10/4)  - IV labet 20, 40, 80 (0215, 0233, 0254) 20,40,80 (,210,213) hydral 10 (), 10 (3)  - Strict I&O  - AST 65 o/w HELLP labs nl, repeat yesterday normal.  Repeat this AM given persistently elevated BP  - Nephrology consult ordered for refractory HTN, recommended avoiding NSAIDs (currently held)    # Postpartum Care  - Pain: Tylenol, PRN oxy  - Heme: 10.2> EBL 1342 (TXA)> 9.7> 9.5 c/w  postoperative ABLA; will discharge with oral iron.  - GI: Regular diet. Bowel regimen. Antiemetics PRN  - Rh positive, Rubella immune      # Asthma  - PTA Albuterol, singulair, flovent continued in house    # JAQUELIN/MDD  - PTA Zoloft   - Mood stable  - SW consult PRN    Dispo: Anticipate discontinue POD#5-6 pending BP control.    Charlie Ghotra MD, MPH  Ob/Gyn Resident, PGY-3  10/06/23 6:09 AM     Physician Attestation   I saw this patient with the resident and agree with the resident/fellow's findings and plan of care as documented in the note.      Key findings: BP in mild range this AM.  Will continue to closely monitor.     Natasha Mitchell MD  Date of Service (when I saw the patient): 10/06/23     I spent a total of 20 minutes on the date of this encounter including preparing to see the patient (reviewing medical records/tests), counseling and discussing the plan of care, documenting the visit in the electronic medical record, and communicating with other health care professionals and/or care coordination.

## 2023-10-05 NOTE — PROVIDER NOTIFICATION
10/05/23 1418   Provider Notification   Provider Name/Title G2 Gwan   Method of Notification Electronic Page   Request Evaluate-Remote   Notification Reason Vital Signs Change  (PT BP was 163/101, 15 min recheck 159/92. Scheduled PO labetalol just given)

## 2023-10-05 NOTE — PROGRESS NOTES
Nephrology Initial Consult  2023      Selena Dutton MRN:[id number] YOB: 1990  Date of Admission:2023  Primary care provider: [facility]  Requesting physician: Hilary Omalley MD    ASSESSMENT AND RECOMMENDATIONS:   #Elevated BP Post partum:  Clinical picture is rather suggestive of preeclampsia than HTN secondary to renal disease, especially that her creatinine is rather at her baseline. Her proteinuria is explained by her preeclampsia and her hematuria by post-partum vaginal bleed. The possibility of Pregnancy-related renal TMA is less likely. She does have some anemia and thrombocytopaenia but both are stable and can be explained by blood loss after delivery and preeclampsia respectively. Besides, there is no evidence of hemolysis(normal bilirubin). She is completely asymptomatic  - Currently on Nifedipine 60 mg BID; Labetalol 400 TID and hydrochlorothiazide 25 mg added today with PRN hydralazine. Her BP today has ulises ~155/90.   -Please avoid NSAIDs    Recommendations were communicated to primary team verbally and via this note    WILLIAM HEART MD   Division of Renal Disease and Hypertension  [facility]  myairmail  Vocera Web Console      REASON FOR CONSULT: HTN    HISTORY OF PRESENT ILLNESS:  Admitting provider and nursing notes reviewed  Selena Dutton is a 32 year old . Her pregnancy was complicated by Preeclampsia at 29wk 6 day, with severe features by blood pressure criteria and proteinuria. Her course was also complicated by starvation ketosis. Initially admitted to the ICU, transferred to the antepartum unit 10/4/2023. She delivered a baby boy, now day 3 post partum. Her BP continues to be elevated so nephrology were consulted for further management and to r/o secondary causes of HTN.  Mom and baby are doing ok. She is breast feeding. Family by bedside  She is asymptomatic. No headache, visual changes. LE edema much beter. She is ambulating    "    PAST MEDICAL HISTORY:  Reviewed with patient on 10/05/2023     Past Medical History:   Diagnosis Date    Anxiety and depression     Moderate persistent asthma without complication        Past Surgical History:   Procedure Laterality Date     SECTION N/A 10/2/2023    Procedure:  section;  Surgeon: Khadijah Mckeon MD;  Location:  L+D     TOOTH EXTRACTION W/FORCEP      MOUTH SURGERY          MEDICATIONS:  PTA Meds  Prior to Admission medications    Medication Sig Last Dose Taking? Auth Provider Long Term End Date   albuterol (PROAIR HFA/PROVENTIL HFA/VENTOLIN HFA) 108 (90 Base) MCG/ACT inhaler Inhale 2 puffs into the lungs every 6 hours   Hazel Ghotra MD Yes    fluticasone (FLOVENT HFA) 110 MCG/ACT inhaler Inhale 1 puff into the lungs 2 times daily   Hazel Ghotra MD Yes    montelukast (SINGULAIR) 10 MG tablet Take 1 tablet (10 mg) by mouth At Bedtime   aSry Meyer APRN CNP Yes    Prenat w/o A-WU-Cwykfnj-FA-DHA (PNV-DHA PO) Take 1 tablet by mouth daily   Reported, Patient     sertraline (ZOLOFT) 100 MG tablet Take 1 tablet (100 mg) by mouth daily   Sary Meyer APRN CNP Yes       Current Meds   acetaminophen  975 mg Oral Q6H    albuterol  2 puff Inhalation Q6H    calcium carbonate  1,000 mg Oral TID    enoxaparin ANTICOAGULANT  40 mg Subcutaneous Q24H    fluticasone  1 puff Inhalation BID    hydrALAZINE  10 mg Oral 4x Daily    hydrochlorothiazide  25 mg Oral Daily    ibuprofen  800 mg Oral Q6H    labetalol  800 mg Oral Q8H ESTEVAN    montelukast  10 mg Oral At Bedtime    montelukast  10 mg Oral At Bedtime    NIFEdipine ER OSMOTIC  60 mg Oral BID    senna-docusate  1 tablet Oral BID    Or    senna-docusate  2 tablet Oral BID    sertraline  100 mg Oral Daily    sodium chloride (PF)  3 mL Intracatheter Q8H     Infusion Meds   BUPivacaine liposome (EXPAREL) LA inj was given in the infiltration site to produce post-op analgesia. Duration of action is up to 72 hours. Other \"delfino\" meds should " not be given for 96 hours except for lidocaine 4% patch. This is for INFORMATION ONLY.      dextrose 5% in lactated ringers 75 mL/hr at 10/03/23 0808    lactated ringers 100 mL/hr at 10/03/23 1612    - MEDICATION INSTRUCTIONS -      - MEDICATION INSTRUCTIONS -      oxytocin in 0.9% NaCl      oxytocin in 0.9% NaCl         ALLERGIES:    Allergies   Allergen Reactions    Nuts Anaphylaxis    Cat Hair Extract     Peanut (Diagnostic)        REVIEW OF SYSTEMS:  A comprehensive of systems was negative except as noted above.    SOCIAL HISTORY:   Social History     Socioeconomic History    Marital status:      Spouse name: Jhon    Number of children: Not on file    Years of education: Not on file    Highest education level: Not on file   Occupational History    Occupation: Human Resources   Tobacco Use    Smoking status: Never    Smokeless tobacco: Never   Vaping Use    Vaping Use: Never used   Substance and Sexual Activity    Alcohol use: Not Currently     Comment: 3 drinks/week    Drug use: Never    Sexual activity: Yes     Partners: Male     Birth control/protection: None   Other Topics Concern    Not on file   Social History Narrative    Works for a [employer] in .     Lives in [place] with her  and dog.     Bought a house in [facility] this Spring, closes in 2 weeks.      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         Free time: puppy shauna named Michelle.         Sary Meyer, DNP, APRN, CNP    06/01/22     Social Determinants of Health     Financial Resource Strain: Not on file   Food Insecurity: Not on file   Transportation Needs: Not on file   Physical Activity: Not on file   Stress: Not on file   Social Connections: Not on file   Interpersonal Safety: Not on file   Housing Stability: Not on file     Reviewed with patient 10/5/2023   and mother accompanies Selena Dutton in hospital room    FAMILY MEDICAL HISTORY:   Family History   Problem Relation Age of Onset     Osteoporosis Mother     Heart Disease Father     No Known Problems Brother     Cholelithiasis Maternal Grandmother     Myocardial Infarction Maternal Grandfather     Skin Cancer Paternal Grandmother     Diabetes Type 2  Paternal Grandfather     Transient ischemic attack Maternal Uncle      No Family history of kidney disease, or preeclampsia  Reviewed with patient     PHYSICAL EXAM:   Temp  Av.4  F (36.9  C)  Min: 97.3  F (36.3  C)  Max: 99.2  F (37.3  C)      Pulse  Av.6  Min: 58  Max: 118 Resp  Av.3  Min: 6  Max: 33  SpO2  Av.9 %  Min: 93 %  Max: 100 %       BP (!) 155/99 (BP Location: Right arm, Patient Position: Semi-Mccarty's, Cuff Size: Adult Regular)   Pulse 88   Temp 99  F (37.2  C) (Oral)   Resp 16   Wt 70.4 kg (155 lb 3.2 oz)   LMP 2023 (Exact Date)   SpO2 96%   Breastfeeding Unknown   BMI 24.31 kg/m     Date 10/05/23 0700 - 10/06/23 0659   Shift 1995-5164 [id number] 2787-3757 24 Hour Total   INTAKE   P.O. 600   600   Shift Total(mL/kg) 600(8.52)   600(8.52)   OUTPUT   Urine 200   200   Shift Total(mL/kg) 200(2.84)   200(2.84)   Weight (kg) 70.4 70.4 70.4 70.4      Admit Weight: 75.2 kg (165 lb 12.6 oz)     GENERAL APPEARANCE: no  distress,  awake  EYES: no scleral icterus, pupils equal  Lymphatics: no cervical or supraclavicular LAD  Pulmonary: lungs clear to auscultation with equal breath sounds bilaterally, no clubbing  CV: regular rhythm, normal rate, no rub   - JVD none   - Edema trace  GI: soft, nontender, normal bowel sounds  MS: no evidence of inflammation in joints, no muscle tenderness  : no gomez  SKIN: no rash, warm, dry, no cyanosis  NEURO: face symmetric, no asterixis     LABS:   I have reviewed the following labs:  CMP  Recent Labs   Lab 10/04/23  2225 10/04/23  0744 10/03/23  0758 10/02/23  1744 10/02/23  0906 10/02/23  0833 10/01/23  2204 10/01/23  1844 10/01/23  1129 10/01/23  1030 10/01/23  0527 10/01/23  0322     --   --  137  --  138  --  133*   --  133*  --  129*   POTASSIUM 3.8  --   --  4.4  --  4.2  --  4.1  --  4.1  --  3.6  3.6   CHLORIDE 104  --   --  104  --  106  --  102  --  101  --  98   CO2 21*  --   --  22  --  21*  --  20*  --  17*  --  16*   ANIONGAP 10  --   --  11  --  11  --  11  --  15  --  15   *  --   --  89 79 84   < > 135*   < > 131*   < > 175*   BUN 18.3  --   --  15.5  --  14.2  --  10.0  --  6.1  --  5.4*   CR 0.82 0.86 0.92 0.80  --  0.77  --  0.79  --  0.69  --  0.72   GFRESTIMATED >90 >90 84 >90  --  >90  --  >90  --  >90  --  >90   BERNARDA 8.8  --   --  8.7  --  8.0*  --  7.2*  --  6.3*  --  5.5*   MAG  --   --  8.4*  --   --   --   --   --   --  5.1*  --  8.3*   PHOS  --   --   --   --   --   --   --   --   --   --   --  3.0   PROTTOTAL 5.2*  --   --  5.7*  --  6.0*  --  5.7*  --   --   --  5.8*   ALBUMIN 2.8*  --   --  3.2*  --  3.3*  --  3.2*  --   --   --  3.1*   BILITOTAL <0.2  --   --  0.2  --  0.2  --  0.2  --   --   --  0.2   ALKPHOS 106*  --   --  110*  --  117*  --  113*  --   --   --  125*   AST 24 42 65* 35  --  34  --  30  --   --   --  39   ALT 28 36 39 30  --  30  --  27  --   --   --  31    < > = values in this interval not displayed.     CBC  Recent Labs   Lab 10/05/23  0741 10/04/23  2225 10/04/23  0744 10/03/23  0758 10/02/23  1744 10/02/23  0833   HGB  --  9.5* 10.1* 9.7* 10.2* 10.6*   WBC  --  11.1* 10.2  --  12.2* 13.3*   RBC  --  3.09* 3.39*  --  3.38* 3.54*   HCT  --  28.1* 31.4*  --  30.5* 31.9*   MCV  --  91 93  --  90 90   MCH  --  30.7 29.8  --  30.2 29.9   MCHC  --  33.8 32.2  --  33.4 33.2   RDW  --  14.1 14.2  --  14.6 14.6    183 181 177 181 183     INR  Recent Labs   Lab 10/02/23  1744   INR 0.95   PTT 23     ABG  Recent Labs   Lab 10/01/23  1030 10/01/23  0322 09/30/23  1035   O2PER 18 21 0      URINE STUDIES  Recent Labs   Lab Test 05/17/23  1359   COLOR Yellow   APPEARANCE Clear   URINEGLC Negative   URINEBILI Negative   URINEKETONE Trace*   SG 1.025   UBLD Negative   URINEPH 5.5    PROTEIN Negative   UROBILINOGEN 1.0   NITRITE Negative   LEUKEST Trace*   RBCU 2-5*   WBCU 0-5     No lab results found.  PTH  No lab results found.  IRON STUDIES  Recent Labs   Lab Test 10/02/23  0833   CHIP 56       IMAGING:  All imaging studies reviewed by me.     WILLIAM HEART MD

## 2023-10-05 NOTE — PROVIDER NOTIFICATION
10/05/23 1722   Provider Notification   Provider Name/Title Dr. Cervantes   Method of Notification Electronic Page   Request Evaluate-Remote   Notification Reason Other     FYI New order entered for discharge rental Symphony breast pump. Please sign when able. New process requested by [facility]

## 2023-10-05 NOTE — PROVIDER NOTIFICATION
10/05/23 0300   Vital Signs   Pulse 87   Pulse Rate Source Monitor   BP (!) 153/98   BP Location Right arm   Patient Position Semi-Mccarty's   Cuff Size Adult Regular     Last 2 BPs were 153/98. Checking them hourly now per the protocol and patient preference. Just updating you, thanks!     Update: spoke to Dr. Forman. Plan to start PO hydralazine 10mg this morning and see if pt responds to this better. Team will reassess medication regimen this morning if needed.

## 2023-10-05 NOTE — PLAN OF CARE
Goal Outcome Evaluation:    VSS ex for elevated BP. Pt sustained severe range pressures throughout shift and was treated with 20 mg, 40 mg, 80 mg of IV labetalol as well as with 10 mg of IV hydralazine X 2. Pt seems to respond a little bit better to hydralazine but is maintaining high MR pressures. PO hydralazine 10 mg QID added to medication regimen which already includes 60 mg BID of nifedipine and 800 mg TID of labetalol. Pt agreeable to plan as she is really hoping to avoid an ICU readmission. Pt did not get sleep overnight but is still remaining quite optimistic and motivated despite her blood pressures being so labile.     PP assessments are WDL. Incision is covered with dressing, CDI. Pt is hoping to shower today and be able to remove dressing. Incisional pain being managed with tylenol and ibuprofen, knows she has oxycodone available. She is also using a belly binder for support. She is pumping for baby who is in the NICU. Quite excited about baby being extubated today and getting to visit him. , Shiv, is present and very supportive.     Nephrology consult ordered.    Continue to assess and assist as needed per POC.       Problem: Hypertension Acute  Goal: Blood Pressure Within Desired Range  Outcome: Not Progressing  Intervention: Normalize Blood Pressure  Recent Flowsheet Documentation  Taken 10/5/2023 0025 by Chiquita Westbrook RN  Medication Review/Management:   medications reviewed   dosing adjusted   high-risk medications identified

## 2023-10-05 NOTE — PROVIDER NOTIFICATION
10/04/23 2047   Provider Notification   Provider Name/Title Dr. Forman   Method of Notification In Department   Request Evaluate in Person   Notification Reason Vital Signs Change     Dr. Forman notified in person about sustained SR blood pressures. Plan to give IV labetalol and follow protocol. Update MD if BP not responding and will repeat HELLP labs as indicated

## 2023-10-05 NOTE — PLAN OF CARE
Pt VSS ex elevated Bp, postpartum assessment WDL.  Pain managed with tylenol and ibuprofen.  Pt is pumping frequently for baby in NICU and visiting NICU frequently. Pt showered and removed c/s dressing.  C/s incision VENKATESH and healing well.  Pt is ambulating independently, voiding spontaneously, and tolerating regular diet.  Continue with plan of care.

## 2023-10-05 NOTE — PROVIDER NOTIFICATION
10/04/23 2122   Vital Signs   Pulse 89   BP (!) 164/100     Spoke with Dr Forman about SR and no IV access. If no IV access is obtained, follow [facility] IR protocol and update provider

## 2023-10-05 NOTE — PROVIDER NOTIFICATION
10/04/23 2130   Provider Notification   Provider Name/Title G2   Method of Notification Electronic Page   Request Evaluate-Remote   Notification Reason Status Update     Pt still sustained /100. Still waiting on new IV. Due to asthma and overall labetalol for today, should I give her the 80 mg or should I switch to IV hydralazine? Please call back to discuss thanks.

## 2023-10-05 NOTE — PROGRESS NOTES
Brief Progress note:    Went up to bedside for persistent sustained SR BP. Patient is not reporting any symptoms at this time       10/4/2023  11:49 PM 10/5/2023  12:00 AM 10/5/2023  12:15 AM 10/5/2023  12:25 AM 10/5/2023  12:38 AM   Vital Signs        Systolic 160 !  161 !  158 !  145 !  151 !    Diastolic 98 (H)  96 (H)  92 (H)  89 !  89 !    Pulse 88  85  88  93  93       10/5/2023  12:48 AM   Vital Signs    Systolic 156 !    Diastolic 88 !    Pulse 93                A/P:  Selena Dutton is a 33 year old  at 29w6d who is POD#2 s/p PLTCS for worsening blood pressures refractory to treatment in the setting of preeclampsia with severe features. Doing well from postoperative standpoint, working on BP control.     # Preeclampsia w/ severe features:  - continued sustained SR BP that are not responsive to labetolol IV. Recommended starting with hydralyzine IV protocol for SSRBP. Discussed that we are at the maximum for both labetolol PO and nifedipine PO. She is not candidate for enalapril due to breast feeding less than 32 weeks. Discussed potential need for re-admission to ICU  - Continue to monitor and treat sustained severe range per protocol  - S/p 24h mag  - D#4 60 mg BID Nifedipine, increased to D#1 Labetalol 800 TID  - consider hydral 10 PO QID  - Strict I&O, adequate UO  - repeat HELLP labs wnl           Patrick Forman DO, MA  [facility]- PGY2  0100 AM 2023

## 2023-10-05 NOTE — LACTATION NOTE
This note was copied from a baby's chart.  Lactation Follow Up Note    Reason for visit/ call:  Dispense rental pump     Supply:  Trying to pump every 3 hours during the day and once at night (about every 4 hours). Missed overnight pump last night. Blood pressures elevated and trying to rest.   Pumps 20-30ml per pumping    Significant changes (medications, equipment, comfort, etc):  Switched to maintain setting    Education given:  Where to return rental pump  Rental pump  Pumping frequency  Hands on pumping  Maintain setting    Plan:  Will continue to pump and log frequency and volumes  Selena is unsure of discharge date due to labile blood pressures.   Encouraged Selena to call with questions or concerns as needed before 5 day check in.     Tiffanie Hammond RN, IBCLC   Lactation Consultant  Ascom: *[phone]  Office: [phone]

## 2023-10-05 NOTE — PLAN OF CARE
Goal Outcome Evaluation:      Plan of Care Reviewed With: patient, spouse    Overall Patient Progress: improvingOverall Patient Progress: improving    Outcome Evaluation: /92, other VSS. Continues to deny all s/s of pre-eclampsia. Made pt aware hydralazine has been increased from 10mg to 20mg and will start on the next dose.  Postoperative pain well managed with just tylenol as nephrology encouraged patient to not use ibuprofen or other NSAIDs at this time.  Continues to pump for  infant in NICU, breasts filling, milk coming in.  , Shiv, supportive at bedside.  Deny questions or concerns, continue with plan of care.

## 2023-10-05 NOTE — PROVIDER NOTIFICATION
10/04/23 2117   Provider Notification   Provider Name/Title G2   Method of Notification Electronic Page   Request Evaluate-Remote   Notification Reason Status Update     Update: Just treated with 40mg of IV labetalol but IV went bad. Waiting for a new one to be placed.

## 2023-10-06 LAB
ALT SERPL W P-5'-P-CCNC: 41 U/L (ref 0–50)
AST SERPL W P-5'-P-CCNC: 46 U/L (ref 0–45)
CREAT SERPL-MCNC: 0.86 MG/DL (ref 0.51–0.95)
EGFRCR SERPLBLD CKD-EPI 2021: >90 ML/MIN/1.73M2
ERYTHROCYTE [DISTWIDTH] IN BLOOD BY AUTOMATED COUNT: 14.2 % (ref 10–15)
HCT VFR BLD AUTO: 30.5 % (ref 35–47)
HGB BLD-MCNC: 9.9 G/DL (ref 11.7–15.7)
MCH RBC QN AUTO: 30.5 PG (ref 26.5–33)
MCHC RBC AUTO-ENTMCNC: 32.5 G/DL (ref 31.5–36.5)
MCV RBC AUTO: 94 FL (ref 78–100)
PATH REPORT.COMMENTS IMP SPEC: NORMAL
PATH REPORT.COMMENTS IMP SPEC: NORMAL
PATH REPORT.FINAL DX SPEC: NORMAL
PATH REPORT.GROSS SPEC: NORMAL
PATH REPORT.MICROSCOPIC SPEC OTHER STN: NORMAL
PATH REPORT.RELEVANT HX SPEC: NORMAL
PHOTO IMAGE: NORMAL
PLATELET # BLD AUTO: 195 10E3/UL (ref 150–450)
RBC # BLD AUTO: 3.25 10E6/UL (ref 3.8–5.2)
WBC # BLD AUTO: 9.3 10E3/UL (ref 4–11)

## 2023-10-06 PROCEDURE — 84460 ALANINE AMINO (ALT) (SGPT): CPT | Performed by: OBSTETRICS & GYNECOLOGY

## 2023-10-06 PROCEDURE — 250N000013 HC RX MED GY IP 250 OP 250 PS 637

## 2023-10-06 PROCEDURE — 250N000011 HC RX IP 250 OP 636: Mod: JZ

## 2023-10-06 PROCEDURE — 85027 COMPLETE CBC AUTOMATED: CPT | Performed by: OBSTETRICS & GYNECOLOGY

## 2023-10-06 PROCEDURE — 84450 TRANSFERASE (AST) (SGOT): CPT | Performed by: OBSTETRICS & GYNECOLOGY

## 2023-10-06 PROCEDURE — 120N000002 HC R&B MED SURG/OB UMMC

## 2023-10-06 PROCEDURE — 82565 ASSAY OF CREATININE: CPT | Performed by: OBSTETRICS & GYNECOLOGY

## 2023-10-06 PROCEDURE — 36415 COLL VENOUS BLD VENIPUNCTURE: CPT | Performed by: OBSTETRICS & GYNECOLOGY

## 2023-10-06 PROCEDURE — 99232 SBSQ HOSP IP/OBS MODERATE 35: CPT | Mod: GC | Performed by: OBSTETRICS & GYNECOLOGY

## 2023-10-06 PROCEDURE — 250N000013 HC RX MED GY IP 250 OP 250 PS 637: Performed by: OBSTETRICS & GYNECOLOGY

## 2023-10-06 PROCEDURE — 99232 SBSQ HOSP IP/OBS MODERATE 35: CPT | Performed by: INTERNAL MEDICINE

## 2023-10-06 RX ORDER — ENALAPRIL MALEATE 5 MG/1
5 TABLET ORAL 2 TIMES DAILY
Status: DISCONTINUED | OUTPATIENT
Start: 2023-10-06 | End: 2023-10-07 | Stop reason: HOSPADM

## 2023-10-06 RX ADMIN — ENALAPRIL MALEATE 5 MG: 5 TABLET ORAL at 20:30

## 2023-10-06 RX ADMIN — LABETALOL HYDROCHLORIDE 800 MG: 300 TABLET, FILM COATED ORAL at 14:41

## 2023-10-06 RX ADMIN — FLUTICASONE PROPIONATE 1 PUFF: 110 AEROSOL, METERED RESPIRATORY (INHALATION) at 20:35

## 2023-10-06 RX ADMIN — ALBUTEROL SULFATE 2 PUFF: 90 AEROSOL, METERED RESPIRATORY (INHALATION) at 20:34

## 2023-10-06 RX ADMIN — HYDRALAZINE HYDROCHLORIDE 20 MG: 10 TABLET ORAL at 08:51

## 2023-10-06 RX ADMIN — SERTRALINE HYDROCHLORIDE 100 MG: 100 TABLET ORAL at 20:29

## 2023-10-06 RX ADMIN — ENOXAPARIN SODIUM 40 MG: 40 INJECTION SUBCUTANEOUS at 14:06

## 2023-10-06 RX ADMIN — HYDRALAZINE HYDROCHLORIDE 20 MG: 10 TABLET ORAL at 20:28

## 2023-10-06 RX ADMIN — HYDROCHLOROTHIAZIDE 25 MG: 25 TABLET ORAL at 08:48

## 2023-10-06 RX ADMIN — NIFEDIPINE 60 MG: 30 TABLET, FILM COATED, EXTENDED RELEASE ORAL at 08:49

## 2023-10-06 RX ADMIN — FLUTICASONE PROPIONATE 1 PUFF: 110 AEROSOL, METERED RESPIRATORY (INHALATION) at 08:48

## 2023-10-06 RX ADMIN — LABETALOL HYDROCHLORIDE 800 MG: 300 TABLET, FILM COATED ORAL at 22:13

## 2023-10-06 RX ADMIN — ACETAMINOPHEN 975 MG: 325 TABLET, FILM COATED ORAL at 20:27

## 2023-10-06 RX ADMIN — SENNOSIDES AND DOCUSATE SODIUM 1 TABLET: 50; 8.6 TABLET ORAL at 08:50

## 2023-10-06 RX ADMIN — HYDRALAZINE HYDROCHLORIDE 20 MG: 10 TABLET ORAL at 02:10

## 2023-10-06 RX ADMIN — ACETAMINOPHEN 975 MG: 325 TABLET, FILM COATED ORAL at 06:06

## 2023-10-06 RX ADMIN — ACETAMINOPHEN 975 MG: 325 TABLET, FILM COATED ORAL at 14:06

## 2023-10-06 RX ADMIN — ALBUTEROL SULFATE 2 PUFF: 90 AEROSOL, METERED RESPIRATORY (INHALATION) at 08:48

## 2023-10-06 RX ADMIN — LABETALOL HYDROCHLORIDE 800 MG: 300 TABLET, FILM COATED ORAL at 06:05

## 2023-10-06 RX ADMIN — HYDRALAZINE HYDROCHLORIDE 20 MG: 10 TABLET ORAL at 14:41

## 2023-10-06 RX ADMIN — NIFEDIPINE 60 MG: 30 TABLET, FILM COATED, EXTENDED RELEASE ORAL at 20:29

## 2023-10-06 RX ADMIN — MONTELUKAST 10 MG: 10 TABLET, FILM COATED ORAL at 20:30

## 2023-10-06 ASSESSMENT — ACTIVITIES OF DAILY LIVING (ADL)
ADLS_ACUITY_SCORE: 22

## 2023-10-06 NOTE — PLAN OF CARE
Data: Vital signs within normal limits. Postpartum checks within normal limits - see flow record. Patient eating and drinking normally. Patient able to empty bladder independently and is up ambulating. No apparent signs of infection. Incision healing well. Patient performing self cares and is able to pump for infant  Action: Patient medicated during the shift for pain. See MAR. Patient reassessed within 1 hour after each medication and pain was improved - patient stated she was comfortable. Patient education done about self care and pain management. See flow record.  Response: Support persons  present.   Plan: Anticipate discharge on 10/6.Goal Outcome Evaluation:

## 2023-10-06 NOTE — PROGRESS NOTES
Nephrology Initial Consult  2023      Selena Dutton MRN:[id number] YOB: 1990  Date of Admission:2023  Primary care provider: [facility]  Requesting physician: Hilary Omalley MD    ASSESSMENT AND RECOMMENDATIONS:   #Elevated BP Post partum:  Clinical picture is rather suggestive of preeclampsia than HTN secondary to renal disease, especially that her creatinine is rather at her baseline. Her proteinuria is explained by her preeclampsia and her hematuria by post-partum vaginal bleed. The possibility of Pregnancy-related renal TMA is less likely. She does have some anemia and thrombocytopaenia but both are stable/rather improving and can be explained by blood loss after delivery and preeclampsia respectively. Besides, there is no evidence of hemolysis(normal bilirubin). She is completely asymptomatic  - Currently on Nifedipine 60 mg BID; Labetalol 400 TID and hydrochlorothiazide 25 mg. Hydralazine 20 mg qid added today. Her BP today has ulises <150/90.   -Please avoid NSAIDs  -She was advised that her requirements for medications will likely decrease with time with a significant % of patients coming off their antihypertensives completely; so she was instructed to check her BP daily at home and contact the medical team if she noticed significant drop in her BP.     Recommendations were communicated to primary team verbally and via this note    WILLIAM HEART MD   Division of Renal Disease and Hypertension  [facility]  [employer] Web [employer]      REASON FOR CONSULT: HTN    HISTORY OF PRESENT ILLNESS:  Admitting provider and nursing notes reviewed  Selena Dutton is a 32 year old . Her pregnancy was complicated by Preeclampsia at 29wk 6 day, with severe features by blood pressure criteria and proteinuria. Her course was also complicated by starvation ketosis. Initially admitted to the ICU, transferred to the antepartum unit 10/4/2023. She delivered a baby boy,  now day 3 post partum. Her BP continues to be elevated so nephrology were consulted for further management and to r/o secondary causes of HTN.  Mom and baby are doing ok. She is breast feeding. Family by bedside  She is asymptomatic. No headache, visual changes. LE edema much beter. She is ambulating       PAST MEDICAL HISTORY:  Reviewed with patient on 10/06/2023     Past Medical History:   Diagnosis Date    Anxiety and depression     Moderate persistent asthma without complication        Past Surgical History:   Procedure Laterality Date     SECTION N/A 10/2/2023    Procedure:  section;  Surgeon: Khadijah Mckeon MD;  Location: [facility] L+D    HC TOOTH EXTRACTION W/FORCEP      MOUTH SURGERY          MEDICATIONS:  PTA Meds  Prior to Admission medications    Medication Sig Last Dose Taking? Auth Provider Long Term End Date   albuterol (PROAIR HFA/PROVENTIL HFA/VENTOLIN HFA) 108 (90 Base) MCG/ACT inhaler Inhale 2 puffs into the lungs every 6 hours   Hazel Ghotra MD Yes    fluticasone (FLOVENT HFA) 110 MCG/ACT inhaler Inhale 1 puff into the lungs 2 times daily   Hazel Ghotra MD Yes    montelukast (SINGULAIR) 10 MG tablet Take 1 tablet (10 mg) by mouth At Bedtime   Sary Meyer APRN CNP Yes    Prenat w/o P-VS-Plowklp-FA-DHA (PNV-DHA PO) Take 1 tablet by mouth daily   Reported, Patient     sertraline (ZOLOFT) 100 MG tablet Take 1 tablet (100 mg) by mouth daily   Sary Meyer APRN CNP Yes       Current Meds   acetaminophen  975 mg Oral Q6H    albuterol  2 puff Inhalation Q6H    calcium carbonate  1,000 mg Oral TID    enoxaparin ANTICOAGULANT  40 mg Subcutaneous Q24H    fluticasone  1 puff Inhalation BID    hydrALAZINE  20 mg Oral 4x Daily    hydrochlorothiazide  25 mg Oral Daily    [Held by provider] ibuprofen  800 mg Oral Q6H    labetalol  800 mg Oral Q8H ESTEVAN    montelukast  10 mg Oral At Bedtime    montelukast  10 mg Oral At Bedtime    NIFEdipine ER OSMOTIC  60 mg Oral BID    senna-docusate  1  "tablet Oral BID    Or    senna-docusate  2 tablet Oral BID    sertraline  100 mg Oral Daily    sodium chloride (PF)  3 mL Intracatheter Q8H     Infusion Meds   BUPivacaine liposome (EXPAREL) LA inj was given in the infiltration site to produce post-op analgesia. Duration of action is up to 72 hours. Other \"delfino\" meds should not be given for 96 hours except for lidocaine 4% patch. This is for INFORMATION ONLY.      dextrose 5% in lactated ringers 75 mL/hr at 10/03/23 0808    lactated ringers 100 mL/hr at 10/03/23 1612    - MEDICATION INSTRUCTIONS -      - MEDICATION INSTRUCTIONS -      oxytocin in 0.9% NaCl      oxytocin in 0.9% NaCl         ALLERGIES:    Allergies   Allergen Reactions    Nuts Anaphylaxis    Cat Hair Extract     Peanut (Diagnostic)        REVIEW OF SYSTEMS:  A comprehensive of systems was negative except as noted above.    SOCIAL HISTORY:   Social History     Socioeconomic History    Marital status:      Spouse name: Jhon    Number of children: Not on file    Years of education: Not on file    Highest education level: Not on file   Occupational History    Occupation: Human Resources   Tobacco Use    Smoking status: Never    Smokeless tobacco: Never   Vaping Use    Vaping Use: Never used   Substance and Sexual Activity    Alcohol use: Not Currently     Comment: 3 drinks/week    Drug use: Never    Sexual activity: Yes     Partners: Male     Birth control/protection: None   Other Topics Concern    Not on file   Social History Narrative    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     Lives in [place] with her  and dog.     Bought a house in [facility] this Spring, closes in 2 weeks.      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         Free time: puppy havenese named Michelle.         Sary Meyer, DNP, APRN, CNP    06/01/22     Social Determinants of Health     Financial Resource Strain: Not on file   Food Insecurity: Not on file   Transportation Needs: Not on file   Physical " Activity: Not on file   Stress: Not on file   Social Connections: Not on file   Interpersonal Safety: Not on file   Housing Stability: Not on file     Reviewed with patient 10/5/2023   and mother accompanies Selena Dutton in hospital room    FAMILY MEDICAL HISTORY:   Family History   Problem Relation Age of Onset    Osteoporosis Mother     Heart Disease Father     No Known Problems Brother     Cholelithiasis Maternal Grandmother     Myocardial Infarction Maternal Grandfather     Skin Cancer Paternal Grandmother     Diabetes Type 2  Paternal Grandfather     Transient ischemic attack Maternal Uncle      No Family history of kidney disease, or preeclampsia  Reviewed with patient     PHYSICAL EXAM:   Temp  Av.4  F (36.9  C)  Min: 97.3  F (36.3  C)  Max: 99.2  F (37.3  C)      Pulse  Av.6  Min: 58  Max: 118 Resp  Av.3  Min: 6  Max: 33  SpO2  Av.9 %  Min: 93 %  Max: 100 %       BP (!) 149/95 (BP Location: Right arm, Patient Position: Semi-Mccarty's, Cuff Size: Adult Regular)   Pulse 91   Temp 99.1  F (37.3  C) (Oral)   Resp 16   Wt 67.1 kg (148 lb)   LMP 2023 (Exact Date)   SpO2 96%   Breastfeeding Unknown   BMI 23.18 kg/m     Date 10/05/23 0700 - 10/06/23 0659   Shift 2429-0628 5026-5758 3319-3259 24 Hour Total   INTAKE   P.O. 600   600   Shift Total(mL/kg) 600(8.52)   600(8.52)   OUTPUT   Urine 200   200   Shift Total(mL/kg) 200(2.84)   200(2.84)   Weight (kg) 70.4 70.4 70.4 70.4      Admit Weight: 75.2 kg (165 lb 12.6 oz)     GENERAL APPEARANCE: no  distress,  awake  EYES: no scleral icterus, pupils equal  Lymphatics: no cervical or supraclavicular LAD  Pulmonary: lungs clear to auscultation with equal breath sounds bilaterally, no clubbing  CV: regular rhythm, normal rate, no rub   - JVD none   - Edema trace  GI: soft, nontender, normal bowel sounds  MS: no evidence of inflammation in joints, no muscle tenderness  : no gomez  SKIN: no rash, warm, dry, no cyanosis  NEURO: face  symmetric, no asterixis     LABS:   I have reviewed the following labs:  CMP  Recent Labs   Lab 10/06/23  1042 10/04/23  2225 10/04/23  0744 10/03/23  0758 10/02/23  1744 10/02/23  0906 10/02/23  0833 10/01/23  2204 10/01/23  1844 10/01/23  1129 10/01/23  1030 10/01/23  0527 10/01/23  0322   NA  --  135  --   --  137  --  138  --  133*  --  133*  --  129*   POTASSIUM  --  3.8  --   --  4.4  --  4.2  --  4.1  --  4.1  --  3.6  3.6   CHLORIDE  --  104  --   --  104  --  106  --  102  --  101  --  98   CO2  --  21*  --   --  22  --  21*  --  20*  --  17*  --  16*   ANIONGAP  --  10  --   --  11  --  11  --  11  --  15  --  15   GLC  --  110*  --   --  89 79 84   < > 135*   < > 131*   < > 175*   BUN  --  18.3  --   --  15.5  --  14.2  --  10.0  --  6.1  --  5.4*   CR 0.86 0.82 0.86 0.92 0.80  --  0.77  --  0.79  --  0.69  --  0.72   GFRESTIMATED >90 >90 >90 84 >90  --  >90  --  >90  --  >90  --  >90   BERNARDA  --  8.8  --   --  8.7  --  8.0*  --  7.2*  --  6.3*  --  5.5*   MAG  --   --   --  8.4*  --   --   --   --   --   --  5.1*  --  8.3*   PHOS  --   --   --   --   --   --   --   --   --   --   --   --  3.0   PROTTOTAL  --  5.2*  --   --  5.7*  --  6.0*  --  5.7*  --   --   --  5.8*   ALBUMIN  --  2.8*  --   --  3.2*  --  3.3*  --  3.2*  --   --   --  3.1*   BILITOTAL  --  <0.2  --   --  0.2  --  0.2  --  0.2  --   --   --  0.2   ALKPHOS  --  106*  --   --  110*  --  117*  --  113*  --   --   --  125*   AST 46* 24 42 65* 35  --  34  --  30  --   --   --  39   ALT 41 28 36 39 30  --  30  --  27  --   --   --  31    < > = values in this interval not displayed.     CBC  Recent Labs   Lab 10/06/23  1042 10/05/23  0741 10/04/23  2225 10/04/23  0744 10/03/23  0758 10/02/23  0418   HGB 9.9*  --  9.5* 10.1* 9.7* 10.2*   WBC 9.3  --  11.1* 10.2  --  12.2*   RBC 3.25*  --  3.09* 3.39*  --  3.38*   HCT 30.5*  --  28.1* 31.4*  --  30.5*   MCV 94  --  91 93  --  90   MCH 30.5  --  30.7 29.8  --  30.2   MCHC 32.5  --  33.8 32.2  --   33.4   RDW 14.2  --  14.1 14.2  --  14.6    191 183 181 177 181     INR  Recent Labs   Lab 10/02/23  1744   INR 0.95   PTT 23     ABG  Recent Labs   Lab 10/01/23  1030 10/01/23  0322 09/30/23  1035   O2PER 18 21 0      URINE STUDIES  Recent Labs   Lab Test 05/17/23  1359   COLOR Yellow   APPEARANCE Clear   URINEGLC Negative   URINEBILI Negative   URINEKETONE Trace*   SG 1.025   UBLD Negative   URINEPH 5.5   PROTEIN Negative   UROBILINOGEN 1.0   NITRITE Negative   LEUKEST Trace*   RBCU 2-5*   WBCU 0-5     No lab results found.  PTH  No lab results found.  IRON STUDIES  Recent Labs   Lab Test 10/02/23  0833   CHIP 56       IMAGING:  All imaging studies reviewed by me.     WILLIAM HEART MD

## 2023-10-06 NOTE — PROVIDER NOTIFICATION
10/05/23 2014   Provider Notification   Provider Name/Title G2 brady   Method of Notification Electronic Page   Request Evaluate-Remote   Notification Reason Medication Request     Nephrology had recommended in their not to hold nsaids, would you like to discontinue ibuprofen?    Hold NSAID for now and resident will discontinue in computer.

## 2023-10-06 NOTE — PLAN OF CARE
Pt VSS ex elevated BP.  Pain managed with tylenol.  C/s incision VENKATESH, healing well.  Pt pumping for baby in NICU and visiting NICU frequently.  Pt is ambulating independently, voiding spontaneously, and tolerating regular diet.  Continue with plan of care.

## 2023-10-07 VITALS
WEIGHT: 143.3 LBS | OXYGEN SATURATION: 96 % | RESPIRATION RATE: 16 BRPM | HEART RATE: 83 BPM | TEMPERATURE: 99.2 F | SYSTOLIC BLOOD PRESSURE: 105 MMHG | DIASTOLIC BLOOD PRESSURE: 73 MMHG | BODY MASS INDEX: 22.44 KG/M2

## 2023-10-07 PROBLEM — O14.10 PREECLAMPSIA, SEVERE: Status: ACTIVE | Noted: 2023-10-07

## 2023-10-07 LAB
ALT SERPL W P-5'-P-CCNC: 46 U/L (ref 0–50)
AST SERPL W P-5'-P-CCNC: 37 U/L (ref 0–45)
CREAT SERPL-MCNC: 0.69 MG/DL (ref 0.51–0.95)
EGFRCR SERPLBLD CKD-EPI 2021: >90 ML/MIN/1.73M2
ERYTHROCYTE [DISTWIDTH] IN BLOOD BY AUTOMATED COUNT: 14.1 % (ref 10–15)
HCT VFR BLD AUTO: 30.8 % (ref 35–47)
HGB BLD-MCNC: 10.4 G/DL (ref 11.7–15.7)
MCH RBC QN AUTO: 31.1 PG (ref 26.5–33)
MCHC RBC AUTO-ENTMCNC: 33.8 G/DL (ref 31.5–36.5)
MCV RBC AUTO: 92 FL (ref 78–100)
PLATELET # BLD AUTO: 217 10E3/UL (ref 150–450)
RBC # BLD AUTO: 3.34 10E6/UL (ref 3.8–5.2)
WBC # BLD AUTO: 9 10E3/UL (ref 4–11)

## 2023-10-07 PROCEDURE — 82565 ASSAY OF CREATININE: CPT

## 2023-10-07 PROCEDURE — 250N000013 HC RX MED GY IP 250 OP 250 PS 637

## 2023-10-07 PROCEDURE — 85027 COMPLETE CBC AUTOMATED: CPT

## 2023-10-07 PROCEDURE — 99238 HOSP IP/OBS DSCHRG MGMT 30/<: CPT | Mod: GC | Performed by: OBSTETRICS & GYNECOLOGY

## 2023-10-07 PROCEDURE — 258N000003 HC RX IP 258 OP 636

## 2023-10-07 PROCEDURE — 84460 ALANINE AMINO (ALT) (SGPT): CPT

## 2023-10-07 PROCEDURE — 36415 COLL VENOUS BLD VENIPUNCTURE: CPT

## 2023-10-07 PROCEDURE — 84450 TRANSFERASE (AST) (SGOT): CPT

## 2023-10-07 PROCEDURE — 250N000013 HC RX MED GY IP 250 OP 250 PS 637: Performed by: OBSTETRICS & GYNECOLOGY

## 2023-10-07 RX ORDER — IBUPROFEN 600 MG/1
600 TABLET, FILM COATED ORAL EVERY 6 HOURS PRN
Qty: 60 TABLET | Refills: 0 | Status: SHIPPED | OUTPATIENT
Start: 2023-10-07 | End: 2023-12-01

## 2023-10-07 RX ORDER — ENALAPRIL MALEATE 5 MG/1
5 TABLET ORAL 2 TIMES DAILY
Qty: 180 TABLET | Refills: 0 | Status: SHIPPED | OUTPATIENT
Start: 2023-10-07 | End: 2023-10-07

## 2023-10-07 RX ORDER — ACETAMINOPHEN 325 MG/1
650 TABLET ORAL EVERY 6 HOURS PRN
Qty: 100 TABLET | Refills: 0 | Status: SHIPPED | OUTPATIENT
Start: 2023-10-07

## 2023-10-07 RX ORDER — HYDRALAZINE HYDROCHLORIDE 10 MG/1
20 TABLET, FILM COATED ORAL 4 TIMES DAILY
Qty: 480 TABLET | Refills: 0 | Status: SHIPPED | OUTPATIENT
Start: 2023-10-07 | End: 2023-10-13

## 2023-10-07 RX ORDER — AMOXICILLIN 250 MG
1 CAPSULE ORAL DAILY
Qty: 100 TABLET | Refills: 0 | Status: SHIPPED | OUTPATIENT
Start: 2023-10-07 | End: 2023-12-01

## 2023-10-07 RX ORDER — SODIUM CHLORIDE 9 MG/ML
INJECTION, SOLUTION INTRAVENOUS
Status: DISCONTINUED
Start: 2023-10-07 | End: 2023-10-07 | Stop reason: HOSPADM

## 2023-10-07 RX ORDER — LABETALOL 200 MG/1
800 TABLET, FILM COATED ORAL EVERY 8 HOURS
Qty: 720 TABLET | Refills: 0 | Status: SHIPPED | OUTPATIENT
Start: 2023-10-07 | End: 2023-11-09

## 2023-10-07 RX ORDER — HYDROCHLOROTHIAZIDE 25 MG/1
25 TABLET ORAL DAILY
Qty: 60 TABLET | Refills: 0 | Status: SHIPPED | OUTPATIENT
Start: 2023-10-07 | End: 2023-10-13

## 2023-10-07 RX ORDER — FERROUS SULFATE 325(65) MG
325 TABLET, DELAYED RELEASE (ENTERIC COATED) ORAL EVERY OTHER DAY
Qty: 90 TABLET | Refills: 1 | Status: SHIPPED | OUTPATIENT
Start: 2023-10-07 | End: 2024-02-02

## 2023-10-07 RX ADMIN — ACETAMINOPHEN 975 MG: 325 TABLET, FILM COATED ORAL at 08:41

## 2023-10-07 RX ADMIN — LABETALOL HYDROCHLORIDE 800 MG: 300 TABLET, FILM COATED ORAL at 15:14

## 2023-10-07 RX ADMIN — NIFEDIPINE 60 MG: 30 TABLET, FILM COATED, EXTENDED RELEASE ORAL at 08:40

## 2023-10-07 RX ADMIN — SODIUM CHLORIDE 500 ML: 9 INJECTION, SOLUTION INTRAVENOUS at 11:39

## 2023-10-07 RX ADMIN — ACETAMINOPHEN 975 MG: 325 TABLET, FILM COATED ORAL at 02:22

## 2023-10-07 RX ADMIN — ALBUTEROL SULFATE 2 PUFF: 90 AEROSOL, METERED RESPIRATORY (INHALATION) at 08:41

## 2023-10-07 RX ADMIN — ACETAMINOPHEN 975 MG: 325 TABLET, FILM COATED ORAL at 15:14

## 2023-10-07 RX ADMIN — HYDRALAZINE HYDROCHLORIDE 20 MG: 10 TABLET ORAL at 02:23

## 2023-10-07 RX ADMIN — HYDRALAZINE HYDROCHLORIDE 20 MG: 10 TABLET ORAL at 15:13

## 2023-10-07 RX ADMIN — LABETALOL HYDROCHLORIDE 800 MG: 300 TABLET, FILM COATED ORAL at 05:53

## 2023-10-07 RX ADMIN — HYDRALAZINE HYDROCHLORIDE 20 MG: 10 TABLET ORAL at 08:40

## 2023-10-07 RX ADMIN — FLUTICASONE PROPIONATE 1 PUFF: 110 AEROSOL, METERED RESPIRATORY (INHALATION) at 08:41

## 2023-10-07 RX ADMIN — ENALAPRIL MALEATE 5 MG: 5 TABLET ORAL at 08:41

## 2023-10-07 RX ADMIN — HYDROCHLOROTHIAZIDE 25 MG: 25 TABLET ORAL at 08:41

## 2023-10-07 ASSESSMENT — ACTIVITIES OF DAILY LIVING (ADL)
ADLS_ACUITY_SCORE: 22

## 2023-10-07 NOTE — PLAN OF CARE
Goal Outcome Evaluation:      Plan of Care Reviewed With: patient    Overall Patient Progress: improvingOverall Patient Progress: improving     Pts Bp running 130's-150's/90's-100 ( see flow sheet). Other VSS. Pt is up ambulating within room and hallway independently. Pt is voiding without difficulty. Pt denies any pre-e symptoms. Taking Tylenol for pain control. Pt is pumping independently and getting good amounts. Medicated per orders. Continue with postpartum cares and monitor BP every 4 hours.

## 2023-10-07 NOTE — PROGRESS NOTES
Brief Interval Progress Note    Call placed to RN due to patient lower blood pressures. Reviewed blood pressure medications with Dr. Mitchell. Will plan for fluid bolus and discontinuation of Enalapril. If patient feeling better/asymptomatic following bolus okay to still discharge home. Will consider additional day of blood pressure monitoring as needed.     Luisito Menchaca DO, MS  [facility]  Maternal Fetal Medicine Resident, PGY-3  10/07/2023 10:58 AM    To reach the MATERNAL FETAL MEDICINE team for this patient, please page [phone]

## 2023-10-07 NOTE — LACTATION NOTE
This note was copied from a baby's chart.  Lactation Follow Up Note    Reason for visit/ call:  DOL 5 supply/comfort check    Supply:  Up to 90ml/pp every three hours on maintain setting and increasing.    Significant changes (medications, equipment, comfort, etc):  Denies discomfort; has home rental pump and kit in room.    Skin to skin/ nuzzling/ latching:  Encouraged STS when able.    Education given:  Discussed breast milk storage at home, freezer management, and how much fresh milk to transport milk to hospital daily based on feedings.  Positive feedback for pumping efforts.    Plan:  Continued lactation support.    Miriam Stovall, RNC-RIVERA, IBCLC   Lactation Consultant  Ascom: *[phone]  Office: [phone]

## 2023-10-07 NOTE — DISCHARGE INSTRUCTIONS
Warning Signs after Having a Baby    Keep this paper on your fridge or somewhere else where you can see it.    Call your provider if you have any of these symptoms up to 12 weeks after having your baby.    Thoughts of hurting yourself or your baby  Pain in your chest or trouble breathing  Severe headache not helped by pain medicine  Eyesight concerns (blurry vision, seeing spots or flashes of light, other changes to eyesight)  Fainting, shaking or other signs of a seizure    Call 9-1-1 if you feel that it is an emergency.     The symptoms below can happen to anyone after giving birth. They can be very serious. Call your provider if you have any of these warning signs.    My provider s phone number: _______________________    Losing too much blood (hemorrhage)    Call your provider if you soak through a pad in less than an hour or pass blood clots bigger than a golf ball. These may be signs that you are bleeding too much.    Blood clots in the legs or lungs    After you give birth, your body naturally clots its blood to help prevent blood loss. Sometimes this increased clotting can happen in other areas of the body, like the legs or lungs. This can block your blood flow and be very dangerous.     Call your provider if you:  Have a red, swollen spot on the back of your leg that is warm or painful when you touch it.   Are coughing up blood.     Infection    Call your provider if you have any of these symptoms:  Fever of 100.4 F (38 C) or higher.  Pain or redness around your stitches if you had an incision.   Any yellow, white, or green fluid coming from places where you had stitches or surgery.    Mood Problems (postpartum depression)    Many people feel sad or have mood changes after having a baby. But for some people, these mood swings are worse.     Call your provider right away if you feel so anxious or nervous that you can't care for yourself or your baby.    Preeclampsia (high blood pressure)    Even if you  didn't have high blood pressure when you were pregnant, you are at risk for the high blood pressure disease called preeclampsia. This risk can last up to 12 weeks after giving birth.     Call your provider if you have:   Pain on your right side under your rib cage  Sudden swelling in the hands and face    Remember: You know your body. If something doesn't feel right, get medical help.     For informational purposes only. Not to replace the advice of your health care provider. Copyright 2020 [facility]. All rights reserved. Clinically reviewed by Colette Ha, RNC-OB, MSN. DxNA 750874 - Rev .    Postop  Birth Instructions    Activity     Do not lift more than 10 pounds for 6 weeks after surgery.  Ask family and friends for help when you need it.  No driving until you have stopped taking your pain medications (usually two weeks after surgery).  No heavy exercise or activity for 6 weeks.  Don't do anything that will put a strain on your surgery site.  Don't strain when using the toilet.  Your care team may prescribe a stool softener if you have problems with your bowel movements.     To care for your incision:     Keep the incision clean and dry.  Do not soak your incision in water. No swimming or hot tubs until it has fully healed. You may soak in the bathtub if the water level is below your incision.  Do not use peroxide, gel, cream, lotion, or ointment on your incision.  Adjust your clothes to avoid pressure on your surgery site (check the elastic in your underwear for example).     You may see a small amount of clear or pink drainage and this is normal.  Check with your health care provider:     If the drainage increases or has an odor.  If the incision reddens, you have swelling, or develop a rash.  If you have increased pain and the medicine we prescribed doesn't help.  If you have a fever above 100.4 F (38 C) with or without chills when placing thermometer under your tongue.    The area around your incision (surgery wound), will feel numb.  This is normal. The numbness should go away in less than a year.     Keep your hands clean:  Always wash your hands before touching your incision (surgery wound). This helps reduce your risk of infection. If your hands aren't dirty, you may use an alcohol hand-rub to clean your hands. Keep your nails clean and short.    Call your healthcare provider if you have any of these symptoms:     You soak a sanitary pad with blood within 1 hour, or you see blood clots larger than a golf ball.  Bleeding that lasts more than 6 weeks.  Vaginal discharge that smells bad.  Severe pain, cramping or tenderness in your lower belly area.  A need to urinate more frequently (use the toilet more often), more urgently (use the toilet very quickly), or it burns when you urinate.  Nausea and vomiting.  Redness, swelling or pain around a vein in your leg.  Problems breastfeeding or a red or painful area on your breast.  Chest pain and cough or are gasping for air.  Problems with coping with sadness, anxiety or depression. If you have concerns about hurting yourself or the baby, call your provider immediately.    You have questions or concerns after you return home.     Preeclampsia   Call your doctor right away if you have any of the following:  - Edema (swelling) in your face or hands  - Rapid weight gain-about 1 pound or more in a day  - Headache  - Abdominal pain on your right side  - Vision problems (flashes or spots)  - You have questions or concerns once you return home

## 2023-10-07 NOTE — PROVIDER NOTIFICATION
10/07/23 1500   Provider Notification   Notification Reason Other;Status Update     Dr. Culp notified /76 HR 91. BP meds due now. Okay to give Hydralazine 20mg and Labetalol 800mg? Please advise. Thanks.    Per james Tejeda to give both ordered meds. Requested to be informed if pt decides to discharge home.

## 2023-10-07 NOTE — PLAN OF CARE
Pt's vitals & PP assessments within normal range. Lungs clear.   Pt has adequate output. Had BM & passing flatus.  Pt denies HA, visual disturbances & URQ discomfort.   Pt pumping for her NICU baby.  Will continue on plan of cares.       Goal Outcome Evaluation: Improving.       Plan of Care Reviewed With: patient, spouse

## 2023-10-07 NOTE — PLAN OF CARE
Problem: Postpartum ( Delivery)  Goal: Optimal Pain Control and Function  Intervention: Prevent or Manage Pain  Recent Flowsheet Documentation  Taken 10/7/2023 0840 by Angella De Leon, RN  Perineal Care: absorbent brief/pad changed  Taken 10/7/2023 08 by Angella De Leon, RN  Pain Management Interventions: medication (see MAR)    Goal Outcome Evaluation:  VSS. Around 1030, pt reported feeling dizzy and called for BP to be checked. BP was 90/57 HR 90 (lowest BP all shift); Dr. Culp notified. 500cc bolus of NaCl given. Per Dr. Culp discontinue enalapril. Denies s/sx of Pre-E. DTR +2, no clonus. Postpartum check WDL. Up ad bre. Tolerating regular diet. Voiding without difficulty. Passing gas. Pumping independently with good results.  at bedside and supportive. Sent with electric breast pump given by . Sent with BP cuff/monitor; reviewed parameters for BP check. Pt educated on discharge instructions and given written handout, verbalized understanding of instructions. Discharged with medications, patient educated on medications and given written copies as well.  Patient discharged to home.

## 2023-10-07 NOTE — PROVIDER NOTIFICATION
10/07/23 1040   Provider Notification   Provider Name/Title Dr. Culp G1   Method of Notification Electronic Page   Request Evaluate in Person   Notification Reason Vital Signs Change      Paged on behalf of bedside RN Angella:    Pt's BP right now is 90/57. Pt reports dizziness/lightheaded. Had all scheduled BP meds at 0840. Pls advise.

## 2023-10-07 NOTE — PROGRESS NOTES
Postpartum Progress Note    S: Patient feeling well this AM.  No HA, vision changes, RUQ pain.  Baby doing well in NICU.   Meeting post op goals.  Pumping successfully.   Working on BP control. Pain well controlled.    O:  Patient Vitals for the past 24 hrs:   BP Temp Temp src Pulse Resp   10/07/23 0558 (!) 138/91 -- -- 86 --   10/07/23 0215 119/80 98.8  F (37.1  C) Oral 86 16   10/06/23 2211 139/88 98.4  F (36.9  C) Oral 99 --   10/06/23 2024 (!) 152/100 -- -- 89 --   10/06/23 1805 (!) 139/97 98.5  F (36.9  C) Oral 82 16   10/06/23 1404 (!) 149/95 -- -- 91 --   10/06/23 1038 (!) 149/92 99.1  F (37.3  C) Oral 88 16   10/06/23 0645 (!) 143/96 -- -- 83 --       Gen: Resting comfortably, NAD  CV: RR, well perfused  Resp: Non-labored breathing on room air  Abd: Soft, nontender, nondistended; incision with overlying steri strips, no surrounding erythema/drainage    HGB  Recent Labs   Lab 10/06/23  1042 10/04/23  2225 10/04/23  0744 10/03/23  0758   HGB 9.9* 9.5* 10.1* 9.7*       A/P:  Selena Dutton is a 33 year old  at 29w6d who is POD#5 s/p PLTCS for worsening blood pressures refractory to treatment in the setting of preeclampsia with severe features. Doing well from postoperative standpoint, working on BP control. Possible PM discharge pending continued stabilization of BPs.    # Preeclampsia w/ severe features:  - BP's consistently high MR overnight   - Continue to monitor and treat sustained severe range per protocol  - D#7 60 Nifed BID, D#3 labetalol 800 TID, Hydral 20 QID, HCTZ 25 daily, D#2 Enalapril 5 BID  - s/p 24h Mg  - AST 35> 65>24; HELLP wnl (10/4)  - IV labet 20, 40, 80 (0215, 0233, 0254) 20,40,80 (,,213) hydral 10 (), 10 (3)  - Strict I&O  - AST 65 o/w HELLP labs nl, repeat yesterday normal.  Repeat this AM stable  - Nephrology consult ordered for refractory HTN, recommended avoiding NSAIDs (currently held)  -reviewed risks of recurrent with preeclampsia.  Recommended that she  establish with PCP to help with long term BP management.      # Postpartum Care  - Pain: Tylenol, PRN oxy  - Heme: 10.2> EBL 1342 (TXA)> 9.7> 9.5 c/w postoperative ABLA; will discharge with oral iron.  - GI: Regular diet. Bowel regimen. Antiemetics PRN  - Rh positive, Rubella immune    # Asthma  - PTA Albuterol, singulair, flovent continued in house    # JAQUELIN/MDD  - PTA Zoloft   - Mood stable  - SW consult PRN    Dispo: Anticipate discharge today with BP follow up with OB this week.       Charlie Ghotra MD, MPH  Ob/Gyn Resident, PGY-3  10/07/23 6:41 AM     Physician Attestation   I saw this patient with the resident and agree with the resident/fellow's findings and plan of care as documented in the note.      Natasha Mitchell MD  Date of Service (when I saw the patient): 10/07/23       I spent a total of 25 minutes on the date of this encounter including preparing to see the patient (reviewing medical records/tests), counseling and discussing the plan of care, documenting the visit in the electronic medical record, and communicating with other health care professionals and/or care coordination.

## 2023-10-09 ENCOUNTER — TELEPHONE (OUTPATIENT)
Dept: PSYCHIATRY | Facility: CLINIC | Age: 33
End: 2023-10-09
Payer: COMMERCIAL

## 2023-10-09 NOTE — TELEPHONE ENCOUNTER
This writer left Selena a message after receiving referral from [facility], [facility], Maternal-Child Health . Selena had requested Adriana's assistance connecting with  psychotherapy services following unexpected premature delivery of her son at 29 weeks 3 days on 10/2/23. This writer introduced myself and provided direct contact information. Encouraged Selena to follow up with me directly with questions, concerns, or to schedule an initial appointment.     Althea Breaux, PhD, LP  Postdoctoral Clinical Psychology Fellow  Women's Wellbeing Program   Dept of Psychiatry and Behavioral Sciences  Direct line: [phone]

## 2023-10-10 DIAGNOSIS — F32.A ANXIETY AND DEPRESSION: ICD-10-CM

## 2023-10-10 DIAGNOSIS — J45.40 MODERATE PERSISTENT ASTHMA WITHOUT COMPLICATION: ICD-10-CM

## 2023-10-10 DIAGNOSIS — F41.9 ANXIETY AND DEPRESSION: ICD-10-CM

## 2023-10-10 RX ORDER — SERTRALINE HYDROCHLORIDE 100 MG/1
100 TABLET, FILM COATED ORAL DAILY
Qty: 90 TABLET | Refills: 1 | Status: SHIPPED | OUTPATIENT
Start: 2023-10-10 | End: 2024-04-08

## 2023-10-10 RX ORDER — MONTELUKAST SODIUM 10 MG/1
1 TABLET ORAL AT BEDTIME
Qty: 90 TABLET | Refills: 1 | Status: SHIPPED | OUTPATIENT
Start: 2023-10-10 | End: 2024-08-09

## 2023-10-10 NOTE — TELEPHONE ENCOUNTER
Routing refill request to provider for review/approval because:  Patient needs to be seen because it has been more than 1 year since last office visit.  Needs updated ACT  Gilda Ghosh RN, BSN  [facility]

## 2023-10-12 ENCOUNTER — TELEPHONE (OUTPATIENT)
Dept: PSYCHIATRY | Facility: CLINIC | Age: 33
End: 2023-10-12
Payer: COMMERCIAL

## 2023-10-12 NOTE — TELEPHONE ENCOUNTER
This writer returned Selena's voice mail offering intake appointment openings for week of 10/23. Encouraged her to contact me directly to schedule.     Althea Breaux, PhD, LP  Postdoctoral Clinical Psychology Fellow  Women's Wellbeing Program   Dept of Psychiatry and Behavioral Sciences  Direct line: [phone]

## 2023-10-13 ENCOUNTER — MYC MEDICAL ADVICE (OUTPATIENT)
Dept: OBGYN | Facility: CLINIC | Age: 33
End: 2023-10-13

## 2023-10-13 ENCOUNTER — ALLIED HEALTH/NURSE VISIT (OUTPATIENT)
Dept: OBGYN | Facility: CLINIC | Age: 33
End: 2023-10-13
Payer: COMMERCIAL

## 2023-10-13 VITALS — DIASTOLIC BLOOD PRESSURE: 66 MMHG | HEART RATE: 78 BPM | SYSTOLIC BLOOD PRESSURE: 112 MMHG

## 2023-10-13 DIAGNOSIS — Z01.30 BLOOD PRESSURE CHECK: Primary | ICD-10-CM

## 2023-10-13 PROCEDURE — 99211 OFF/OP EST MAY X REQ PHY/QHP: CPT

## 2023-10-13 NOTE — PROGRESS NOTES
Selena is here for PP BP check.    BP readings at home have been WNL  Selena is feeling well-is well aware of s/s that warrant emergent evaluation.    BP in clinic also WNL-baby is doing very well!  Incision healing well -informed ok to remove steri strips    Dr. Ghotra did come in person to see Selena-reviewed medications to discontinue.     Selena will call Monday with BP readings-will talk to Laly MURPHY who will then contact Dr. Ghotra.  Chiara Tracy RN on 10/13/2023 at 12:40 PM

## 2023-10-16 ENCOUNTER — MYC MEDICAL ADVICE (OUTPATIENT)
Dept: OBGYN | Facility: CLINIC | Age: 33
End: 2023-10-16
Payer: COMMERCIAL

## 2023-10-16 DIAGNOSIS — O16.3 SEVERE HYPERTENSION AFFECTING PREGNANCY IN THIRD TRIMESTER: ICD-10-CM

## 2023-10-16 DIAGNOSIS — Z98.891 S/P CESAREAN SECTION: ICD-10-CM

## 2023-10-16 NOTE — TELEPHONE ENCOUNTER
Labetalol 800 mg TID  Nifedipine 60 mg BID    Called pt as we have not received her BP readings after stopping other medications.  She said she feels fine. Slight dizziness on occasion but much better when on other meds.    Readings and info above was given to Dr Ghotra. Order to stop the Procardia completely and send in BP readings/check in on Thursday this week: 10/19    Called pt and informed to stop Procardia and continue labetalol as is. Readings/update on Thursday. She will put it in her calendar and send readings on Thursday or call if sx worsen again or BP's elevated or too low.    Pt verbalized understanding, in agreement with plan, and voiced no further questions.    Laly Isbell RN on 10/16/2023 at 3:00 PM

## 2023-10-16 NOTE — TELEPHONE ENCOUNTER
Type of Paperwork received:  disability and leave     Date Rcvd:  10/16/2023    Rcvd From (Company name): [employer]    Provider:  [facility]    Placed on Provider Cart Date:  10/16/2023

## 2023-10-17 NOTE — TELEPHONE ENCOUNTER
Pt needing forms completed by this week.  Routing to provider as BRITT Hayes RN on 10/17/2023 at 9:51 AM

## 2023-10-18 ENCOUNTER — NURSE TRIAGE (OUTPATIENT)
Dept: NURSING | Facility: CLINIC | Age: 33
End: 2023-10-18
Payer: COMMERCIAL

## 2023-10-18 NOTE — TELEPHONE ENCOUNTER
Patient calling reports being a few weeks postpartum with blood pressure of 149/ 103. Patient was treated for severe pre eclampsia and on Labetalol and Nifedipine for blood pressure. Patient reports discontinuing the Nifedipine yesterday. Patient denies any other symptoms. Paged Dr. Bambi Paris who advised patient restart the Nifedipine now and follow up with her provider tomorrow. Patient agreeable to the plan.     Kenya Miller RN 10/18/23 1:26 AM   [facility] Triage Nurse Advisor      Reason for Disposition   Systolic BP >= 140 OR Diastolic >= 90    Additional Information   Negative: Difficult to awaken or acting confused (e.g., disoriented, slurred speech)   Negative: SEVERE difficulty breathing (e.g., struggling for each breath, speaks in single words)   Negative: [1] Weakness of the face, arm or leg on one side of the body AND [2] new-onset   Negative: [1] Numbness (i.e., loss of sensation) of the face, arm or leg on one side of the body AND [2] new- onset   Negative: Seizure occurred and has stopped   Negative: Sounds like a life-threatening emergency to the triager   Negative: Other significant medical symptom is present, see that guideline (e.g., abdominal pain, chest pain, headache, leg swelling, vision changes)   Negative: [1] High blood pressure AND [2] NOT postpartum or pregnant   Negative: New hand or face swelling   Negative: Systolic BP >= 160 OR Diastolic >= 110   Negative: Patient sounds very sick or weak to the triager    Protocols used: Postpartum - High Blood Pressure-A-

## 2023-10-18 NOTE — TELEPHONE ENCOUNTER
Yes I still think should stop the Procardia as this is lowering her BP to much.  Please have her message us on Friday with BP.    Only call if BP >160 systolic or 110 diastolic

## 2023-10-18 NOTE — TELEPHONE ENCOUNTER
Pt informed of recommendations.   Will discontinue Nifedipine at this time.  Update on Friday with BPs-of course call with any concerns in the meantime  Chiara Tracy RN on 10/18/2023 at 9:15 AM

## 2023-10-18 NOTE — TELEPHONE ENCOUNTER
LMTCB  Chiara Tracy RN on 10/18/2023 at 8:36 AM    [employer] message has an attachment with Selena's BPs    Had been advised to discontinue Nifedipine 60 mg daily    Last evening had elevated BPs -Selena called and spoke with the nurse triage line and was then advised by Dr. Bambi Paris to take her Nifedipine 60 mg.   Selena did take the Nifedipine at 0120 and she is also still taking her Labetalol 800 mg every 8 hrs.    BP this morning 86/57 feeling somewhat light headed.   No other symptoms.     Will consult with Dr. Ghotra for med management.  Selena is aware of s/s that warrant emergent evaluation.  Chiara Tracy RN on 10/18/2023 at 8:58 AM

## 2023-10-19 ENCOUNTER — LACTATION ENCOUNTER (OUTPATIENT)
Age: 33
End: 2023-10-19

## 2023-10-19 NOTE — LACTATION NOTE
This note was copied from a baby's chart.  Lactation Follow Up Note    Reason for visit/ call:  Supply, comfort, wellness check.    Supply:  Selena was trying to pump 8x/d but with her hypertension/hypotension swings found this not sustainable, with supply decreasing, increased worries about lack of sleep, and other factors. We discussed rationale for pumping recommendations and supply goals, but also discussed role of stress and need for maternal self care and finding the right balance. Gave [facility] information. Discussed goal of not going more than 4 hours between pumping, and to pump as able, noting there will be good days and bad days. Parents are keeping spreadsheet for pumping log. She is getting 570ml/d, up from 480-500ml the days previous. She denies discomfort.    Significant changes (medications, equipment, comfort, etc):  Selena is having onging blood pressure concerns and is under the care of her provider.    Skin to skin/ nuzzling/ latching:  Encourage skin to skin holding.    Education given:  Supportive and reassuring conversation.    Plan:  Will continue to provide lactation support.       Miriam Stovall, RNC-RIVERA, IBCLC   Lactation Consultant  Ascom: *[phone]  Office: [phone]

## 2023-10-26 NOTE — TELEPHONE ENCOUNTER
Per Dr Ghotra,  Pt can stop BP medications at this time.  Pt updated  Bird Hayes RN on 10/26/2023 at 3:30 PM

## 2023-10-26 NOTE — TELEPHONE ENCOUNTER
Pt BP updates  Routing pt mychart message to provider to advise.  Bird Hayes RN on 10/26/2023 at 12:59 PM

## 2023-11-03 ENCOUNTER — LACTATION ENCOUNTER (OUTPATIENT)
Age: 33
End: 2023-11-03

## 2023-11-03 NOTE — LACTATION NOTE
This note was copied from a baby's chart.  Lactation Follow Up Note    Reason for visit/ call:  Support with latching    Supply:      Significant changes (medications, equipment, comfort, etc):      Skin to skin/ nuzzling/ latching:  Selena was holding Freyr in cross cradle hold on arrival to room, with one hand sandwiching the breast and the other hand supporting Freyr behind upper back. Carmela latched a few times with some suckling. When he fatigued showed Selena how to apply the nipple shield but he was then disinterested in latching again.    Education given:  Rationale behind nipple shield use    Plan:  Will continue to provide lactation support and assistance with latching    Cindy Moses RN, [facility]   Lactation Consultant  Ascom: *[phone]  Office: [phone]

## 2023-11-09 ENCOUNTER — LACTATION ENCOUNTER (OUTPATIENT)
Age: 33
End: 2023-11-09

## 2023-11-09 RX ORDER — LABETALOL 200 MG/1
200 TABLET, FILM COATED ORAL 2 TIMES DAILY
Qty: 720 TABLET | Refills: 0 | Status: SHIPPED | OUTPATIENT
Start: 2023-11-09 | End: 2024-10-06

## 2023-11-09 NOTE — LACTATION NOTE
This note was copied from a baby's chart.  Lactation Follow Up Note    Reason for visit/ call:  Check in/BF support/supply check    Supply:  Pumping 4-5 times per day, expresses 450-575 mL per day, feels comfortable   Reports getting chest freezer delivered today for milk storage    Significant changes (medications, equipment, comfort, etc):  N/a    Skin to skin/ nuzzling/ latching:  Attempting to BF occasionally (once per day?), reports it is going well-most recent test weight showed a transfer of 14 mL per mother    Education given:  Pumping frequency, support supply, BF attempts, lactation support   Mother was working with OT during lactation consult, will call for latch support as needed     Plan:  Continue to pump regularly  Mother will call for latch support as needed     Estela Latif RN, IBCLC   Lactation Consultant  Ascom: [phone]  Office: [phone]

## 2023-11-09 NOTE — LACTATION NOTE
This note was copied from a baby's chart.  Lactation Follow Up Note    Reason for visit/ call:  BF support/BF2    Supply:  N/a    Significant changes (medications, equipment, comfort, etc):  N/a    Skin to skin/ nuzzling/ latching:  Breastfeeding in football hold, baby is off and on the breast, milk easily is flowing. LC reviews indications for NS use, with NS baby is able to sustain latch more effectively. Due to end of shift LC is not able to stay for post-feeding weight, however test weight is completed this feeding.     Education given:  BF positions, signs of a good latch, NS use     Plan:  Continue to pump regularly, BF as able focus on good positioning, use NS to aid latch if needed     Estela Latif RN, IBCLC   Lactation Consultant  Ascom: *[phone]  Office: [phone]

## 2023-11-15 ENCOUNTER — ALLIED HEALTH/NURSE VISIT (OUTPATIENT)
Dept: OBGYN | Facility: CLINIC | Age: 33
End: 2023-11-15
Payer: COMMERCIAL

## 2023-11-15 VITALS — DIASTOLIC BLOOD PRESSURE: 83 MMHG | HEART RATE: 86 BPM | SYSTOLIC BLOOD PRESSURE: 119 MMHG

## 2023-11-15 DIAGNOSIS — Z01.30 BLOOD PRESSURE CHECK: Primary | ICD-10-CM

## 2023-11-15 PROCEDURE — 99207 PR NO CHARGE NURSE ONLY: CPT

## 2023-11-15 NOTE — TELEPHONE ENCOUNTER
Let's have her continue current dosage.    Has she established with a PCP at all. I think given prolonged need for BP medication it would be a good idea.

## 2023-11-15 NOTE — PROGRESS NOTES
Selena is here for her post partum RN visit for a BP check due to severe PreE    C/S 10/2/23 29w 6d    Feeling well-no concerns, baby Carmela is still in the NICU but doing great!    Is well aware of s/s that warrant evaluation.    BP in clinic 119/83    Current meds Labetalol 200 mg BID    MyChart message sent with home BP readings.     Prefers MyChart message with follow up recommendations.     Chiara Tracy RN on 11/15/2023 at 11:13 AM

## 2023-11-18 ENCOUNTER — LACTATION ENCOUNTER (OUTPATIENT)
Age: 33
End: 2023-11-18

## 2023-11-19 ENCOUNTER — LACTATION ENCOUNTER (OUTPATIENT)
Age: 33
End: 2023-11-19

## 2023-11-19 NOTE — LACTATION NOTE
This note was copied from a baby's chart.  I met with mom for discharge teaching (see prior note for topics) and gave her pertinent handouts.  Teaching completed, all questions answered and she verbalizes readiness to go home.  Encouraged seeking outpatient support as needed.    Miriam Stovall, RNC-RIVERA, IBCLC   Lactation Consultant  Ascom: [phone]  Office: [phone]

## 2023-11-19 NOTE — LACTATION NOTE
This note was copied from a baby's chart.  Follow UP Lactation:  Reason: check milk supply, answer questions for infant's Mother  Called by infant's primary nurse in NICU for a check in from lactation.    Pumping volume is 440 mL to 560 mL / day (15 - 18 ounces daily). Pumping frequency is 3-4 times daily. Family has 20 pounds of frozen breastmilk at home they had to just buy an extra freezer for. Currently Selena is about 1 month ahead of her infant in terms of breast milk production! Infant is taking 41 mL via bottle for feedings and Selena is aware of calling lactation for support as needed if latching.    Her infant is 47 days of age and they will probably discharge home instead of transferring to [facility] prior to being discharged to home as previously thought.    Selena was feeding her infant a bottle of her own breast milk during visit.  She didn't have any specific concerns or questions, and is comfortable with pumping.    Reviewed possibility of needing more breastmilk as her infant continues to gain weight through his first year, however, she may have enough pumped to make up for the slight difference in volume her infant will want as he ages. Selena is aware of this and understands that she can increase her supply as desired with 1 - 2 extra pumping sessions per day if she needs in the future.    Blaire Dahl RN, IBCLC on 11/18/2023 at 6:23 PM

## 2023-11-19 NOTE — LACTATION NOTE
"This note was copied from a baby's chart.  LACTATION DISCHARGE INSTRUCTIONS      Congratulations on your approaching discharge day!  Our goal is to help you have all the information, skills and equipment you need to help you meet your lactation goals at home.        CDC HANDOUT ON STORING AND PREPARING HUMAN MILK AT HOME    Please see attached handout   https://www.cdc.gov/breastfeeding/recommendations/handling_breastmilk.htm      FEEDING LOG: BABY'S FIRST WEEK, SECOND WEEK AND BEYOND    Please see attached feeding logs  Goal is to eat at least 8 times in 24 hours  Goal is to have at least 6 wet diapers in 24 hours  Talk to your provider about goal for soiled diapers.  Each baby is different depending on age and what they are eating      OTHER DISCHARGE INFORMATION    Medications:   Some women may find certain types of hormonal birth control, decongestants or antihistamines may impact supply-- talk to your provider.  Always get a second opinion from a lactation consultant or a provider familiar with lactation if told to stop latching or \"pump and dump\" when starting a new medication, having a procedure or you are ill; most of the time things are compatible.      TRANSITIONING TO MORE FEEDINGS AT HOME    Often, babies go home from the NICU doing a combination of breastfeeding and bottle feeding.  With time and patience, most will go on to nurse most or all their feedings.  infants, in particular, may not be able to fully nurse until at or after their due date. To ensure your baby is taking adequate volumes, some babies may need supplemental bottle after breastfeeding. Keep these things in mind as you nurse your baby at home:    Good time management is key!  Make feedings efficient so you have time to eat, sleep, and pump.    It is important to latch your baby frequently, even if he or she is taking small amounts. Staying skin to skin will also help keep your baby \"breast oriented\".  Going days without latching " will make it more difficult.  Babies can be re-taught how to latch, but this is very time consuming and not always successful.        Please see a lactation consultant ASAP if you are not meeting your latching goal.  It is easier to make changes now, versus weeks or months down the road.      HOW TO WEAN FROM THE NIPPLE SHIELD    Many NICU babies use a nipple shield for a period of time, especially premature babies and babies recovering from illness or surgery.  It helps them stay latched on and get milk more easily.    How do you know it's time to try off the nipple shield?    Your baby is waking on their own before feedings  Your baby is able to stay awake during the entire feeding, without a lot of encouragement to stay awake  Your baby's suck is significantly stronger   Your baby is taking full feedings at the breast  Typically, at or after their due date    How do I wean off the nipple shield?    Start the feeding with the nipple shield in place, then take the nipple shield off alf through the feeding and re-latch  Try at feedings where your baby is calm, not when they are frantically hungry  Middle of the night can be a good time to try, when everyone is relaxed  https://www.[employer]De[employer]PointData/blog/my-ten-step-process-for-weaning-from-the-nipple-shield/    How do I know my baby is eating well without the nipple shield?    They seem satisfied after feeding  Your breasts feels softer after the feeding  Your baby has enough wet and soiled diapers  If using a breastfeeding scale-- the numbers on the scale are similar to a feeding with a nipple shield  If you have problems getting off the nipple shield, please make an appointment with a lactation consultant.      HOW TO WEAN FROM THE PUMP (AFTER YOUR BABY TAKES A FULL BREASTFEEDING)    Your milk supply may be greater than what your baby needs after discharge. It is important that you gradually wean from pumping after your baby takes a full breastfeeding  "(without needing a top-off).  If you wean too quickly, you will be uncomfortable and you run the risk of causing your supply to drop.    If you have been pumping less than two weeks:    If you are uncomfortable after a full breastfeeding, pump only until you are comfortable (versus pumping until empty)      If you have been pumping two weeks or more:    Continue to pump after every breastfeeding, but gradually decrease the time or volume you pump.   Example based on time: If you have been pumping 20 minutes after each full breastfeeding, decrease to 18 minutes for two days. If still comfortable, decrease to 16 minutes for another two days.   Example based on volume: If you normally pump 2 oz after a feeding, pump 1.75 oz for a few days, 1.5 oz for a few days, etc  Continue this way until you no longer need to pump (after breastfeeding).    Remember that if you are bottle feeding some feedings, you need to pump at the time you would have latched your baby. If you do not, you might start decreasing your milk supply.        OTHER LATCHING INFORMATION    Growth Spurts: Common times for \"growth spurts\" are around 7-10 days, 2-3 weeks, 4-6 weeks, 3 months, 4 months, 6 months and 9 months, but these vary widely between babies.  During these times allow your baby to nurse very frequently (or pump more frequently) to temporarily boost your supply, as opposed to supplementing.  It should pass in a few days when your supply increases, and your baby will settle into a new feeding pattern.    How to get a breastfeeding test weight scale:   Rental (2ml sensitivity):   [facility] ([facility]) [phone]   [facility] ([facility]) [phone]  [facility] [phone]   Purchase scale (6ml sensitivity):   \"Lott Baby Scale\" (Tamion, Amazon, etc), around $150      LACTATION SUPPORT    [place] [facility] [phone]     Romana Beaver, KURT, CNM, IBCLC  Tuesday:  [facility], " " 8:30 - 5:00  Wednesday:  [facility], 7th floor, 8:30 - 4:00  Thursday:  [facility], [facility], 8:30 - 4:00    [facility]:  [phone] or [phone]    [facility]:  [phone]    [facility]:  [phone]    [facility]:  [phone]    [facility]:  [phone]    [facility] [phone]    [facility]:  [phone] or [phone]    [facility]:  [phone]    [facility]:  [phone]    [facility]:  [phone]    [facility]/Carli/Naina or [facility]:  [phone]      Other Lactation Help:  [facility]/ [place] (Tues/Wed)     [phone]  [place] Baby Weigh In (various times and locations)    www.[employer] ++HAS VIRTUAL SUPPORT++   Enlightened Mama   www.[employer] [phone]  Everyday Miracles         https://www.everyday-miracles.org/  [facility]     [phone] ++HAS VIRTUAL SUPPORT++   Chiquita Webb DO, MPH, ABOIM, IBCLC  Integrative Family Medicine Physician/Breastfeeding Medicine/ Home visits  www.[employer]  [phone]  [facility] \"Well Fed\" postpartum group ([facility])   [phone]      Telephone and Online Support    [facility] ++HAS VIRTUAL SUPPORT++ (call for eligibility information)   [phone]    Baby[employer]afes (www.babycafeusa.org) (now in person)    [employer]   ++HAS VIRTUAL SUPPORT++  www.[facility].org  9-905-3-LA-LECHE ([phone])  Local referral line [phone]  Si quieres jake en espanol con nica pecho por favor nithya barrios [phone].    Rose-- up to date lactation " information  Www.[employer] Solutions    International Breastfeeding Downs ([place]nie)  Http://ibconline.ca/    The Infant[facility] Call Center is available to answer questions about the use of medications during pregnancy and while breastfeeding  [phone]  www.infantGraphene Energy.[employer] Solutions     Office on Women's Health National Breastfeeding Help Line  8am to 5pm, English and Bermudian [phone] option 1    https://www.womenshealth.gov/breastfeeding/ Zqec9Lcvk Dae (free on [employer] dae store or Google Play)    LactMed Dae (free on [employer] dae store or Google Play) LactMed is available online at https://toxnet.nlm.nih.gov/newtoxnet/lactmed.htm and is now available on your mobile device. The free LactMed Dae for iPhone/Nibu Touch and Android can be downloaded at http://toxnet.nlm.nih.gov/help/lactmedapp.htm.

## 2023-12-01 ENCOUNTER — PRENATAL OFFICE VISIT (OUTPATIENT)
Dept: OBGYN | Facility: CLINIC | Age: 33
End: 2023-12-01
Payer: COMMERCIAL

## 2023-12-01 VITALS
BODY MASS INDEX: 21.25 KG/M2 | DIASTOLIC BLOOD PRESSURE: 79 MMHG | SYSTOLIC BLOOD PRESSURE: 118 MMHG | HEIGHT: 67 IN | WEIGHT: 135.4 LBS

## 2023-12-01 DIAGNOSIS — Z30.430 ENCOUNTER FOR INSERTION OF INTRAUTERINE CONTRACEPTIVE DEVICE: Primary | ICD-10-CM

## 2023-12-01 PROCEDURE — 58300 INSERT INTRAUTERINE DEVICE: CPT | Performed by: OBSTETRICS & GYNECOLOGY

## 2023-12-01 ASSESSMENT — ANXIETY QUESTIONNAIRES
6. BECOMING EASILY ANNOYED OR IRRITABLE: SEVERAL DAYS
3. WORRYING TOO MUCH ABOUT DIFFERENT THINGS: NOT AT ALL
5. BEING SO RESTLESS THAT IT IS HARD TO SIT STILL: NOT AT ALL
IF YOU CHECKED OFF ANY PROBLEMS ON THIS QUESTIONNAIRE, HOW DIFFICULT HAVE THESE PROBLEMS MADE IT FOR YOU TO DO YOUR WORK, TAKE CARE OF THINGS AT HOME, OR GET ALONG WITH OTHER PEOPLE: NOT DIFFICULT AT ALL
7. FEELING AFRAID AS IF SOMETHING AWFUL MIGHT HAPPEN: NOT AT ALL
1. FEELING NERVOUS, ANXIOUS, OR ON EDGE: SEVERAL DAYS
GAD7 TOTAL SCORE: 2
2. NOT BEING ABLE TO STOP OR CONTROL WORRYING: NOT AT ALL
GAD7 TOTAL SCORE: 2

## 2023-12-01 ASSESSMENT — PATIENT HEALTH QUESTIONNAIRE - PHQ9
SUM OF ALL RESPONSES TO PHQ QUESTIONS 1-9: 2
5. POOR APPETITE OR OVEREATING: NOT AT ALL

## 2023-12-01 NOTE — PROGRESS NOTES
"SUBJECTIVE:  Selena Dutton,  is here for a postpartum visit.  She had a  Section  on 10/02/2023 delivering a healthy baby boy, named Freyer weighing 2 lbs 6.8 oz at term.      HPI:  Patient feels she has been recovering well.   Has appointment with internal medicine in February  Still taking labetalol 300mg BID  BP at home 130-140/80's  She is also planning to have IUD today. She has had consult in the past to reviewed pro's/con's and she desires Mirena IUD.    Last PHQ-9 score on record=       2023    11:27 AM   PHQ-9 SCORE   PHQ-9 Total Score 2         2023     2:05 PM 5/10/2023    11:13 AM 2023    11:27 AM   JAQUELIN-7 SCORE   Total Score 3 (minimal anxiety) 3 (minimal anxiety)    Total Score 3 3 2       Pap:   Lab Results   Component Value Date    PAP NILM 2022       Delivery complications:  No  Breast feeding:  Yes, and pumping. Pumping more than breast feeding  Bladder problems:  No  Bowel problems/hemorrhoids:  No  Episiotomy/laceration/incision healed? N/A  Vaginal flow:  None  [place]ount:  No  Contraception: none and Getting IUD inserted today   Emotional adjustment:  doing well and happy  Back to work:  2024    12 point review of systems negative other than symptoms noted below or in the HPI.    OBJECTIVE:  Vitals: /79   Ht 1.702 m (5' 7\")   Wt 61.4 kg (135 lb 6.4 oz)   LMP 2023 (Exact Date)   Breastfeeding Yes   BMI 21.21 kg/m    BMI= Body mass index is 21.21 kg/m .  General - pleasant female in no acute distress.  Breast -  symmetric and no skin changes  Abdomen - Incision well-healed  Pelvic - EG: normal adult female, BUS: within normal limits, Vagina: well rugated, no discharge, Cervix: no lesions or CMT, Uterus: firm, normal sized and nontender, anteverted in position. Adnexae: no masses or tenderness.  Rectovaginal - deferred.    ASSESSMENT:    ICD-10-CM    1. Encounter for insertion of intrauterine contraceptive device  Z30.430 levonorgestrel " "(MIRENA) 52 MG (20 mcg/day) IUD 1 each     INSERTION INTRAUTERINE DEVICE          PLAN:  May resume normal activities without restrictions.  Pap smear was not done today.    Full counseling was provided, and all questions were answered.   Return to clinic in one year for an annual visit.     There are no Patient Instructions on file for this visit.  Hazel Ghotra MD          IUD Insertion:  CONSULT:    Is a pregnancy test required: No.  Was a consent obtained?  Yes    Subjective: Selena Dutton is a 33 year old  presents for IUD and desires Mirena type IUD.    Patient has been given the opportunity to ask questions about all forms of birth control, including all options appropriate for Selena Dutton. Discussed that no method of birth control, except abstinence is 100% effective against pregnancy or sexually transmitted infection.     Selena Dutton understands she may have the IUD removed at any time. IUD should be removed by a health care provider.    The entire insertion procedure was reviewed with the patient, including care after placement.    Patient's last menstrual period was 2023 (exact date). Last sexual activity: prior to delivery . No allergy to betadine or shellfish. Recent STD screening  No results found for: \"HCG\"      /79   Ht 1.702 m (5' 7\")   Wt 61.4 kg (135 lb 6.4 oz)   LMP 2023 (Exact Date)   Breastfeeding Yes   BMI 21.21 kg/m      Pelvic Exam:   EG/BUS: normal genital architecture without lesions, erythema or abnormal secretions.   Vagina: moist, pink, rugae with physiologic discharge and secretions  Cervix:  no lesions and pink, moist, closed, without lesion or CMT  Uterus: anteverted position, mobile, no pain  Adnexa: within normal limits and no masses, nodularity, tenderness    PROCEDURE NOTE: -- IUD Insertion    Reason for Insertion: contraception    Patient forgot to premedicate  Under sterile technique, cervix was visualized with speculum and prepped with " Betadine solution swab x 3. Tenaculum was placed for stability. The uterus was gently straightened and sounded to 6.5 cm. IUD prepared for placement, and IUD inserted according to 's instructions without difficulty or significant resitance, and deployed at the fundus. The strings were visualized and trimmed to 3.0 cm from the external os. Tenaculum was removed and hemostasis noted. Speculum removed.  Patient tolerated procedure well.    Lot # [id number]   Exp: 10/31/2025      EBL: minimal    Complications: none    ASSESSMENT:     ICD-10-CM    1. Encounter for insertion of intrauterine contraceptive device  Z30.430 levonorgestrel (MIRENA) 52 MG (20 mcg/day) IUD 1 each     INSERTION INTRAUTERINE DEVICE           PLAN:    Given 's handouts, including when to have IUD removed, list of danger s/sx, side effects and follow up recommended. Encouraged condom use for prevention of STD. Back up contraception advised for 7 days if progestin method. Advised to call for any fever, for prolonged or severe pain or bleeding, abnormal vaginal discharge, or unable to palpate strings. She was advised to use pain medications (ibuprofen) as needed for mild to moderate pain. Advised to follow-up in clinic in 4-6 weeks for IUD string check if unable to find strings or as directed by provider.     Hazel Ghotra MD

## 2024-01-11 ENCOUNTER — TELEPHONE (OUTPATIENT)
Dept: PEDIATRICS | Facility: CLINIC | Age: 34
End: 2024-01-11
Payer: COMMERCIAL

## 2024-01-11 NOTE — TELEPHONE ENCOUNTER
Reason for call:  Other   Patient called regarding (reason for call): appointment    Additional comments:   PT needs a medication check in and had a baby in Oct.  Seeking [facility] appt.    Phone number to reach patient:  Home number on file [phone] (home)    Best Time:  any    Can we leave a detailed message on this number?  YES    Travel screening: Not Applicable

## 2024-01-26 ASSESSMENT — ENCOUNTER SYMPTOMS
PALPITATIONS: 0
SORE THROAT: 0
HEADACHES: 0
ABDOMINAL PAIN: 0
ARTHRALGIAS: 0
HEMATURIA: 0
SHORTNESS OF BREATH: 0
FEVER: 0
FREQUENCY: 0
HEMATOCHEZIA: 0
PARESTHESIAS: 0
DIZZINESS: 0
MYALGIAS: 0
BREAST MASS: 0
CONSTIPATION: 0
JOINT SWELLING: 0
HEARTBURN: 0
COUGH: 0
WEAKNESS: 0
NAUSEA: 0
NERVOUS/ANXIOUS: 0
DIARRHEA: 0
DYSURIA: 0
EYE PAIN: 0
CHILLS: 0

## 2024-01-26 ASSESSMENT — ASTHMA QUESTIONNAIRES
QUESTION_3 LAST FOUR WEEKS HOW OFTEN DID YOUR ASTHMA SYMPTOMS (WHEEZING, COUGHING, SHORTNESS OF BREATH, CHEST TIGHTNESS OR PAIN) WAKE YOU UP AT NIGHT OR EARLIER THAN USUAL IN THE MORNING: ONCE OR TWICE
QUESTION_4 LAST FOUR WEEKS HOW OFTEN HAVE YOU USED YOUR RESCUE INHALER OR NEBULIZER MEDICATION (SUCH AS ALBUTEROL): TWO OR THREE TIMES PER WEEK
QUESTION_2 LAST FOUR WEEKS HOW OFTEN HAVE YOU HAD SHORTNESS OF BREATH: ONCE OR TWICE A WEEK
ACT_TOTALSCORE: 20
QUESTION_1 LAST FOUR WEEKS HOW MUCH OF THE TIME DID YOUR ASTHMA KEEP YOU FROM GETTING AS MUCH DONE AT WORK, SCHOOL OR AT HOME: NONE OF THE TIME
ACT_TOTALSCORE: 20
QUESTION_5 LAST FOUR WEEKS HOW WOULD YOU RATE YOUR ASTHMA CONTROL: WELL CONTROLLED

## 2024-02-02 ENCOUNTER — OFFICE VISIT (OUTPATIENT)
Dept: PEDIATRICS | Facility: CLINIC | Age: 34
End: 2024-02-02
Payer: COMMERCIAL

## 2024-02-02 VITALS
SYSTOLIC BLOOD PRESSURE: 120 MMHG | WEIGHT: 135.6 LBS | DIASTOLIC BLOOD PRESSURE: 60 MMHG | HEART RATE: 74 BPM | BODY MASS INDEX: 21.79 KG/M2 | OXYGEN SATURATION: 96 % | HEIGHT: 66 IN

## 2024-02-02 DIAGNOSIS — F41.9 ANXIETY AND DEPRESSION: ICD-10-CM

## 2024-02-02 DIAGNOSIS — J45.40 MODERATE PERSISTENT ASTHMA WITHOUT COMPLICATION: ICD-10-CM

## 2024-02-02 DIAGNOSIS — Z23 NEED FOR VACCINATION: ICD-10-CM

## 2024-02-02 DIAGNOSIS — Z91.010 PEANUT ALLERGY: ICD-10-CM

## 2024-02-02 DIAGNOSIS — O14.13 SEVERE PRE-ECLAMPSIA IN THIRD TRIMESTER: ICD-10-CM

## 2024-02-02 DIAGNOSIS — Z00.00 ANNUAL PHYSICAL EXAM: Primary | ICD-10-CM

## 2024-02-02 DIAGNOSIS — F32.A ANXIETY AND DEPRESSION: ICD-10-CM

## 2024-02-02 DIAGNOSIS — O13.3 GESTATIONAL HYPERTENSION, THIRD TRIMESTER: ICD-10-CM

## 2024-02-02 PROCEDURE — 99213 OFFICE O/P EST LOW 20 MIN: CPT | Mod: 25 | Performed by: NURSE PRACTITIONER

## 2024-02-02 PROCEDURE — 91320 SARSCV2 VAC 30MCG TRS-SUC IM: CPT | Performed by: NURSE PRACTITIONER

## 2024-02-02 PROCEDURE — 90480 ADMN SARSCOV2 VAC 1/ONLY CMP: CPT | Performed by: NURSE PRACTITIONER

## 2024-02-02 PROCEDURE — 99395 PREV VISIT EST AGE 18-39: CPT | Mod: 25 | Performed by: NURSE PRACTITIONER

## 2024-02-02 PROCEDURE — 90677 PCV20 VACCINE IM: CPT | Performed by: NURSE PRACTITIONER

## 2024-02-02 PROCEDURE — 90471 IMMUNIZATION ADMIN: CPT | Performed by: NURSE PRACTITIONER

## 2024-02-02 RX ORDER — EPINEPHRINE 0.3 MG/.3ML
0.3 INJECTION SUBCUTANEOUS PRN
Qty: 2 EACH | Refills: 3 | Status: SHIPPED | OUTPATIENT
Start: 2024-02-02

## 2024-02-02 ASSESSMENT — ENCOUNTER SYMPTOMS
DIARRHEA: 0
EYE PAIN: 0
MYALGIAS: 0
FEVER: 0
WEAKNESS: 0
ARTHRALGIAS: 0
NAUSEA: 0
CHILLS: 0
HEADACHES: 0
JOINT SWELLING: 0
ABDOMINAL PAIN: 0
PALPITATIONS: 0
HEMATURIA: 0
SHORTNESS OF BREATH: 0
SORE THROAT: 0
FREQUENCY: 0
CONSTIPATION: 0
DIZZINESS: 0
DYSURIA: 0
NERVOUS/ANXIOUS: 0
COUGH: 0

## 2024-02-02 NOTE — PROGRESS NOTES
Preventive Care Visit  [facility] KURT Sanchez CNP, Family Medicine  Feb 2, 2024    Assessment & Plan     Annual physical exam  Reviewed and updated problem list, medications, and allergies. Reviewed age appropriate screenings and preventative maintenance items, ordered as indicated. PAP due next in 2027. Not fasting today, consider lipid panel at next annual visit. Appropriate vaccines ordered today. Encouraged continued healthy diet and lifestyle. Encouraged annual eye exam and biannual dentals.     Severe pre-eclampsia in third trimester  Gestational hypertension, third trimester  See HPI for detailed history. BP previously managed by OB, will transition management to primary care moving forward. Continues on Labetalol 200 mg BID with home Bps between 120-140's/ 80-90's. Goal BP <130/80. Tolerating medication well. Discussed that BP could return back to baseline over time or might be new normal to be elevated. Labetalol not typically first line in HTN treatment but is safe to use in breastfeeding and pregnancy. Patient indicates desire of future pregnancies, will possibly start trying to conceive in a year. Shared decision making resulted in continuation of current medication. Continue to monitor Bps at home, follow up in 4-6 weeks with e-visit. Could consider dose reduction if Bps decreasing or of course should she develop symptoms of hypotension.   - OFFICE/OUTPT VISIT,CARLITA JOSHUA III     Need for vaccination  Completed today.   - Pneumococcal 20 Valent Conjugate (Prevnar 20)  - COVID-19 12+ (2023-24) (PFIZER)    Peanut allergy  Anaphylactic reaction, refill epipens.   - EPINEPHrine (ANY BX GENERIC EQUIV) 0.3 MG/0.3ML injection 2-pack  Dispense: 2 each; Refill: 3    Anxiety and depression  Stable. Sertraline since 2014. Continue     Moderate persistent asthma without complication  Stable. Continue Montelukast, fluticasone, and albuterol as needed.         20 minutes spent by me on the  date of the encounter doing chart review, review of test results, interpretation of tests, patient visit, documentation, and discussion with other provider(s)       Counseling  Appropriate preventive services were discussed with this patient, including applicable screening as appropriate for fall prevention, nutrition, physical activity, Tobacco-use cessation, weight loss and cognition.  Checklist reviewing preventive services available has been given to the patient.  Reviewed patient's diet, addressing concerns and/or questions.   The patient was instructed to see the dentist every 6 months.         See Patient Instructions    Angela Troncoso RN, BAN, [facility] FNP Student saw and evaluated the above patient along with myself. Note co-authored with student, I agree with the history, physical exam findings, and the assessment and plan.       Margarita Walters is a 33 year old, presenting for the following:  Physical       BP was normal (Stable) up until week before baby born.     MFM BP elevated: 200s+, no symptoms; swelling, legs and hands, H/As. Was admitted for ongoing monitoring and BP treatment. Baby boy born on 10/2 at 29w via . Started on mag infusion and developed mag toxicity. Was transferred to [facility] ICU. Remained in ICU for 3 days. Ended up requiring multiple BP agents and discharged on the followin mg of nifedipine XL BID, labetalol 800 mg TID, hydralazine 20 mg QID, HCTZ 25 mg daily and Enalapril 5 mg BID).  Between 10/7 and now was able to wean down to 200 mg Labetalol BID.  Home Bps range 120-140/ 84-96; random      Had Mirena placed- going well    PAP due    Back to work   Baby home with dad   Breastfeeding + pumping  Sleeping well   Incision healed well      No concerns today.     Healthy Habits:     Getting at least 3 servings of Calcium per day:  NO    Bi-annual eye exam:  NO    Dental care twice a year:  NO    Sleep apnea or symptoms of sleep apnea:  None    Diet:  Regular  (no restrictions)    Frequency of exercise:  1 day/week    Duration of exercise:  Less than 15 minutes    Taking medications regularly:  Yes    Medication side effects:  None    Additional concerns today:  Yes        1/26/2024   Social Factors   Worry food won't last until get money to buy more No   Food not last or not have enough money for food? No   Do you have housing?  Yes   Are you worried about losing your housing? No   Lack of transportation? No   Unable to get utilities (heat,electricity)? No       Today's PHQ-2 Score:       2/1/2024    12:10 PM   PHQ-2 ( 1999 Pf[employer])   Q1: Little interest or pleasure in doing things 0   Q2: Feeling down, depressed or hopeless 0   PHQ-2 Score 0   Q1: Little interest or pleasure in doing things Not at all   Q2: Feeling down, depressed or hopeless Not at all   PHQ-2 Score 0           1/26/2024   Substance Use   Alcohol more than 3/day or more than 7/wk No     Social History     Tobacco Use    Smoking status: Never    Smokeless tobacco: Never   Vaping Use    Vaping Use: Never used   Substance Use Topics    Alcohol use: Yes     Comment: 3 drinks/week    Drug use: Never          Patient under 40 years of age: Routine Mammogram Screening not recommended.       History of abnormal Pap smear: NO - age 30-65 PAP every 5 years with negative HPV co-testing recommended        Latest Ref Rng & Units 6/1/2022     3:06 PM   PAP / HPV   PAP  Negative for Intraepithelial Lesion or Malignancy (NILM)    HPV 16 DNA Negative Negative    HPV 18 DNA Negative Negative    Other HR HPV Negative Negative             No data to display                 Reviewed and updated as needed this visit by Provider    Allergies  Meds    Surg Hx  Fam Hx   Sexual Activity          Review of Systems   Constitutional:  Negative for chills and fever.   HENT:  Negative for congestion, ear pain, hearing loss and sore throat.    Eyes:  Negative for pain and visual disturbance.   Respiratory:  Negative for cough and  "shortness of breath.    Cardiovascular:  Negative for chest pain and palpitations.   Gastrointestinal:  Negative for abdominal pain, constipation, diarrhea and nausea.   Genitourinary:  Negative for dysuria, frequency, genital sores, hematuria, pelvic pain, urgency, vaginal bleeding and vaginal discharge.   Musculoskeletal:  Negative for arthralgias, joint swelling and myalgias.   Skin:  Negative for rash.   Neurological:  Negative for dizziness, weakness and headaches.   Psychiatric/Behavioral:  The patient is not nervous/anxious.         Objective    Exam  /60 (BP Location: Right arm, Patient Position: Sitting, Cuff Size: Adult Regular)   Pulse 74   Ht 1.676 m (5' 6\")   Wt 61.5 kg (135 lb 9.6 oz)   SpO2 96%   Breastfeeding Yes   BMI 21.89 kg/m     Estimated body mass index is 21.89 kg/m  as calculated from the following:    Height as of this encounter: 1.676 m (5' 6\").    Weight as of this encounter: 61.5 kg (135 lb 9.6 oz).    Physical Exam  GENERAL: alert and no distress  EYES: Eyes grossly normal to inspection, PERRL and conjunctivae and sclerae normal  NECK: no adenopathy, no asymmetry, masses, or scars  RESP: lungs clear to auscultation - no rales, rhonchi or wheezes  CV: regular rate and rhythm, normal S1 S2, no S3 or S4, no murmur, click or rub, no peripheral edema  ABDOMEN: soft, nontender, no hepatosplenomegaly, no masses and bowel sounds normal  MS: no gross musculoskeletal defects noted, no edema  SKIN: no suspicious lesions or rashes, few acne comedones   NEURO: Normal strength and tone, mentation intact and speech normal  PSYCH: mentation appears normal, affect normal/bright  LYMPH: no cervical, supraclavicular, axillary, or inguinal adenopathy      Signed Electronically by: KURT Tomas CNP    "

## 2024-02-09 ENCOUNTER — E-VISIT (OUTPATIENT)
Dept: PEDIATRICS | Facility: CLINIC | Age: 34
End: 2024-02-09
Payer: COMMERCIAL

## 2024-02-09 ENCOUNTER — MYC MEDICAL ADVICE (OUTPATIENT)
Dept: PEDIATRICS | Facility: CLINIC | Age: 34
End: 2024-02-09
Payer: COMMERCIAL

## 2024-02-09 ENCOUNTER — E-VISIT (OUTPATIENT)
Dept: OBGYN | Facility: CLINIC | Age: 34
End: 2024-02-09
Payer: COMMERCIAL

## 2024-02-09 ENCOUNTER — TELEPHONE (OUTPATIENT)
Dept: OBGYN | Facility: CLINIC | Age: 34
End: 2024-02-09

## 2024-02-09 DIAGNOSIS — N64.4 BREAST PAIN: Primary | ICD-10-CM

## 2024-02-09 DIAGNOSIS — N61.0 MASTITIS: Primary | ICD-10-CM

## 2024-02-09 PROCEDURE — 99207 PR NO BILLABLE SERVICE THIS VISIT: CPT | Performed by: OBSTETRICS & GYNECOLOGY

## 2024-02-09 PROCEDURE — 99207 PR NON-BILLABLE SERV PER CHARTING: CPT

## 2024-02-09 NOTE — TELEPHONE ENCOUNTER
"\"Due: Tomorrow Received: Today  Bakari Weems MD  P Ea Triage  Phone Number: [phone]     I usually have them connect with OB but if she can't get in touch w/ them lmk and I'll do my best to help. KMB \"           Called patient and relayed provider message above. Pt verbalized understanding and agrees with plan. Pt established with OBGYN Dr. Ghotra at the [facility]. Pt states she will contact her OBGYN team and let us know if needs further assistance.     Stefano Rockwell RN on 2/9/2024 at 9:12 AM    "

## 2024-02-09 NOTE — TELEPHONE ENCOUNTER
I think I have mastitis. Symptoms include pain and a lump in my left breast, chills, tingly skin, exhaustion, heart fluttering. I am trying to express more often while using a heat pack. The clogged duct started Wednesday and the other symptoms started yesterday.     Above taken from Evisit  Called pt regarding her EVisit per Dr Ghotra    Pain at the left breast at the 10 o'clock position where nickel size lump is located started on Wednesday  Redness at sight  Yesterday/Thursday started body aches/chills, fatigue, feeling off  Thought maybe her BP that has been normal.  Has only done heat and Tylenol  On Labetalol so is not sure she can take Ibuprofen    Does not feel like she has a fever and is at work and unable to take her temp    Only pumps 3x/day - every 8 hours; has noticed less output from the left in the last 2 days    Discussed ways to move the milk from lump, discussed fever being one of the main sx of mastitis used. Discussed ice and ibuprofen for inflammation.  She wants to know from DR Ghotra if can do Ibuprofen w labetalol.  Sent pt mychart recommendations for mastitis prevention/clogged duct care and sx to report.    Routing the above to Dr Ghotra at her request to advise.    Laly Isbell RN on 2/9/2024 at 12:07 PM

## 2024-03-03 ENCOUNTER — MYC MEDICAL ADVICE (OUTPATIENT)
Dept: PEDIATRICS | Facility: CLINIC | Age: 34
End: 2024-03-03
Payer: COMMERCIAL

## 2024-04-06 DIAGNOSIS — F32.A ANXIETY AND DEPRESSION: ICD-10-CM

## 2024-04-06 DIAGNOSIS — F41.9 ANXIETY AND DEPRESSION: ICD-10-CM

## 2024-04-08 RX ORDER — SERTRALINE HYDROCHLORIDE 100 MG/1
100 TABLET, FILM COATED ORAL DAILY
Qty: 90 TABLET | Refills: 1 | Status: SHIPPED | OUTPATIENT
Start: 2024-04-08

## 2024-06-17 ENCOUNTER — MYC MEDICAL ADVICE (OUTPATIENT)
Dept: PEDIATRICS | Facility: CLINIC | Age: 34
End: 2024-06-17
Payer: COMMERCIAL

## 2024-06-17 ENCOUNTER — NURSE TRIAGE (OUTPATIENT)
Dept: PEDIATRICS | Facility: CLINIC | Age: 34
End: 2024-06-17

## 2024-06-17 DIAGNOSIS — U07.1 INFECTION DUE TO 2019 NOVEL CORONAVIRUS: Primary | ICD-10-CM

## 2024-06-17 NOTE — TELEPHONE ENCOUNTER
S-(situation): Symptoms started Saturday.    B-(background):  History of Asthma, baby in October, no longer breast feeding.     A-(assessment): Sore throat is the worst. Also with fever, chills, stuffy nose, cough, HA.      Denies SOB, has had to use inhaler more.     Compared to yesterday she is the same.     Baby in October.     Last booster in February of this year.     IUD      R-(recommendations): Jostin MURPHY COVID TREATMENT VISIT  06/17/24      The patient has been triaged and does not require a higher level of care.    Selena Dutton  33 year old  Current weight? 135.0    Has the patient been seen by a primary care provider at an [facility] or [facility] within the past two years? Yes.   Have you been in close proximity to/do you have a known exposure to a person with a confirmed case of influenza? No.     General treatment eligibility:  Date of positive COVID test (PCR or at home)?  06/17/2024    Are you or have you been hospitalized for this COVID-19 infection? No.   Have you received monoclonal antibodies or antiviral treatment for COVID-19 since this positive test? No.   Do you have any of the following conditions that place you at risk of being very sick from COVID-19?   - Chronic lung diseases such as asthma, bronchiectasis, COPD, interstitial lung disease, pulmonary embolism, pulmonary hypertension   - Mental health disorders including mood disorders, depression, schizophrenia spectrum disorders   Yes, patient has at least one high risk condition as noted above.     Current COVID symptoms:   - fever or chills  - cough  - shortness of breath or difficulty breathing  - fatigue  - muscle or body aches  - headache  - sore throat  - congestion or runny nose  - nausea or vomiting  Yes. Patient has at least one symptom as selected.     How many days since symptoms started? 5 days or less. Established patient, 12 years or older weighing at least 88.2 lbs, who has symptoms  that started in the past 5 days, has not been hospitalized nor received treatment already, and is at risk for being very sick from COVID-19.     Treatment eligibility by RN:  Are you currently pregnant or nursing? No  Do you have a clinically significant hypersensitivity to nirmatrelvir or ritonavir, or toxic epidermal necrolysis (TEN) or Burris-Josué Syndrome? No  Do you have a history of hepatitis, any hepatic impairment on the Problem List (such as Child-Porter Class C, cirrhosis, fatty liver disease, alcoholic liver disease), or was the last liver lab (hepatic panel, ALT, AST, ALK Phos, bilirubin) elevated in the past 6 months? No  Do you have any history of severe renal impairment (eGFR < 30mL/min)? No    Is patient eligible to continue? Yes, patient meets all eligibility requirements for the RN COVID treatment (as denoted by all no responses above).     Current Outpatient Medications   Medication Sig Dispense Refill    acetaminophen (TYLENOL) 325 MG tablet Take 2 tablets (650 mg) by mouth every 6 hours as needed for mild pain Start after Delivery. 100 tablet 0    albuterol (PROAIR HFA/PROVENTIL HFA/VENTOLIN HFA) 108 (90 Base) MCG/ACT inhaler Inhale 2 puffs into the lungs every 6 hours 18 g 3    EPINEPHrine (ANY BX GENERIC EQUIV) 0.3 MG/0.3ML injection 2-pack Inject 0.3 mLs (0.3 mg) into the muscle as needed for anaphylaxis May repeat one time in 5-15 minutes if response to initial dose is inadequate. 2 each 3    fluticasone (FLOVENT HFA) 110 MCG/ACT inhaler Inhale 1 puff into the lungs 2 times daily 12 g 0    labetalol (NORMODYNE) 200 MG tablet Take 1 tablet (200 mg) by mouth 2 times daily 720 tablet 0    levonorgestrel (MIRENA) 52 MG (20 mcg/day) IUD 1 each by Intrauterine route once Recommended for removal/reinsertion 12/1/2031      montelukast (SINGULAIR) 10 MG tablet TAKE 1 TABLET(10 MG) BY MOUTH AT BEDTIME 90 tablet 1    sertraline (ZOLOFT) 100 MG tablet TAKE 1 TABLET(100 MG) BY MOUTH DAILY 90 tablet 1        Medications from List 1 of the standing order (on medications that exclude the use of Paxlovid) that patient is taking: NONE. Is patient taking [place]'s Wort? No  Is patient taking Clif's Wort or any meds from List 1? No.   Medications from List 2 of the standing order (on meds that provider needs to adjust) that patient is taking: NONE. Is patient on any of the meds from List 2? No.   Medications from List 3 of standing order (on meds that a RN needs to adjust) that patient is taking: NONE. Is patient on any meds from List 3? No.     Paxlovid has an approximate 90% reduction in hospitalization. Paxlovid can possibly cause altered sense of taste, diarrhea (loose, watery stools), high blood pressure, muscle aches.     Would patient like a Paxlovid prescription?   Yes.   Lab Results   Component Value Date    GFRESTIMATED >90 10/07/2023       Was last eGFR reduced? No, eGFR 60 or greater/ No Result on record. Patient can receive the normal renal function dose. Paxlovid Rx sent to [facility]   [facility] [address] [facility]    Temporary change to home medications: None    All medication adjustments (holds, etc) were discussed with the patient and patient was asked to repeat back (teachback) their med adjustment.  Did patient understand med adjustment? No medication adjustments needed.         Reviewed the following instructions with the patient:    Paxlovid (nimatrelvir and ritonavir)    How it works  Two medicines (nirmatrelvir and ritonavir) are taken together. They stop the virus from growing. Less amount of virus is easier for your body to fight.    How to take  Medicine comes in a daily container with both medicine tablets. Take by mouth twice daily (once in the morning, once at night) for 5 days.  The number of tablets to take varies by patient.  Don't chew or break capsules. Swallow whole.    When to take  Take as soon as possible after positive COVID-19 test result, and within 5 days of your  first symptoms.    Possible side effects  Can cause altered sense of taste, diarrhea (loose, watery stools), high blood pressure, muscle aches.    Brenda Boucher RN         Reason for Disposition   COVID-19 diagnosed by positive lab test (e.g., PCR, rapid self-test kit) and mild symptoms (e.g., cough, fever, others) and no complications or SOB    Additional Information   Negative: SEVERE difficulty breathing (e.g., struggling for each breath, speaks in single words)   Negative: Difficult to awaken or acting confused (e.g., disoriented, slurred speech)   Negative: Bluish (or gray) lips or face now   Negative: Shock suspected (e.g., cold/pale/clammy skin, too weak to stand, low BP, rapid pulse)   Negative: Sounds like a life-threatening emergency to the triager   Negative: Diagnosed or suspected COVID-19 and symptoms lasting 3 or more weeks   Negative: COVID-19 exposure and no symptoms   Negative: COVID-19 vaccine reaction suspected (e.g., fever, headache, muscle aches) occurring 1 to 3 days after getting vaccine   Negative: COVID-19 vaccine, questions about   Negative: Lives with someone known to have influenza (flu test positive) and flu-like symptoms (e.g., cough, runny nose, sore throat, SOB; with or without fever)   Negative: Possible COVID-19 symptoms and triager concerned about severity of symptoms or other causes   Negative: COVID-19 and breastfeeding, questions about   Negative: SEVERE or constant chest pain or pressure  (Exception: Mild central chest pain, present only when coughing.)   Negative: MODERATE difficulty breathing (e.g., speaks in phrases, SOB even at rest, pulse 100-120)   Negative: Headache and stiff neck (can't touch chin to chest)   Negative: Oxygen level (e.g., pulse oximetry) 90% or lower   Negative: Chest pain or pressure  (Exception: MILD central chest pain, present only when coughing.)   Negative: Drinking very little and dehydration suspected (e.g., no urine > 12 hours, very dry  mouth, very lightheaded)   Negative: Patient sounds very sick or weak to the triager   Negative: MILD difficulty breathing (e.g., minimal/no SOB at rest, SOB with walking, pulse <100)   Negative: Fever > 103 F (39.4 C)   Negative: Fever > 101 F (38.3 C) and over 60 years of age   Negative: Fever > 100.0 F (37.8 C) and bedridden (e.g., CVA, chronic illness, recovering from surgery)   Negative: HIGH RISK patient (e.g., weak immune system, age > 64 years, obesity with BMI of 30 or higher, pregnant, chronic lung disease or other chronic medical condition) and COVID symptoms (e.g., cough, fever)  (Exceptions: Already seen by doctor or NP/PA and no new or worsening symptoms.)   Negative: HIGH RISK patient and influenza is widespread in the community and ONE OR MORE respiratory symptoms: cough, sore throat, runny or stuffy nose   Negative: HIGH RISK patient and influenza exposure within the last 7 days and ONE OR MORE respiratory symptoms: cough, sore throat, runny or stuffy nose   Negative: Oxygen level (e.g., pulse oximetry) 91 to 94%   Negative: COVID-19 infection suspected by caller or triager and mild symptoms (cough, fever, or others) and negative COVID-19 rapid test   Negative: Fever present > 3 days (72 hours)   Negative: Fever returns after gone for over 24 hours and symptoms worse or not improved   Negative: Continuous (nonstop) coughing interferes with work or school and no improvement using cough treatment per Care Advice   Negative: Cough present > 3 weeks   Negative: COVID-19 diagnosed by positive lab test (e.g., PCR, rapid self-test kit) and NO symptoms (e.g., cough, fever, others)    Protocols used: Coronavirus (COVID-19) Diagnosed or [id number]-O-FA

## 2024-08-09 DIAGNOSIS — J45.40 MODERATE PERSISTENT ASTHMA WITHOUT COMPLICATION: ICD-10-CM

## 2024-08-12 RX ORDER — MONTELUKAST SODIUM 10 MG/1
1 TABLET ORAL AT BEDTIME
Qty: 90 TABLET | Refills: 1 | Status: SHIPPED | OUTPATIENT
Start: 2024-08-12

## 2024-09-08 ENCOUNTER — OFFICE VISIT (OUTPATIENT)
Dept: URGENT CARE | Facility: URGENT CARE | Age: 34
End: 2024-09-08
Payer: COMMERCIAL

## 2024-09-08 VITALS
DIASTOLIC BLOOD PRESSURE: 76 MMHG | SYSTOLIC BLOOD PRESSURE: 114 MMHG | TEMPERATURE: 97.3 F | HEART RATE: 69 BPM | RESPIRATION RATE: 16 BRPM | WEIGHT: 143.2 LBS | OXYGEN SATURATION: 96 % | BODY MASS INDEX: 23.11 KG/M2

## 2024-09-08 DIAGNOSIS — H10.9 BACTERIAL CONJUNCTIVITIS OF RIGHT EYE: Primary | ICD-10-CM

## 2024-09-08 PROCEDURE — 99213 OFFICE O/P EST LOW 20 MIN: CPT | Performed by: NURSE PRACTITIONER

## 2024-09-08 RX ORDER — POLYMYXIN B SULFATE AND TRIMETHOPRIM 1; 10000 MG/ML; [USP'U]/ML
1-2 SOLUTION OPHTHALMIC EVERY 4 HOURS
Qty: 10 ML | Refills: 0 | Status: SHIPPED | OUTPATIENT
Start: 2024-09-08 | End: 2024-09-15

## 2024-09-08 NOTE — PROGRESS NOTES
Assessment & Plan     Bacterial conjunctivitis of right eye    - polymixin b-trimethoprim (POLYTRIM) 86458-4.1 UNIT/ML-% ophthalmic solution  Dispense: 10 mL; Refill: 0       Patient Instructions   Polytrim every 4 hours during the day.    Lots of handwashing.    Follow up if persists or worsens.          No follow-ups on file.    KURT Simental CNP  [facility] STEVE Walters is a 33 year old female who presents to clinic today for the following health issues:  Chief Complaint   Patient presents with    Urgent Care     Pt present with swollen/red R eye, pt thinks it might be pink eye, noticed last night.        HPI      Eye Problem    Onset of symptoms was 1 day(s) ago.   Location: right eye   Course of illness is worsening.    Severity moderate  Current and Associated symptoms: mattering, burning, redness, edema  Treatment measures tried include warm packs  Context: Pink eye exposure      Review of Systems  Constitutional, HEENT, cardiovascular, pulmonary, GI, , musculoskeletal, neuro, skin, endocrine and psych systems are negative, except as otherwise noted.      Objective    /76   Pulse 69   Temp 97.3  F (36.3  C) (Tympanic)   Resp 16   Wt 65 kg (143 lb 3.2 oz)   SpO2 96%   BMI 23.11 kg/m    Physical Exam   GENERAL: alert and no distress  EYES: conjunctiva/corneas- conjunctival injection right and yellow colored discharge present right  RESP: lungs clear to auscultation - no rales, rhonchi or wheezes  CV: regular rate and rhythm, normal S1 S2, no S3 or S4, no murmur, click or rub, no peripheral edema  MS: no gross musculoskeletal defects noted, no edema

## 2024-09-25 RX ORDER — ALBUTEROL SULFATE 90 UG/1
2 AEROSOL, METERED RESPIRATORY (INHALATION) EVERY 6 HOURS
Qty: 18 G | Refills: 3 | OUTPATIENT
Start: 2024-09-25

## 2024-09-25 NOTE — TELEPHONE ENCOUNTER
Requested Prescriptions   Pending Prescriptions Disp Refills    albuterol (PROAIR HFA/PROVENTIL HFA/VENTOLIN HFA) 108 (90 Base) MCG/ACT inhaler 18 g 3     Sig: Inhale 2 puffs into the lungs every 6 hours.       Short-Acting Beta Agonist Inhalers Protocol  Passed - 9/25/2024 12:36 PM        Passed - Patient is age 12 or older        Passed - Recent (12 mo) or future (30 days) visit within the authorizing provider's specialty     The patient must have completed an in-person or virtual visit within the past 12 months or has a future visit scheduled within the next 90 days with the authorizing provider s specialty.  Urgent care and e-visits do not quality as an office visit for this protocol.          Passed - Medication is active on med list               Last Written Prescription Date:  09/19/2023  Last Fill Quantity: 18 g,  # refills: 3   Last office visit: Visit date not found ; last virtual visit: Visit date not found with prescribing provider:  Hazel Ghotra MD    Future Office Visit:

## 2024-10-06 ENCOUNTER — NURSE TRIAGE (OUTPATIENT)
Dept: NURSING | Facility: CLINIC | Age: 34
End: 2024-10-06

## 2024-10-06 ENCOUNTER — TELEPHONE (OUTPATIENT)
Dept: PEDIATRICS | Facility: CLINIC | Age: 34
End: 2024-10-06

## 2024-10-06 ENCOUNTER — E-VISIT (OUTPATIENT)
Dept: OBGYN | Facility: CLINIC | Age: 34
End: 2024-10-06
Payer: COMMERCIAL

## 2024-10-06 DIAGNOSIS — R69 DIAGNOSIS UNKNOWN: ICD-10-CM

## 2024-10-06 DIAGNOSIS — Z98.891 S/P CESAREAN SECTION: ICD-10-CM

## 2024-10-06 DIAGNOSIS — O16.3 SEVERE HYPERTENSION AFFECTING PREGNANCY IN THIRD TRIMESTER: ICD-10-CM

## 2024-10-06 DIAGNOSIS — I10 HYPERTENSION, ESSENTIAL: Primary | ICD-10-CM

## 2024-10-06 PROCEDURE — 99207 PR NO BILLABLE SERVICE THIS VISIT: CPT | Performed by: OBSTETRICS & GYNECOLOGY

## 2024-10-06 RX ORDER — LABETALOL 200 MG/1
200 TABLET, FILM COATED ORAL 2 TIMES DAILY
Qty: 60 TABLET | Refills: 0 | Status: SHIPPED | OUTPATIENT
Start: 2024-10-06 | End: 2024-10-28

## 2024-10-06 NOTE — TELEPHONE ENCOUNTER
Medication Question or Refill    What medication are you calling about (include dose and sig)?: Labetalol 200 MG tablet    Preferred Pharmacy:  [id number] - [place], [place] - [address] [place] [address]  Phone: [phone] Fax: [phone]        Controlled Substance Agreement on file:   CSA -- Patient Level:    CSA: None found at the patient level.       Who prescribed the medication?:     Do you need a refill? Yes    Do you have any questions or concerns?  No      Could we send this information to you in [place] or would you prefer to receive a phone call?:   Patient would prefer a phone call   Okay to leave a detailed message?: Yes at Cell number on file:    Telephone Information:   Mobile [phone]

## 2024-10-06 NOTE — TELEPHONE ENCOUNTER
Patient needs labetalol refill as she is going out of town and needs a fill before then.     Prescribing doctor was Dr Hazel Ghotra, her OB.     Paged to on-call OB.     Dr Radha Harley will prescribe a 1 month supply but advises she needs a follow-up appt for any more.     This RN send in a 1-month refill to patient preferred pharmacy. Relayed recommendation for a follow-up appt. Patient is agreeable with plan and verbalizes understanding.     Christopher Sifuentes RN on 10/6/2024 at 10:04 AM    Reason for Disposition   [1] Prescription refill request for ESSENTIAL medicine (i.e., likelihood of harm to patient if not taken) AND [2] triager unable to refill per department policy    Protocols used: Medication Refill and Renewal Call-A-

## 2024-10-07 NOTE — PATIENT INSTRUCTIONS
Dear Selena,    We are sorry you are not feeling well. Based on the responses you provided, it is recommended that you be seen in-person in clinic so we can better evaluate your symptoms. Please schedule this visit in [email or web address]. You will have a Schedule Now button in [email or web address] to help with scheduling this appointment. Otherwise, you can call [phone] to schedule an appointment.     You will not be charged for this eVisit. Thank you for trusting us with your care.     Hazel Ghotra MD    Thank you for choosing us for your care. I think an in-clinic or virtual visit would be the best next step based on your symptoms. Please schedule a clinic appointment; you won t be charged for this eVisit.      You can schedule an appointment by clicking here in [email or web address], or call [phone].

## 2024-10-10 DIAGNOSIS — F32.A ANXIETY AND DEPRESSION: ICD-10-CM

## 2024-10-10 DIAGNOSIS — F41.9 ANXIETY AND DEPRESSION: ICD-10-CM

## 2024-10-10 RX ORDER — SERTRALINE HYDROCHLORIDE 100 MG/1
100 TABLET, FILM COATED ORAL DAILY
Qty: 90 TABLET | Refills: 1 | Status: SHIPPED | OUTPATIENT
Start: 2024-10-10

## 2024-10-28 ENCOUNTER — MYC REFILL (OUTPATIENT)
Dept: PEDIATRICS | Facility: CLINIC | Age: 34
End: 2024-10-28
Payer: COMMERCIAL

## 2024-10-28 DIAGNOSIS — Z98.891 S/P CESAREAN SECTION: ICD-10-CM

## 2024-10-28 DIAGNOSIS — O16.3 SEVERE HYPERTENSION AFFECTING PREGNANCY IN THIRD TRIMESTER: ICD-10-CM

## 2024-10-28 RX ORDER — LABETALOL 200 MG/1
200 TABLET, FILM COATED ORAL 2 TIMES DAILY
Qty: 60 TABLET | Refills: 0 | Status: SHIPPED | OUTPATIENT
Start: 2024-10-28 | End: 2024-11-07

## 2024-10-28 RX ORDER — LABETALOL 200 MG/1
200 TABLET, FILM COATED ORAL 2 TIMES DAILY
Qty: 60 TABLET | Refills: 0 | OUTPATIENT
Start: 2024-10-28

## 2024-10-28 NOTE — TELEPHONE ENCOUNTER
Requested Prescriptions   Pending Prescriptions Disp Refills    labetalol (NORMODYNE) 200 MG tablet [Pharmacy Med Name: LABETALOL 200MG TABLETS] 60 tablet 0     Sig: TAKE 1 TABLET(200 MG) BY MOUTH TWICE DAILY       Beta-Blockers Protocol Passed - 10/28/2024 11:00 AM        Passed - Most recent blood pressure under 140/90 in past 12 months     BP Readings from Last 3 Encounters:   09/08/24 114/76   02/02/24 120/60   12/01/23 118/79       No data recorded            Passed - Patient is age 6 or older        Passed - Medication is active on med list        Passed - Medication indicated for associated diagnosis     Medication is associated with one or more of the following diagnoses:     Hypertension (HTN)   Atrial fibrillation/flutter   Angina   ASCVD   Migraine   Heart Failure   Tremor   Anxiety   Ocular hypertension   Glaucoma   PTSD   Obstructive hypertrophic cardiomyopathy   History of myocarditis   Palpitations   POTS (postural orthostatic tachycardia syndrome)   SVT (supraventricular tachycardia)   Hyperthyroidism   Tachycardia          Passed - Recent (12 mo) or future (90 days) visit within the authorizing provider's specialty     The patient must have completed an in-person or virtual visit within the past 12 months or has a future visit scheduled within the next 90 days with the authorizing provider s specialty.  Urgent care and e-visits do not quality as an office visit for this protocol.             Last Written Prescription Date:  10/6/24  Last Fill Quantity: 60,  # refills: 0   Last office visit: Visit date not found ; last virtual visit: Visit date not found with prescribing provider:  Cricket     Future Office Visit:   Next 5 appointments (look out 90 days)      Nov 07, 2024 9:00 AM  (Arrive by 8:55 AM)  Provider Visit with KURT Tomas CNP  [facility] [place] ([facility] [place] ) [address] [place] [address]  [address]  [place] [address] [phone]     Nov 26,  2024 8:00 AM  IUD INSERTION AND REMOVAL with Hazel Ghotra MD  [facility] ([facility] ) [address] [place] [id number] [phone]           Nurse note from 10/6/24:    Patient needs labetalol refill as she is going out of town and needs a fill before then.      Prescribing doctor was Dr Hazel Ghotra, her OB.      Paged to on-call OB.      Dr Radha Harley will prescribe a 1 month supply but advises she needs a follow-up appt for any more.      This RN send in a 1-month refill to patient preferred pharmacy. Relayed recommendation for a follow-up appt. Patient is agreeable with plan and verbalizes understanding.           Rx denied. Pt over 1 year PP, Pt should follow up with PCP for further refills    Idalmis Barfield, RN on 10/28/2024 at 11:10 AM  [facility] Triage

## 2024-10-28 NOTE — TELEPHONE ENCOUNTER
Requested Prescriptions   Pending Prescriptions Disp Refills    labetalol (NORMODYNE) 200 MG tablet 60 tablet 0     Sig: Take 1 tablet (200 mg) by mouth 2 times daily.       Beta-Blockers Protocol Passed - 10/28/2024 11:48 AM        Passed - Most recent blood pressure under 140/90 in past 12 months     BP Readings from Last 3 Encounters:   09/08/24 114/76   02/02/24 120/60   12/01/23 118/79       No data recorded            Passed - Patient is age 6 or older        Passed - Medication is active on med list        Passed - Medication indicated for associated diagnosis     Medication is associated with one or more of the following diagnoses:     Hypertension (HTN)   Atrial fibrillation/flutter   Angina   ASCVD   Migraine   Heart Failure   Tremor   Anxiety   Ocular hypertension   Glaucoma   PTSD   Obstructive hypertrophic cardiomyopathy   History of myocarditis   Palpitations   POTS (postural orthostatic tachycardia syndrome)   SVT (supraventricular tachycardia)   Hyperthyroidism   Tachycardia   Acute non-st segment elevation myocardial infarction   Subsequent non-st segment elevation myocardial infarction   Ischemic myocardial dysfunction          Passed - Recent (12 mo) or future (90 days) visit within the authorizing provider's specialty     The patient must have completed an in-person or virtual visit within the past 12 months or has a future visit scheduled within the next 90 days with the authorizing provider s specialty.  Urgent care and e-visits do not quality as an office visit for this protocol.             Last Written Prescription Date:  10/6/24  Last Fill Quantity: 60,  # refills: 0   Last office visit: 2/2/2024 ; last virtual visit: Visit date not found with prescribing provider:  BETTYE Meyer CNM  Future Office Visit:   Next 5 appointments (look out 90 days)      Nov 07, 2024 9:00 AM  (Arrive by 8:55 AM)  Provider Visit with KURT Tomas CNP  [facility] [place] [facility] [place] [facility] [address]  [address]  [place] [address]  [phone]     Nov 26, 2024 8:00 AM  IUD INSERTION AND REMOVAL with MD CORTNEY [facility] [place] ([facility] [place] ) [address]  [address]  [place] [address]  [phone]           Routing to [facility] office - not appropriate for GYN this far out of pregnancy. Sent a 30 day extension last month only from this office.    Laly Isbell RN on 10/28/2024 at 11:51 AM

## 2024-11-02 ASSESSMENT — ASTHMA QUESTIONNAIRES
QUESTION_3 LAST FOUR WEEKS HOW OFTEN DID YOUR ASTHMA SYMPTOMS (WHEEZING, COUGHING, SHORTNESS OF BREATH, CHEST TIGHTNESS OR PAIN) WAKE YOU UP AT NIGHT OR EARLIER THAN USUAL IN THE MORNING: ONCE OR TWICE
QUESTION_2 LAST FOUR WEEKS HOW OFTEN HAVE YOU HAD SHORTNESS OF BREATH: ONCE OR TWICE A WEEK
ACT_TOTALSCORE: 20
QUESTION_5 LAST FOUR WEEKS HOW WOULD YOU RATE YOUR ASTHMA CONTROL: WELL CONTROLLED
QUESTION_4 LAST FOUR WEEKS HOW OFTEN HAVE YOU USED YOUR RESCUE INHALER OR NEBULIZER MEDICATION (SUCH AS ALBUTEROL): TWO OR THREE TIMES PER WEEK
ACT_TOTALSCORE: 20
QUESTION_1 LAST FOUR WEEKS HOW MUCH OF THE TIME DID YOUR ASTHMA KEEP YOU FROM GETTING AS MUCH DONE AT WORK, SCHOOL OR AT HOME: NONE OF THE TIME

## 2024-11-07 ENCOUNTER — VIRTUAL VISIT (OUTPATIENT)
Dept: PEDIATRICS | Facility: CLINIC | Age: 34
End: 2024-11-07
Payer: COMMERCIAL

## 2024-11-07 DIAGNOSIS — O16.3 SEVERE HYPERTENSION AFFECTING PREGNANCY IN THIRD TRIMESTER: Primary | ICD-10-CM

## 2024-11-07 DIAGNOSIS — F41.9 ANXIETY AND DEPRESSION: ICD-10-CM

## 2024-11-07 DIAGNOSIS — F32.A ANXIETY AND DEPRESSION: ICD-10-CM

## 2024-11-07 DIAGNOSIS — Z98.891 S/P CESAREAN SECTION: ICD-10-CM

## 2024-11-07 PROBLEM — O14.90 PRE-ECLAMPSIA: Status: RESOLVED | Noted: 2023-09-30 | Resolved: 2024-11-07

## 2024-11-07 PROBLEM — Z36.89 ENCOUNTER FOR TRIAGE IN PREGNANT PATIENT: Status: RESOLVED | Noted: 2023-09-29 | Resolved: 2024-11-07

## 2024-11-07 PROBLEM — O09.90 SUPERVISION OF HIGH-RISK PREGNANCY: Status: RESOLVED | Noted: 2023-05-04 | Resolved: 2024-11-07

## 2024-11-07 PROCEDURE — 99214 OFFICE O/P EST MOD 30 MIN: CPT | Mod: 95 | Performed by: NURSE PRACTITIONER

## 2024-11-07 PROCEDURE — G2211 COMPLEX E/M VISIT ADD ON: HCPCS | Mod: 95 | Performed by: NURSE PRACTITIONER

## 2024-11-07 RX ORDER — LABETALOL 200 MG/1
200 TABLET, FILM COATED ORAL 2 TIMES DAILY
Qty: 180 TABLET | Refills: 3 | Status: SHIPPED | OUTPATIENT
Start: 2024-11-07

## 2024-11-07 NOTE — PROGRESS NOTES
Selena is a 34 year old who is being evaluated via a billable video visit.          Assessment & Plan     Severe hypertension affecting pregnancy in third trimester  S/P  section  Chronic, stable. She is one year postpartum. Taking labetalol 200 mg BID. Denies symptoms of hypertension. Planning for IUD removal at end of this month at which point she will start trying to conceive. Recommedn regular monitoring of BP once pregnancy is confirmed. Suspect OB/GYN will assume management of her BP once pregnant. Refills provided.   - labetalol (NORMODYNE) 200 MG tablet; Take 1 tablet (200 mg) by mouth 2 times daily.    Anxiety and depression  Chronic, stable. Continue sertraline 100 mg daily. Refills on file, not needed at this time.       The longitudinal plan of care for the diagnosis(es)/condition(s) as documented were addressed during this visit. Due to the added complexity in care, I will continue to support Selena in the subsequent management and with ongoing continuity of care.      Subjective   Selena is a 34 year old, presenting for the following health issues:  Recheck Medication    HPI     Presents today for medication recheck.     #hypertension  One year postpartum. She has a blood pressure cuff at home but admits she's really not monitoring her blood pressure anymore. She hasn't had any symptoms of hypertension. Denies lower extremity swelling, headaches, vision changes. She continues to take labetalol 200 mg BID. She is planning to get her IUD removed at the end of this month and start trying for baby #2.    #anxiety  #depression  Anxiety and depression have been stable. Continues to take sertraline 100 mg daily.       Objective       Vitals:  No vitals were obtained today due to virtual visit.    Physical Exam   GENERAL: alert and no distress  EYES: Eyes grossly normal to inspection.  No discharge or erythema, or obvious scleral/conjunctival abnormalities.  RESP: No audible wheeze, cough, or visible  Steph Ashford's SCREENING SCHEDULE   Tests on this list are recommended by your physician but may not be covered, or covered at this frequency, by your insurer. Please check with your insurance carrier before scheduling to verify coverage.    PRE cyanosis.    SKIN: Visible skin clear. No significant rash, abnormal pigmentation or lesions.  NEURO: Cranial nerves grossly intact.  Mentation and speech appropriate for age.  PSYCH: Appropriate affect, tone, and pace of words          Video-Visit Details    Type of service:  Video Visit   Originating Location (pt. Location): Home    Distant Location (provider location):  Off-site  Platform used for Video Visit: Evan  Signed Electronically by: KURT Tomas CNP     more often if abnormal Colonoscopy,10 Years due on 07/17/1995 Update Health Maintenance if applicable    Flex Sigmoidoscopy Screen  Covered every 5 years No results found for this or any previous visit. No flowsheet data found.      Fecal Occult Blood   Cov 65 No orders found for this or any previous visit. Please get once after your 65th birthday    Hepatitis B for Moderate/High Risk       No orders found for this or any previous visit.  Medium/high risk factors:   End-stage renal disease   Hemophiliacs who r

## 2024-11-26 ENCOUNTER — OFFICE VISIT (OUTPATIENT)
Dept: OBGYN | Facility: CLINIC | Age: 34
End: 2024-11-26
Payer: COMMERCIAL

## 2024-11-26 VITALS
BODY MASS INDEX: 22.22 KG/M2 | WEIGHT: 141.6 LBS | SYSTOLIC BLOOD PRESSURE: 98 MMHG | HEIGHT: 67 IN | DIASTOLIC BLOOD PRESSURE: 60 MMHG

## 2024-11-26 DIAGNOSIS — Z30.432 ENCOUNTER FOR REMOVAL OF INTRAUTERINE CONTRACEPTIVE DEVICE: Primary | ICD-10-CM

## 2024-11-26 PROCEDURE — 58301 REMOVE INTRAUTERINE DEVICE: CPT | Performed by: OBSTETRICS & GYNECOLOGY

## 2024-11-26 NOTE — PROGRESS NOTES
IUD Removal:  SUBJECTIVE:    Is a pregnancy test required: No.  Was a consent obtained?  Yes    Selena Dutton is a 34 year old female,, No LMP recorded. (Menstrual status: IUD). who presents today for IUD removal. Her current IUD was placed  ago. She has had problems including no comfort  with the IUD. She requests removal of the IUD because she desires to conceive    Today's PHQ-2 Score:       2024    12:10 PM   PHQ-2 ( 1999 [employer])   Q1: Little interest or pleasure in doing things 0    Q2: Feeling down, depressed or hopeless 0    PHQ-2 Score 0   Q1: Little interest or pleasure in doing things Not at all   Q2: Feeling down, depressed or hopeless Not at all   PHQ-2 Score 0       Patient-reported       PROCEDURE:    A speculum exam was performed and the cervix was visualized. The IUD string was visualized. Using ring forceps, the string  was grasped and the IUD removed intact.    POST PROCEDURE:    The patient tolerated the procedure well. Patient was discharged in stable condition.    Call if bleeding, pain or fever occur. and Pregnancy counseling given, including folic acid supplementation 800-1000 mg per day.    Hazel Ghotra MD

## 2024-12-09 ENCOUNTER — TELEPHONE (OUTPATIENT)
Dept: PEDIATRICS | Facility: CLINIC | Age: 34
End: 2024-12-09
Payer: COMMERCIAL

## 2024-12-09 NOTE — TELEPHONE ENCOUNTER
I would recommend in person visit if she is not improving or if symptoms worsen in the next 24-48 hours.     Thanks,   Sary Meyer, DNP, APRN, CNP

## 2024-12-09 NOTE — TELEPHONE ENCOUNTER
Called and spoke with patient. States she has had a cold the last two weeks and that is triggering her asthma symptoms, especially coughing. States she is using rescue inhaler almost as a precaution when she wakes up and it is helping her symptoms.    States she is travelling for work this week and could not be seen until Friday if necessary. States she thinks this is more attributed to the cold and wants to wait to reassess until Friday when she is back.     Denies new shortness of breath or chest tightness, or symptoms at this time. Agreeable to call the clinic or present to ED for worsening symptoms.    Sary Meyer please review and advise.    [facility] Day 1-Asthma Control Test (Act) For People 12 Years And Older     Question 12/7/2024  4:49 PM CST - Filed by Patient   1. In the past 4 weeks, how much of the time did your asthma keep you from getting as much done at work, school or at home? A little of the time   2. During the past 4 weeks, how often have you had shortness of breath? Three to six times a week   3. During the past 4 weeks, how often did your asthma symptoms (wheezing, coughing, shortness of breath, chest tightness or pain) wake you up at night or earlier than usual in the morning? Two or three nights a week   4. During the past 4 weeks, how often have you used your rescue inhaler or nebulizer medication (such as albuterol)? One or two times per day   5. How would you rate your asthma control during the past 4 weeks? Poorly controlled   Peq Act Total Score (range: 5 - 25) 13      Sarah Wong RN

## 2024-12-09 NOTE — TELEPHONE ENCOUNTER
"Notified Patient  of provider's message.     Patient reports her asthma symptoms are \"definitely improved\".    Person was given an opportunity to ask questions, verbalized understanding of plan, and is agreeable.     Lynn Moses RN     "

## 2024-12-31 ENCOUNTER — ANCILLARY PROCEDURE (OUTPATIENT)
Dept: GENERAL RADIOLOGY | Facility: CLINIC | Age: 34
End: 2024-12-31
Attending: EMERGENCY MEDICINE
Payer: COMMERCIAL

## 2024-12-31 ENCOUNTER — OFFICE VISIT (OUTPATIENT)
Dept: URGENT CARE | Facility: URGENT CARE | Age: 34
End: 2024-12-31
Payer: COMMERCIAL

## 2024-12-31 VITALS
HEART RATE: 80 BPM | WEIGHT: 141 LBS | OXYGEN SATURATION: 96 % | TEMPERATURE: 98.4 F | RESPIRATION RATE: 16 BRPM | SYSTOLIC BLOOD PRESSURE: 118 MMHG | BODY MASS INDEX: 22.08 KG/M2 | DIASTOLIC BLOOD PRESSURE: 70 MMHG

## 2024-12-31 DIAGNOSIS — J45.41 MODERATE PERSISTENT ASTHMA WITH ACUTE EXACERBATION: ICD-10-CM

## 2024-12-31 DIAGNOSIS — R07.89 CHEST WALL PAIN: ICD-10-CM

## 2024-12-31 DIAGNOSIS — J06.9 UPPER RESPIRATORY TRACT INFECTION, UNSPECIFIED TYPE: ICD-10-CM

## 2024-12-31 DIAGNOSIS — J06.9 UPPER RESPIRATORY TRACT INFECTION, UNSPECIFIED TYPE: Primary | ICD-10-CM

## 2024-12-31 PROCEDURE — 99214 OFFICE O/P EST MOD 30 MIN: CPT | Performed by: EMERGENCY MEDICINE

## 2024-12-31 PROCEDURE — 71046 X-RAY EXAM CHEST 2 VIEWS: CPT | Mod: TC | Performed by: RADIOLOGY

## 2024-12-31 RX ORDER — BENZONATATE 100 MG/1
100 CAPSULE ORAL 3 TIMES DAILY PRN
Qty: 21 CAPSULE | Refills: 0 | Status: SHIPPED | OUTPATIENT
Start: 2024-12-31

## 2024-12-31 RX ORDER — PREDNISONE 20 MG/1
40 TABLET ORAL DAILY
Qty: 10 TABLET | Refills: 0 | Status: SHIPPED | OUTPATIENT
Start: 2024-12-31 | End: 2025-01-05

## 2024-12-31 NOTE — PROGRESS NOTES
Assessment & Plan     Diagnosis:    ICD-10-CM    1. Upper respiratory tract infection, unspecified type  J06.9 XR Chest 2 Views     benzonatate (TESSALON) 100 MG capsule      2. Chest wall pain  R07.89 XR Chest 2 Views      3. Moderate persistent asthma with acute exacerbation  J45.41 predniSONE (DELTASONE) 20 MG tablet          Medical Decision Making:  Selena Dutton is a 34 year old female who presents for evaluation of cough, left chest wall pain after a coughing fit, wheezing, congestion.  This is consistent with an upper respiratory tract infection and asthma exacerbation.  There is no signs at this point of serious bacterial infection such as OM, RPA, epiglottitis, PTA, strep pharyngitis, pneumonia, meningitis, bacteremia, serious bacterial infection.      Given chest wall pain , I did a CXR to eval for pneumonia, rib fx/pneumothorax. This shows no acute infiltrate, effusion or pneumothorax. There are no signs of complications of URI/asthma exacerbation at this point such as  hypoxia, respiratory failure or compromise.       There are no gastrointestinal symptoms at this point and no signs of dehydration.  Close followup with PCP is indicated.  Go to ED for fever > 102 F,  worsening shortness of breath, chest pain or other concerns.  Patient verbalized understanding and agreement with the plan was discharged in stable condition.      Miguel Jacobo PA-C  [facility]    Subjective     Selena Dutton is a 34 year old female who presents to clinic today for the following health issues:  Chief Complaint   Patient presents with    Cough     PT has been coughing a lot and coughed hard enough to where she is now having L sided rib and back pain       HPI  Patient with cold symptoms intermittently over the last couple of weeks, worse in the last 3 days, wheezing, coughing so much to the point where she started to have left-sided rib pain and back pain, she does note that this happened after a  particularly hard coughing fit. Hurts more to twist/bend or breathe in.  Does have a history of asthma and has been wheezing more lately than usual, having to use her inhaler more.  No recent steroid course or hospitalization for.  She denies any fevers, exertional chest pain, leg swelling, nausea, vomiting, diarrhea or other concerns.    Review of Systems    See HPI    Objective      Vitals: /70   Pulse 80   Temp 98.4  F (36.9  C) (Tympanic)   Resp 16   Wt 64 kg (141 lb)   SpO2 96%   BMI 22.08 kg/m        Patient Vitals for the past 24 hrs:   BP Temp Temp src Pulse Resp SpO2 Weight   12/31/24 1107 118/70 98.4  F (36.9  C) Tympanic 80 16 96 % 64 kg (141 lb)       Vital signs reviewed by: Miguel Jacobo PA-C    Physical Exam   Constitutional: Patient is alert and cooperative. No acute distress.  Cardiovascular: Regular rate and rhythm  Pulmonary/Chest: Expiratory wheezes in the upper lung fields bilaterally.  No rales or rhonchi. No retractions. Speaking in full sentences, no respiratory distress.  Left chest wall tenderness to palpation.  No crepitus.  Neurological: Alert and oriented x3.   Skin: No rash noted on visualized skin.  Psychiatric:The patient has a normal mood and affect.     Labs/Imaging:  Results for orders placed or performed in visit on 12/31/24   XR Chest 2 Views     Status: None    Narrative    CHEST TWO VIEWS  12/31/2024 11:50 AM     HISTORY:  Chest wall pain. Upper respiratory tract infection.    COMPARISON: None.      Impression    IMPRESSION: Negative chest. Lungs clear.    GARY MIKE MD         SYSTEM ID:  [id number]     Reading per radiology    Miguel Jacobo PA-C, December 31, 2024

## 2025-02-23 DIAGNOSIS — J45.40 MODERATE PERSISTENT ASTHMA WITHOUT COMPLICATION: ICD-10-CM

## 2025-02-24 RX ORDER — MONTELUKAST SODIUM 10 MG/1
TABLET ORAL
Qty: 90 TABLET | Refills: 1 | Status: SHIPPED | OUTPATIENT
Start: 2025-02-24

## 2025-03-16 ENCOUNTER — HEALTH MAINTENANCE LETTER (OUTPATIENT)
Age: 35
End: 2025-03-16

## 2025-04-05 DIAGNOSIS — F32.A ANXIETY AND DEPRESSION: ICD-10-CM

## 2025-04-05 DIAGNOSIS — F41.9 ANXIETY AND DEPRESSION: ICD-10-CM

## 2025-04-07 RX ORDER — SERTRALINE HYDROCHLORIDE 100 MG/1
100 TABLET, FILM COATED ORAL DAILY
Qty: 90 TABLET | Refills: 0 | Status: SHIPPED | OUTPATIENT
Start: 2025-04-07

## 2025-04-23 SDOH — HEALTH STABILITY: PHYSICAL HEALTH: ON AVERAGE, HOW MANY DAYS PER WEEK DO YOU ENGAGE IN MODERATE TO STRENUOUS EXERCISE (LIKE A BRISK WALK)?: 3 DAYS

## 2025-04-23 SDOH — HEALTH STABILITY: PHYSICAL HEALTH: ON AVERAGE, HOW MANY MINUTES DO YOU ENGAGE IN EXERCISE AT THIS LEVEL?: 20 MIN

## 2025-04-23 ASSESSMENT — ASTHMA QUESTIONNAIRES
QUESTION_3 LAST FOUR WEEKS HOW OFTEN DID YOUR ASTHMA SYMPTOMS (WHEEZING, COUGHING, SHORTNESS OF BREATH, CHEST TIGHTNESS OR PAIN) WAKE YOU UP AT NIGHT OR EARLIER THAN USUAL IN THE MORNING: NOT AT ALL
QUESTION_1 LAST FOUR WEEKS HOW MUCH OF THE TIME DID YOUR ASTHMA KEEP YOU FROM GETTING AS MUCH DONE AT WORK, SCHOOL OR AT HOME: NONE OF THE TIME
ACT_TOTALSCORE: 21
QUESTION_4 LAST FOUR WEEKS HOW OFTEN HAVE YOU USED YOUR RESCUE INHALER OR NEBULIZER MEDICATION (SUCH AS ALBUTEROL): TWO OR THREE TIMES PER WEEK
QUESTION_5 LAST FOUR WEEKS HOW WOULD YOU RATE YOUR ASTHMA CONTROL: WELL CONTROLLED
QUESTION_2 LAST FOUR WEEKS HOW OFTEN HAVE YOU HAD SHORTNESS OF BREATH: ONCE OR TWICE A WEEK

## 2025-04-23 ASSESSMENT — SOCIAL DETERMINANTS OF HEALTH (SDOH): HOW OFTEN DO YOU GET TOGETHER WITH FRIENDS OR RELATIVES?: TWICE A WEEK

## 2025-04-28 ENCOUNTER — OFFICE VISIT (OUTPATIENT)
Dept: PEDIATRICS | Facility: CLINIC | Age: 35
End: 2025-04-28
Payer: COMMERCIAL

## 2025-04-28 VITALS
HEIGHT: 66 IN | OXYGEN SATURATION: 97 % | TEMPERATURE: 97.8 F | HEART RATE: 71 BPM | WEIGHT: 144 LBS | DIASTOLIC BLOOD PRESSURE: 84 MMHG | BODY MASS INDEX: 23.14 KG/M2 | SYSTOLIC BLOOD PRESSURE: 131 MMHG | RESPIRATION RATE: 16 BRPM

## 2025-04-28 DIAGNOSIS — Z00.00 ENCOUNTER FOR PREVENTATIVE ADULT HEALTH CARE EXAMINATION: Primary | ICD-10-CM

## 2025-04-28 DIAGNOSIS — O14.13 SEVERE PRE-ECLAMPSIA IN THIRD TRIMESTER: ICD-10-CM

## 2025-04-28 DIAGNOSIS — O13.3 GESTATIONAL HYPERTENSION, THIRD TRIMESTER: ICD-10-CM

## 2025-04-28 DIAGNOSIS — J45.20 MILD INTERMITTENT ASTHMA WITHOUT COMPLICATION: ICD-10-CM

## 2025-04-28 DIAGNOSIS — F41.9 ANXIETY AND DEPRESSION: ICD-10-CM

## 2025-04-28 DIAGNOSIS — F32.A ANXIETY AND DEPRESSION: ICD-10-CM

## 2025-04-28 DIAGNOSIS — Z91.010 PEANUT ALLERGY: ICD-10-CM

## 2025-04-28 PROBLEM — O16.3 SEVERE HYPERTENSION AFFECTING PREGNANCY IN THIRD TRIMESTER: Status: RESOLVED | Noted: 2023-09-29 | Resolved: 2025-04-28

## 2025-04-28 PROBLEM — O14.10 PREECLAMPSIA, SEVERE: Status: RESOLVED | Noted: 2023-10-07 | Resolved: 2025-04-28

## 2025-04-28 PROCEDURE — 3079F DIAST BP 80-89 MM HG: CPT | Performed by: NURSE PRACTITIONER

## 2025-04-28 PROCEDURE — 1126F AMNT PAIN NOTED NONE PRSNT: CPT | Performed by: NURSE PRACTITIONER

## 2025-04-28 PROCEDURE — 99213 OFFICE O/P EST LOW 20 MIN: CPT | Mod: 25 | Performed by: NURSE PRACTITIONER

## 2025-04-28 PROCEDURE — G2211 COMPLEX E/M VISIT ADD ON: HCPCS | Performed by: NURSE PRACTITIONER

## 2025-04-28 PROCEDURE — 99395 PREV VISIT EST AGE 18-39: CPT | Performed by: NURSE PRACTITIONER

## 2025-04-28 PROCEDURE — 3075F SYST BP GE 130 - 139MM HG: CPT | Performed by: NURSE PRACTITIONER

## 2025-04-28 RX ORDER — SERTRALINE HYDROCHLORIDE 100 MG/1
100 TABLET, FILM COATED ORAL DAILY
Qty: 90 TABLET | Refills: 3 | Status: SHIPPED | OUTPATIENT
Start: 2025-04-28

## 2025-04-28 ASSESSMENT — PAIN SCALES - GENERAL: PAINLEVEL_OUTOF10: NO PAIN (0)

## 2025-04-28 NOTE — PROGRESS NOTES
Preventive Care Visit  [facility] KURT Sanchez CNP, Family Medicine  Apr 28, 2025      Assessment & Plan     Encounter for preventative adult health care examination  Age appropriate screening and preventative care have been addressed today. Vaccinations have been reviewed. Patient has been advised to undertake routine aerobic activity. Recommend annual vision exams as well as biannual dental exams. They will follow up for annual physical again in one year.   - Pap up to date    Anxiety and depression  Chronic, stable.   - sertraline (ZOLOFT) 100 MG tablet; Take 1 tablet (100 mg) by mouth daily.    Mild intermittent asthma without complication  Chronic, stable. Well controlled. No concerns. Uses albuterol as needed, usually with URIs. Not currently using flovent at all. No refills needed at this time.     Severe pre-eclampsia in third trimester  Gestational hypertension, third trimester  Recently pregnant, estimated to be 5 weeks. Continues on labetalol 100 mg BID. No concerns. Managed by OB.    Peanut allergy  Does not need Epi pen refill at this time.         Counseling  Appropriate preventive services were addressed with this patient via screening, questionnaire, or discussion as appropriate for fall prevention, nutrition, physical activity, Tobacco-use cessation, social engagement, weight loss and cognition.  Checklist reviewing preventive services available has been given to the patient.  Reviewed patient's diet, addressing concerns and/or questions.   She is at risk for lack of exercise and has been provided with information to increase physical activity for the benefit of her well-being.   The patient was instructed to see the dentist every 6 months.       The longitudinal plan of care for the diagnosis(es)/condition(s) as documented were addressed during this visit. Due to the added complexity in care, I will continue to support Selena in the subsequent management and with ongoing  continuity of care.    Subjective   Selena is a 34 year old, presenting for the following:  Physical        4/28/2025     8:56 AM   Additional Questions   Roomed by [facility]   Accompanied by self          HPI    #anxiety  Doing well. Taking sertraline 100 mg daily as prescribed.     #history of severe pre-eclampsia  Recently pregnant, estimated to be 5 weeks. Continues on labetalol 100 mg BID. No concerns. Managed by OB.     BP Readings from Last 3 Encounters:   04/28/25 131/84   12/31/24 118/70   11/26/24 98/60     #asthma  #allergies  Mild, intermittent. Well controlled. No concerns. Uses albuterol as needed, usually with URIs. Not currently using flovent at all. Also taking montelukast, history of seasonal allergies.       Advance Care Planning    Discussed advance care planning with patient; informed AVS has link to [employer].        4/23/2025   General Health   How would you rate your overall physical health? Good   Feel stress (tense, anxious, or unable to sleep) Only a little   (!) STRESS CONCERN      4/23/2025   Nutrition   Three or more servings of calcium each day? (!) I DON'T KNOW   Diet: Regular (no restrictions)   How many servings of fruit and vegetables per day? (!) 0-1   How many sweetened beverages each day? 0-1         4/23/2025   Exercise   Days per week of moderate/strenous exercise 3 days   Average minutes spent exercising at this level 20 min         4/23/2025   Social Factors   Frequency of gathering with friends or relatives Twice a week   Worry food won't last until get money to buy more No   Food not last or not have enough money for food? No   Do you have housing? (Housing is defined as stable permanent housing and does not include staying outside in a car, in a tent, in an abandoned building, in an overnight shelter, or couch-surfing.) Yes   Are you worried about losing your housing? No   Lack of transportation? No   Unable to get utilities (heat,electricity)? No         4/23/2025    Dental   Dentist two times every year? (!) NO         Today's PHQ-2 Score:       2025     8:43 AM   PHQ-2 (  [employer])   Q1: Little interest or pleasure in doing things 0   Q2: Feeling down, depressed or hopeless 0   PHQ-2 Score 0    Q1: Little interest or pleasure in doing things Not at all   Q2: Feeling down, depressed or hopeless Not at all   PHQ-2 Score 0       Patient-reported           2025   Substance Use   Alcohol more than 3/day or more than 7/wk No   Do you use any other substances recreationally? No     Social History     Tobacco Use    Smoking status: Never     Passive exposure: Never    Smokeless tobacco: Never   Vaping Use    Vaping status: Never Used   Substance Use Topics    Alcohol use: Yes     Comment: 3 drinks/week    Drug use: Never          Mammogram Screening - Patient under 40 years of age: Routine Mammogram Screening not recommended.         2025   STI Screening   New sexual partner(s) since last STI/HIV test? No     History of abnormal Pap smear: No - age 30-64 HPV with reflex Pap every 5 years recommended        Latest Ref Rng & Units 2022     3:06 PM   PAP / HPV   PAP  Negative for Intraepithelial Lesion or Malignancy (NILM)    HPV 16 DNA Negative Negative    HPV 18 DNA Negative Negative    Other HR HPV Negative Negative            2025   Contraception/Family Planning   Questions about contraception or family planning No        Reviewed and updated as needed this visit by Provider                    Patient Active Problem List   Diagnosis    Moderate persistent asthma without complication    Anxiety and depression     Past Surgical History:   Procedure Laterality Date     SECTION N/A 10/2/2023    Procedure:  section;  Surgeon: Khadijah Mckeon MD;  Location: [facility]     TOOTH EXTRACTION W/FORCEP      MOUTH SURGERY         Social History     Tobacco Use    Smoking status: Never     Passive exposure: Never    Smokeless tobacco: Never   Substance  "Use Topics    Alcohol use: Yes     Comment: 3 drinks/week     Family History   Problem Relation Age of Onset    Osteoporosis Mother     Heart Disease Father     No Known Problems Brother     Cholelithiasis Maternal Grandmother     Hypertension Maternal Grandmother     Myocardial Infarction Maternal Grandfather     Skin Cancer Paternal Grandmother     Diabetes Type 2  Paternal Grandfather     Transient ischemic attack Maternal Uncle               Objective    Exam  /84 (BP Location: Right arm, Patient Position: Sitting, Cuff Size: Adult Regular)   Pulse 71   Temp 97.8  F (36.6  C) (Tympanic)   Resp 16   Ht 1.684 m (5' 6.3\")   Wt 65.3 kg (144 lb)   LMP 03/19/2025 (Within Days)   SpO2 97%   BMI 23.03 kg/m     Estimated body mass index is 23.03 kg/m  as calculated from the following:    Height as of this encounter: 1.684 m (5' 6.3\").    Weight as of this encounter: 65.3 kg (144 lb).    Physical Exam  Constitutional: appears to be in no acute distress, comfortable, pleasant.   Eyes: anicteric, conjunctiva clear without drainage or erythema. OMAR.   Ears, Nose and Throat: tympanic membranes gray with LR,  nose without nasal discharge. OP: no erythema to posterior pharynx, negative post nasal drainage, tonsils +1 no erythema or exudate.  Neck: supple, thyroid palpable,not enlarged, no nodules   Breast: Exam deferred (deferred after discussion of exam options with patient, no symptoms or concerns).   Cardiovascular: regular rate and rhythm, normal S1 and S2, no murmurs, rubs or gallops, peripheral pulses full and symmetric; negative peripheral edema   Respiratory: Air entry throughout. Breathing pattern unlabored without the use of accessory muscles. Clear to auscultation A and P, no wheezes or crackles, normal breath sounds.    Genitourinary: Exam deferred (deferred after discussion of exam options with patient, no symptoms or concerns, pap up to date).   Musculoskeletal: full range of motion, no edema. "   Skin: pink, turgor smooth and elastic. Negative for lesions or dryness.  Neurological: normal gait, no tremor.   Psychological: appropriate mood and affect.   Lymphatic: no cervical, axillary, supraclavicular, or infraclavicular lymphadenopathy.          Signed Electronically by: KURT Tomas CNP

## 2025-05-07 ENCOUNTER — MYC MEDICAL ADVICE (OUTPATIENT)
Dept: OBGYN | Facility: CLINIC | Age: 35
End: 2025-05-07
Payer: COMMERCIAL

## 2025-05-23 PROBLEM — O09.90 SUPERVISION OF HIGH-RISK PREGNANCY: Status: ACTIVE | Noted: 2025-05-23

## 2025-05-28 ENCOUNTER — MYC REFILL (OUTPATIENT)
Dept: PEDIATRICS | Facility: CLINIC | Age: 35
End: 2025-05-28

## 2025-05-28 DIAGNOSIS — O16.3 SEVERE HYPERTENSION AFFECTING PREGNANCY IN THIRD TRIMESTER: ICD-10-CM

## 2025-05-28 DIAGNOSIS — O13.3 GESTATIONAL HYPERTENSION, THIRD TRIMESTER: ICD-10-CM

## 2025-05-28 DIAGNOSIS — O14.13 SEVERE PRE-ECLAMPSIA IN THIRD TRIMESTER: Primary | ICD-10-CM

## 2025-05-28 RX ORDER — LABETALOL 200 MG/1
200 TABLET, FILM COATED ORAL 2 TIMES DAILY
Qty: 180 TABLET | Refills: 1 | Status: SHIPPED | OUTPATIENT
Start: 2025-05-28

## 2025-05-28 NOTE — TELEPHONE ENCOUNTER
Clinic RN: Please investigate patient's chart or contact patient if the information cannot be found because the medication is listed as historical or discontinued. Confirm patient is taking this medication. Document findings and route refill encounter to provider for approval or denial.    Rosalio Silva, RN, BSN, MSN  RN Lead

## 2025-05-28 NOTE — TELEPHONE ENCOUNTER
"Attempted to reach patient, unable to leave voicemail to call back and speak with any triage nurse. If patient calls back, clinic received refill request for labetalol (NORMODYNE) 200 MG tablet. Medication is listed as \"patient reported\". Is patient taking this medication? At what dose daily? Who is prescribing for patient? [employer] message sent at this time.     Doe SCHREIBER RN 5/28/2025 at 2:05 PM    "

## 2025-05-29 DIAGNOSIS — O21.9 NAUSEA AND VOMITING DURING PREGNANCY: ICD-10-CM

## 2025-05-29 RX ORDER — METOCLOPRAMIDE 5 MG/1
TABLET ORAL
Qty: 30 TABLET | Refills: 0 | Status: SHIPPED | OUTPATIENT
Start: 2025-05-29

## 2025-05-29 NOTE — TELEPHONE ENCOUNTER
Requested Prescriptions   Pending Prescriptions Disp Refills    metoclopramide (REGLAN) 5 MG tablet [Pharmacy Med Name: METOCLOPRAMIDE 5MG TABLETS] 30 tablet 0     Sig: TAKE 1 TABLET(5 MG) BY MOUTH EVERY 8 HOURS AS NEEDED FOR VOMITING OR NAUSEA        Antivertigo/Antiemetic Agents Failed - 5/29/2025  9:27 AM        Failed - Medication is active on med list and the sig matches. RN to manually verify dose and sig if red X/fail.     If the protocol passes (green check), you do not need to verify med dose and sig.    A prescription matches if they are the same clinical intention.    For Example: once daily and every morning are the same.    The protocol can not identify upper and lower case letters as matching and will fail.     For Example: Take 1 tablet (50 mg) by mouth daily     TAKE 1 TABLET (50 MG) BY MOUTH DAILY    For all fails (red x), verify dose and sig.    If the refill does match what is on file, the RN can still proceed to approve the refill request.       If they do not match, route to the appropriate provider.             Passed - Medication indicated for associated diagnosis     The medication is prescribed for one or more of the following conditions:     Motion sickness   Nausea   Vomiting   Vertigo   Chemotherapy-induced nausea and vomiting   Radiation-induced nausea and vomiting,    Nausea and vomiting prophylaxis, migraine          Passed - Recent (12 month) or future (90 days) visit with authorizing provider's specialty (provided they have been seen in the past 15 months)     The patient must have completed an in-person or virtual visit within the past 12 months or has a future visit scheduled within the next 90 days with the authorizing provider s specialty.  Urgent care and e-visits do not qualify as an office visit for this protocol.          Passed - Patient is 18 years of age or older           Prescription approved per [facility] Refill Protocol.  Chiara Tracy RN on 5/29/2025 at 9:27 AM

## 2025-06-02 ENCOUNTER — ANCILLARY PROCEDURE (OUTPATIENT)
Dept: ULTRASOUND IMAGING | Facility: CLINIC | Age: 35
End: 2025-06-02
Payer: COMMERCIAL

## 2025-06-02 DIAGNOSIS — O36.80X0 PREGNANCY WITH INCONCLUSIVE FETAL VIABILITY: ICD-10-CM

## 2025-06-02 LAB
ABO + RH BLD: NORMAL
BLD GP AB SCN SERPL QL: NEGATIVE
SPECIMEN EXP DATE BLD: NORMAL

## 2025-06-02 PROCEDURE — 76817 TRANSVAGINAL US OBSTETRIC: CPT | Performed by: OBSTETRICS & GYNECOLOGY

## 2025-06-03 ENCOUNTER — PRENATAL OFFICE VISIT (OUTPATIENT)
Dept: OBGYN | Facility: CLINIC | Age: 35
End: 2025-06-03
Payer: COMMERCIAL

## 2025-06-03 VITALS — WEIGHT: 142 LBS | SYSTOLIC BLOOD PRESSURE: 112 MMHG | BODY MASS INDEX: 22.92 KG/M2 | DIASTOLIC BLOOD PRESSURE: 70 MMHG

## 2025-06-03 DIAGNOSIS — O09.299 HISTORY OF INTRAUTERINE GROWTH RESTRICTION IN PRIOR PREGNANCY, CURRENTLY PREGNANT: ICD-10-CM

## 2025-06-03 DIAGNOSIS — O09.291 HISTORY OF PRE-ECLAMPSIA IN PRIOR PREGNANCY, CURRENTLY PREGNANT IN FIRST TRIMESTER: ICD-10-CM

## 2025-06-03 DIAGNOSIS — J45.909 ASTHMA: ICD-10-CM

## 2025-06-03 DIAGNOSIS — F32.A ANXIETY AND DEPRESSION: ICD-10-CM

## 2025-06-03 DIAGNOSIS — O09.91 SUPERVISION OF HIGH RISK PREGNANCY IN FIRST TRIMESTER: Primary | ICD-10-CM

## 2025-06-03 DIAGNOSIS — Z13.79 GENETIC SCREENING: ICD-10-CM

## 2025-06-03 DIAGNOSIS — O09.891 HISTORY OF PRETERM DELIVERY, CURRENTLY PREGNANT IN FIRST TRIMESTER: ICD-10-CM

## 2025-06-03 DIAGNOSIS — F41.9 ANXIETY AND DEPRESSION: ICD-10-CM

## 2025-06-03 DIAGNOSIS — Z98.891 HISTORY OF CESAREAN SECTION, LOW TRANSVERSE: ICD-10-CM

## 2025-06-03 DIAGNOSIS — O09.299 HISTORY OF PRE-ECLAMPSIA IN PRIOR PREGNANCY, CURRENTLY PREGNANT: Primary | ICD-10-CM

## 2025-06-03 LAB
ALBUMIN MFR UR ELPH: 15.4 MG/DL
ALBUMIN UR-MCNC: NEGATIVE MG/DL
ALT SERPL W P-5'-P-CCNC: 13 U/L (ref 0–50)
APPEARANCE UR: CLEAR
AST SERPL W P-5'-P-CCNC: 21 U/L (ref 0–45)
BILIRUB UR QL STRIP: NEGATIVE
COLOR UR AUTO: YELLOW
CREAT SERPL-MCNC: 0.62 MG/DL (ref 0.51–0.95)
CREAT UR-MCNC: 190 MG/DL
EGFRCR SERPLBLD CKD-EPI 2021: >90 ML/MIN/1.73M2
ERYTHROCYTE [DISTWIDTH] IN BLOOD BY AUTOMATED COUNT: 13 % (ref 10–15)
EST. AVERAGE GLUCOSE BLD GHB EST-MCNC: 105 MG/DL
GLUCOSE UR STRIP-MCNC: NEGATIVE MG/DL
HBA1C MFR BLD: 5.3 % (ref 0–5.6)
HCT VFR BLD AUTO: 37.2 % (ref 35–47)
HGB BLD-MCNC: 12.7 G/DL (ref 11.7–15.7)
HGB UR QL STRIP: NEGATIVE
HIV 1+2 AB+HIV1 P24 AG SERPL QL IA: NONREACTIVE
KETONES UR STRIP-MCNC: ABNORMAL MG/DL
LEUKOCYTE ESTERASE UR QL STRIP: NEGATIVE
MCH RBC QN AUTO: 28.6 PG (ref 26.5–33)
MCHC RBC AUTO-ENTMCNC: 34.1 G/DL (ref 31.5–36.5)
MCV RBC AUTO: 84 FL (ref 78–100)
NITRATE UR QL: NEGATIVE
PH UR STRIP: 6 [PH] (ref 5–7)
PLATELET # BLD AUTO: 223 10E3/UL (ref 150–450)
PROT/CREAT 24H UR: 0.08 MG/MG CR (ref 0–0.2)
RBC # BLD AUTO: 4.44 10E6/UL (ref 3.8–5.2)
SP GR UR STRIP: >=1.03 (ref 1–1.03)
UROBILINOGEN UR STRIP-ACNC: 1 E.U./DL
WBC # BLD AUTO: 7.6 10E3/UL (ref 4–11)

## 2025-06-03 PROCEDURE — 87340 HEPATITIS B SURFACE AG IA: CPT | Performed by: STUDENT IN AN ORGANIZED HEALTH CARE EDUCATION/TRAINING PROGRAM

## 2025-06-03 PROCEDURE — 3078F DIAST BP <80 MM HG: CPT | Performed by: STUDENT IN AN ORGANIZED HEALTH CARE EDUCATION/TRAINING PROGRAM

## 2025-06-03 PROCEDURE — 84156 ASSAY OF PROTEIN URINE: CPT | Performed by: STUDENT IN AN ORGANIZED HEALTH CARE EDUCATION/TRAINING PROGRAM

## 2025-06-03 PROCEDURE — 85027 COMPLETE CBC AUTOMATED: CPT | Performed by: STUDENT IN AN ORGANIZED HEALTH CARE EDUCATION/TRAINING PROGRAM

## 2025-06-03 PROCEDURE — 87389 HIV-1 AG W/HIV-1&-2 AB AG IA: CPT | Performed by: STUDENT IN AN ORGANIZED HEALTH CARE EDUCATION/TRAINING PROGRAM

## 2025-06-03 PROCEDURE — 82565 ASSAY OF CREATININE: CPT | Performed by: STUDENT IN AN ORGANIZED HEALTH CARE EDUCATION/TRAINING PROGRAM

## 2025-06-03 PROCEDURE — 3044F HG A1C LEVEL LT 7.0%: CPT | Performed by: STUDENT IN AN ORGANIZED HEALTH CARE EDUCATION/TRAINING PROGRAM

## 2025-06-03 PROCEDURE — 99214 OFFICE O/P EST MOD 30 MIN: CPT | Performed by: STUDENT IN AN ORGANIZED HEALTH CARE EDUCATION/TRAINING PROGRAM

## 2025-06-03 PROCEDURE — 86762 RUBELLA ANTIBODY: CPT | Performed by: STUDENT IN AN ORGANIZED HEALTH CARE EDUCATION/TRAINING PROGRAM

## 2025-06-03 PROCEDURE — 86900 BLOOD TYPING SEROLOGIC ABO: CPT | Performed by: STUDENT IN AN ORGANIZED HEALTH CARE EDUCATION/TRAINING PROGRAM

## 2025-06-03 PROCEDURE — 3074F SYST BP LT 130 MM HG: CPT | Performed by: STUDENT IN AN ORGANIZED HEALTH CARE EDUCATION/TRAINING PROGRAM

## 2025-06-03 PROCEDURE — 0500F INITIAL PRENATAL CARE VISIT: CPT | Performed by: STUDENT IN AN ORGANIZED HEALTH CARE EDUCATION/TRAINING PROGRAM

## 2025-06-03 PROCEDURE — 81003 URINALYSIS AUTO W/O SCOPE: CPT | Performed by: STUDENT IN AN ORGANIZED HEALTH CARE EDUCATION/TRAINING PROGRAM

## 2025-06-03 PROCEDURE — 84460 ALANINE AMINO (ALT) (SGPT): CPT | Performed by: STUDENT IN AN ORGANIZED HEALTH CARE EDUCATION/TRAINING PROGRAM

## 2025-06-03 PROCEDURE — 86901 BLOOD TYPING SEROLOGIC RH(D): CPT | Performed by: STUDENT IN AN ORGANIZED HEALTH CARE EDUCATION/TRAINING PROGRAM

## 2025-06-03 PROCEDURE — 86780 TREPONEMA PALLIDUM: CPT | Performed by: STUDENT IN AN ORGANIZED HEALTH CARE EDUCATION/TRAINING PROGRAM

## 2025-06-03 PROCEDURE — 87086 URINE CULTURE/COLONY COUNT: CPT | Performed by: STUDENT IN AN ORGANIZED HEALTH CARE EDUCATION/TRAINING PROGRAM

## 2025-06-03 PROCEDURE — 36415 COLL VENOUS BLD VENIPUNCTURE: CPT | Performed by: STUDENT IN AN ORGANIZED HEALTH CARE EDUCATION/TRAINING PROGRAM

## 2025-06-03 PROCEDURE — 84450 TRANSFERASE (AST) (SGOT): CPT | Performed by: STUDENT IN AN ORGANIZED HEALTH CARE EDUCATION/TRAINING PROGRAM

## 2025-06-03 PROCEDURE — 86803 HEPATITIS C AB TEST: CPT | Performed by: STUDENT IN AN ORGANIZED HEALTH CARE EDUCATION/TRAINING PROGRAM

## 2025-06-03 PROCEDURE — 83036 HEMOGLOBIN GLYCOSYLATED A1C: CPT | Performed by: STUDENT IN AN ORGANIZED HEALTH CARE EDUCATION/TRAINING PROGRAM

## 2025-06-03 PROCEDURE — G2211 COMPLEX E/M VISIT ADD ON: HCPCS | Performed by: STUDENT IN AN ORGANIZED HEALTH CARE EDUCATION/TRAINING PROGRAM

## 2025-06-03 PROCEDURE — 86850 RBC ANTIBODY SCREEN: CPT | Performed by: STUDENT IN AN ORGANIZED HEALTH CARE EDUCATION/TRAINING PROGRAM

## 2025-06-03 RX ORDER — PYRIDOXINE HCL (VITAMIN B6) 25 MG
25 TABLET ORAL DAILY
Qty: 90 TABLET | Refills: 2 | Status: SHIPPED | OUTPATIENT
Start: 2025-06-03

## 2025-06-03 RX ORDER — ASPIRIN 81 MG/1
81 TABLET ORAL DAILY
Qty: 90 TABLET | Refills: 3 | Status: SHIPPED | OUTPATIENT
Start: 2025-06-03

## 2025-06-03 NOTE — PROGRESS NOTES
SUBJECTIVE:      HPI:     This is a 34 year old female patient,  who presents for her first obstetrical visit.     PIO: 2025, by Last Menstrual Period. Her cycles are regular.  Her last menstrual period was normal.   Since her LMP, she has experienced  nausea and emesis. Reglan has been helping and she is tolerating PO well. Prior pregnancy had issues with nausea/vomiting until 21 weeks. Denies any abdominal/pelvic pain, vaginal bleeding, abnormal discharge, difficulties with bladder or bowels. BPs have been well managed on labetalol since her last delivery. No concerns today.     Additional History:   Severe PreE and FGR delivered at 29w 6d via PLTCS  Currently on Labetalol 200 mg BID. Prior pregnancy required multiple medications post delivery for BP control.      Have you travelled during the pregnancy?No  Have your sexual partner(s) travelled during the pregnancy?No        HISTORY:   Planned Pregnancy: Yes  Marital Status:   Occupation: [employer] with [employer]  Living in Household: [employer] Jhon Casey     Past History:  Her past medical history   Past Medical History        Past Medical History:   Diagnosis Date    Anxiety and depression      Depressive disorder 2013    Hypertension 2023     Preeclampsia    Moderate persistent asthma without complication      Preeclampsia, severe 10/07/2023    Severe hypertension affecting pregnancy in third trimester 2023      .       She has a history of  pre-term delivery at 29 weeks, preeclampsia, and prior  section     Since her last LMP she denies use of alcohol, tobacco and street drugs.     Past medical, surgical, social and family history were reviewed and updated in EPIC.        Current Facility-Administered Medications          Current Outpatient Medications   Medication Sig Dispense Refill    albuterol (PROAIR HFA/PROVENTIL HFA/VENTOLIN HFA) 108 (90 Base) MCG/ACT inhaler Inhale 2 puffs into the lungs every 6 hours 18 g 3     cetirizine (ZYRTEC) 10 MG tablet Take 10 mg by mouth daily.        EPINEPHrine (ANY BX GENERIC EQUIV) 0.3 MG/0.3ML injection 2-pack Inject 0.3 mLs (0.3 mg) into the muscle as needed for anaphylaxis May repeat one time in 5-15 minutes if response to initial dose is inadequate. 2 each 3    fluticasone (FLOVENT HFA) 110 MCG/ACT inhaler Inhale 1 puff into the lungs 2 times daily 12 g 0    metoclopramide (REGLAN) 5 MG tablet Take 1 tablet (5 mg) by mouth every 8 hours as needed for vomiting (nausea and vomiting of pregnancy). 30 tablet 0    montelukast (SINGULAIR) 10 MG tablet GENERIC EQUIVALENT FOR SINGULAIR; TAKE ONE TABLET(10MG) BY MOUTH AT BEDTIME 90 tablet 1    Prenat w/o A-FeCbGl-DSS-FA-DHA (PNV OB+DHA PO) Take by mouth.        sertraline (ZOLOFT) 100 MG tablet Take 1 tablet (100 mg) by mouth daily. 90 tablet 3      No current facility-administered medications for this visit.            ROS:   12 point review of systems negative other than symptoms noted below or in the HPI.  Gastrointestinal: Nausea and Vomiting     Confirmed patient desires delivery at [facility] with the [facility] provider.     Nurse phone visit completed. Prenatal book and folder (containing standard educational hand-outs and brochures) will be given at the next visit to patient. Information in folder reviewed over the phone. Questions answered. Information given on optional screening available to assess chromosomal anomalies. Pt desires NIPS. Pt advised to call the clinic if she has any questions or concerns related to her pregnancy. Prenatal labs future ordered.   Chiara Tracy RN on 5/23/2025 at 10:37 AM        Last pap: 01/01/2022 NIL, NEG-HPV     PHQ-9 score:         5/23/2025     9:35 AM   PHQ   PHQ-9 Total Score 4    Q9: Thoughts of better off dead/self-harm past 2 weeks Not at all       Patient-reported                          5/10/2023    11:13 AM 12/1/2023    11:27 AM 5/23/2025     9:35 AM   JAQUELIN-7 SCORE    Total Score 3 (minimal anxiety)   2 (minimal anxiety)   Total Score 3 2 2        Patient-reported               Patient supplied answers from flow sheet for:  Prenatal OB Questionnaire.  Past Medical History  Have you ever recieved care for your mental health? : (!) (Patient-Rptd) Yes  Have you ever been in a major accident or suffered serious trauma?: (Patient-Rptd) No  Within the last year, has anyone hit, slapped, kicked or otherwise hurt you?: (Patient-Rptd) No  In the last year, has anyone forced you to have sex when you didn't want to?: (Patient-Rptd) No     Past Medical History 2   Have you ever received a blood transfusion?: (Patient-Rptd) No  Would you accept a blood transfusion if was medically recommended?: (Patient-Rptd) Yes  Does anyone in your home smoke?: (Patient-Rptd) No   Is your blood type Rh negative?: (Patient-Rptd) No  Have you ever ?: (!) (Patient-Rptd) Yes  Have you been hospitalized for a nonsurgical reason excluding normal delivery?: (Patient-Rptd) Unknown  Have you ever had an abnormal pap smear?: (Patient-Rptd) No     Past Medical History (Continued)  Do you have a history of abnormalities of the uterus?: (Patient-Rptd) No  Did your mother take KVNG or any other hormones when she was pregnant with you?: (Patient-Rptd) No  Do you have any other problems we have not asked about which you feel may be important to this pregnancy?: (Patient-Rptd) No        OBJECTIVE:     EXAM:  /70   Wt 64.4 kg (142 lb)   LMP 03/20/2025   BMI 22.92 kg/m   Body mass index is 22.92 kg/m .    GENERAL: alert and no distress  EYES: Eyes grossly normal to inspection, PERRL and conjunctivae and sclerae normal  NECK: no adenopathy, no asymmetry, masses, or scars  RESP: lungs clear to auscultation - no rales, rhonchi or wheezes  CV: regular rate and rhythm, normal S1 S2, no S3 or S4, no murmur, click or rub, no peripheral edema  ABDOMEN: soft, nontender, no hepatosplenomegaly, no masses and bowel  sounds normal  MS: no gross musculoskeletal defects noted, no edema  SKIN: no suspicious lesions or rashes  NEURO: Normal strength and tone, mentation intact and speech normal  PSYCH: mentation appears normal, affect normal/bright    ASSESSMENT/PLAN:       ICD-10-CM    1. Supervision of high risk pregnancy in first trimester  O09.91 pyridOXINE (VITAMIN B6) 25 MG tablet     US OB > 14 Weeks     ABO/Rh type and screen     Hepatitis B surface antigen     CBC with platelets     HIV Antigen Antibody Combo     Rubella Antibody IgG     Treponema Abs w Reflex to RPR and Titer     Urine Culture Aerobic Bacterial     *UA reflex to Microscopic     Hepatitis C antibody     Hemoglobin A1c      2. History of pre-eclampsia in prior pregnancy, currently pregnant in first trimester  O09.291 Protein  random urine     ALT     AST     Creatinine     aspirin 81 MG EC tablet     [facility] Referral - Pregnancy     Protein  random urine     ALT     AST     Creatinine      3. History of  delivery, currently pregnant in first trimester  O09.891 [facility]tr Referral - Pregnancy      4. Genetic screening  Z13.79 [facility] Non-Invasive Prenatal Screening-Prequel      5. History of  section, low transverse  Z98.891           34 year old  at 10w5d weeks by LMP c/w 10 week US here for initial prenatal visit.    Normal First OB Visit  - Labs ordered: UA/UCx, CBC, Type and Screen, RPR, Hep B, Hep C, Rubella, HIV, Hgb A1c. Pap UTD, due .  - Prenatal testing: reviewed options for genetic screening, patient desires NIPS screening. Myriad drawn today.  - N/V: doing well with reglan and will continue. Discussed adding Vitamin B6 25mg TID and pepcid/tums. Unisom made her feel too groggy and she would like to avoid it this time. Will let us know if symptoms not improving.   - Prepregnancy BMI:23. Recommended weight gain for pregnancy: 25-35 lbs.  - Encouraged PNV, healthy diet and exercise during pregnancy  - Anatomy  scan at 18-20 weeks    Hx of PTD and FGR due to Severe Preeclampsia at 29 weeks  - Additional labs ordered: baseline AST, ALT, serum Creatinine, urine protein:creatinine ratio  - BP normotensive today. Continue labetalol 200mg BID  - Encouraged to start 81mg ASA starting at 12 weeks for preclampsia prevention  - MFM consult ordered  - Reviewed risks of preeclampsia this pregnancy, signs and symptoms to monitor for, continued BP monitoring and medication adjustment if needed, and  screening/delivery planning pending rest of pregnancy  - If pregnancy uncomplicated, would still recommend 3rd trimester growth given hx    Hx of CS x1  - Records review shows LTCS. No contraindications for .  - Selena is interested in planning RCS. Discussed timing pending pregnancy course but if uncomplicated, typically at 39 weeks.    - follow up for return OB appointment in 4-6 weeks  - precautions reviewed for abdominal/pelvic pain, vaginal bleeding, or other concerning symptoms    Mela Clayton MD

## 2025-06-04 LAB
HBV SURFACE AG SERPL QL IA: NONREACTIVE
HCV AB SERPL QL IA: NONREACTIVE
T PALLIDUM AB SER QL: NONREACTIVE

## 2025-06-05 ENCOUNTER — RESULTS FOLLOW-UP (OUTPATIENT)
Dept: OBGYN | Facility: CLINIC | Age: 35
End: 2025-06-05

## 2025-06-05 DIAGNOSIS — O09.891 HISTORY OF PRETERM DELIVERY, CURRENTLY PREGNANT IN FIRST TRIMESTER: ICD-10-CM

## 2025-06-05 DIAGNOSIS — O09.291 HISTORY OF PRE-ECLAMPSIA IN PRIOR PREGNANCY, CURRENTLY PREGNANT IN FIRST TRIMESTER: Primary | ICD-10-CM

## 2025-06-05 DIAGNOSIS — Z98.891 HISTORY OF CESAREAN SECTION, LOW TRANSVERSE: ICD-10-CM

## 2025-06-05 LAB
BACTERIA UR CULT: NORMAL
RUBV IGG SERPL QL IA: 0.93 INDEX
RUBV IGG SERPL QL IA: NORMAL

## 2025-06-12 ENCOUNTER — PRE VISIT (OUTPATIENT)
Dept: MATERNAL FETAL MEDICINE | Facility: CLINIC | Age: 35
End: 2025-06-12
Payer: COMMERCIAL

## 2025-06-12 DIAGNOSIS — O09.91 SUPERVISION OF HIGH RISK PREGNANCY IN FIRST TRIMESTER: Primary | ICD-10-CM

## 2025-06-16 ENCOUNTER — MYC MEDICAL ADVICE (OUTPATIENT)
Dept: OBGYN | Facility: CLINIC | Age: 35
End: 2025-06-16
Payer: COMMERCIAL

## 2025-06-16 LAB — SCANNED LAB RESULT: NORMAL

## 2025-06-19 ENCOUNTER — OFFICE VISIT (OUTPATIENT)
Dept: MATERNAL FETAL MEDICINE | Facility: CLINIC | Age: 35
End: 2025-06-19
Attending: OBSTETRICS & GYNECOLOGY
Payer: COMMERCIAL

## 2025-06-19 ENCOUNTER — HOSPITAL ENCOUNTER (OUTPATIENT)
Dept: ULTRASOUND IMAGING | Facility: CLINIC | Age: 35
End: 2025-06-19
Attending: OBSTETRICS & GYNECOLOGY
Payer: COMMERCIAL

## 2025-06-19 VITALS — DIASTOLIC BLOOD PRESSURE: 95 MMHG | HEART RATE: 89 BPM | SYSTOLIC BLOOD PRESSURE: 130 MMHG

## 2025-06-19 DIAGNOSIS — O09.91 SUPERVISION OF HIGH RISK PREGNANCY IN FIRST TRIMESTER: ICD-10-CM

## 2025-06-19 DIAGNOSIS — O09.291 HISTORY OF PRE-ECLAMPSIA IN PRIOR PREGNANCY, CURRENTLY PREGNANT IN FIRST TRIMESTER: Primary | ICD-10-CM

## 2025-06-19 DIAGNOSIS — O09.291 HISTORY OF PRE-ECLAMPSIA IN PRIOR PREGNANCY, CURRENTLY PREGNANT IN FIRST TRIMESTER: ICD-10-CM

## 2025-06-19 DIAGNOSIS — O09.891 HISTORY OF PRETERM DELIVERY, CURRENTLY PREGNANT IN FIRST TRIMESTER: ICD-10-CM

## 2025-06-19 DIAGNOSIS — Z98.891 HISTORY OF CESAREAN SECTION, LOW TRANSVERSE: ICD-10-CM

## 2025-06-19 PROCEDURE — 76801 OB US < 14 WKS SINGLE FETUS: CPT

## 2025-06-19 NOTE — NURSING NOTE
Pt is a  at 13w0d here for NT and MFM consult r/t hx severe pre-e and PTD.  Patient reports no fetal movement yet, no pain, no contractions, leaking of fluid, or bleeding. She does not some occasional mild cramping.   SBAR given to SHANIQUA GIRALDO, see their note in Epic.

## 2025-06-19 NOTE — PROGRESS NOTES
Maternal-Fetal Medicine Consultation    Selena Dutton  : 1990  MRN: [id number]    REFERRAL:  Selena Dutton is a 34 year old sent by Dr. Clayton  for MFM consultation.    HPI:  Selena Dutton is a 34 year old  at 13w0d by LMP consistent with 10w4d US here for MFM consultation regarding a history of preeclampsia with severe features.    She is here with her partner.     She is doing well in this pregancy thus far. She is having some nausea but is overall able to tolerate PO. She denies any VB, LOF or abdominal pain.     Regarding her last pregnancy, she reports that from the beginning, the fetal growth mesaurements were measuring 6 days off. In her late 2nd trimester she was diagnosed with FGR. She had some leg swelling at the beginning of her 3rd trimester. She was sent to our MFM office for further evaluation, and that day she had severe range BP and was sent inpatient for management. She was in the ICU for 2 days for a Nicardipine drip to control her BP. She was transitioned to PO meds but continued to have elevated BP and the plan was made for delivery. She states baby did well and spent 7 weeks in the NICU. She was admitted for and additional 6-7 days after delivery to obtain BP control. She was on 4-5 meds for BP control and subsequently weaned off her medications but continued to Labetalol since delivery. She denies any current s/s of elevated BP.          Obstetrics History:  OB History    Para Term  AB Living   2 1 0 1 0 1   SAB IAB Ectopic Multiple Live Births   0 0 0 0 1      # Outcome Date GA Lbr Derick/2nd Weight Sex Type Anes PTL Lv   2 Current            1  10/02/23 29w6d  1.1 kg (2 lb 6.8 oz) M CS-LTranv   TERE      Name: Carmela      Apgar1: 8  Apgar5: 8       Past Medical History:  Past Medical History:   Diagnosis Date    Anxiety and depression     Depressive disorder     Hypertension 2023    Preeclampsia    Moderate persistent asthma without complication      Preeclampsia, severe 10/07/2023    Severe hypertension affecting pregnancy in third trimester 2023       Past Surgical History:  Past Surgical History:   Procedure Laterality Date     SECTION N/A 10/2/2023    Procedure:  section;  Surgeon: Khadijah Mckeon MD;  Location:  L+D     TOOTH EXTRACTION W/FORCEP      MOUTH SURGERY         Current Medications:  Prior to Admission medications    Medication Sig Last Dose Taking? Auth Provider Long Term End Date   albuterol (PROAIR HFA/PROVENTIL HFA/VENTOLIN HFA) 108 (90 Base) MCG/ACT inhaler Inhale 2 puffs into the lungs every 6 hours   Hazel Ghotra MD Yes    aspirin 81 MG EC tablet Take 1 tablet (81 mg) by mouth daily.   Mela Clayton MD     cetirizine (ZYRTEC) 10 MG tablet Take 10 mg by mouth daily.   Reported, Patient     EPINEPHrine (ANY BX GENERIC EQUIV) 0.3 MG/0.3ML injection 2-pack Inject 0.3 mLs (0.3 mg) into the muscle as needed for anaphylaxis May repeat one time in 5-15 minutes if response to initial dose is inadequate.   Sary Meyer APRN CNP     fluticasone (FLOVENT HFA) 110 MCG/ACT inhaler Inhale 1 puff into the lungs 2 times daily   Hazel Ghotra MD Yes    labetalol (NORMODYNE) 200 MG tablet Take 1 tablet (200 mg) by mouth 2 times daily.   Sary Meyer APRN CNP Yes    metoclopramide (REGLAN) 5 MG tablet TAKE 1 TABLET(5 MG) BY MOUTH EVERY 8 HOURS AS NEEDED FOR VOMITING OR NAUSEA   Karen Webber MD     montelukast (SINGULAIR) 10 MG tablet GENERIC EQUIVALENT FOR SINGULAIR; TAKE ONE TABLET(10MG) BY MOUTH AT BEDTIME   Blaire Jade, BRIJESH Yes    ondansetron (ZOFRAN) 4 MG tablet TAKE ONE TABLET BY MOUTH AS NEEDED EVERY 4 TO 6 HOURS UP TO THREE TIMES DAILY TO PREVENT NAUSEA  Patient not taking: Reported on 6/3/2025   Reported, Patient     Prenat w/o A-FeCbGl-DSS-FA-DHA (PNV OB+DHA PO) Take by mouth.   Reported, Patient     pyridOXINE (VITAMIN B6) 25 MG tablet Take 1 tablet (25 mg) by mouth daily.   Mela Clayton MD      sertraline (ZOLOFT) 100 MG tablet Take 1 tablet (100 mg) by mouth daily.   Sary Meyer, KURT CNP Yes        Allergies:  Nuts, Cat dander, and Peanut (diagnostic)      BP (!) 130/95 (BP Location: Left arm, Patient Position: Sitting, Cuff Size: Adult Regular)   Pulse 89   LMP 2025          PHYSICAL EXAM:  Deferred     Other Imaging:   Single live IUP -- please see formal report for details    ASSESSMENT/PLAN:  Selena Dutton is a 34 year old  at 13w0d here for [facility] consultation regarding:     #History of severe preeclampsia, now with CHTN  - We reviewed the recurrence risk of preeclampsia. In general, the earlier  and the more severe or the preeclampsia the higher the recurrence risk. In one study, women with preeclampsia delivered after 37 weeks had a 14% risk of recurrence but this risk increased to almost 40% in preeclamptic women who delivered before 28 weeks. Regarding eclampsia, the risk of recurrent hypertensive disorders is as high as 30-50% but the risk of recurrent eclampsia is lower at 5% in one study.  - She was advised to continue her Labetalol for BP control.   - We reviewed the fetal and maternal morbidity associated with a pregnancy complicated by chronic hypertension including an increased risk for preeclampsia, placental abruption, fetal growth restriction,  birth and fetal demise.  -  We outlined an US surveillance strategy with  testing: serial growth scans q 4  weeks starting at 28 weeks and weekly  testing at 32 weeks unless indicated earlier. We discussed timing of delivery to occur between 38-39 weeks.   - With acute elevations in BP, or symptoms of preeclampsia, she should present to the  hospital for evaluation. Antihypertensive therapy in pregnancy may be adjusted to  maintain BP<140/90. DBP was elevated today but she states it has been normal. She has some anxiety coming back to our office based on her last experience. She will monitor it at home.    - We reviewed the recommendations for low dose Aspirin reduce her risk of preeclampsia. She reports she is taking it.  - We recommend a baseline evaluation (creatinine, platelets, LFTs and a urine  protein:creatinine ratio). These have been completed and appear normal. I would also recommend considering Antiphospholipid Screening (LAC, ACL, Beta 2 glycoprotein) screening given hx of severe preeclampsia with  birth < 34 weeks, if not already completed  - Home BP monitoring is advised. We discussed s/s of preeclampsia and elevated BP  cutoffs.    #  x 1 - she is planning for a RCS      Thank you for allowing us to participate in the care of your patient. Please do not hesitate to contact us if you have further questions regarding the management of your patient.     I have seen and evaluated the patient. I spent a total of 45 minutes on the date of this encounter including preparing to see the patient (reviewing medical records/tests), counseling and discussing the plan of care, documenting the visit in the electronic medical record, and communicating with other health care professionals and/or care coordination.      Angelica Herrmann MD  Maternal Fetal Medicine   2025 1:44 PM

## 2025-07-01 ENCOUNTER — MYC MEDICAL ADVICE (OUTPATIENT)
Dept: OBGYN | Facility: CLINIC | Age: 35
End: 2025-07-01
Payer: COMMERCIAL

## 2025-07-01 DIAGNOSIS — O16.1 HYPERTENSION AFFECTING PREGNANCY IN FIRST TRIMESTER: Primary | ICD-10-CM

## 2025-07-02 ENCOUNTER — NURSE TRIAGE (OUTPATIENT)
Dept: NURSING | Facility: CLINIC | Age: 35
End: 2025-07-02
Payer: COMMERCIAL

## 2025-07-02 RX ORDER — LABETALOL 100 MG/1
100 TABLET, FILM COATED ORAL 2 TIMES DAILY
Qty: 180 TABLET | Refills: 0 | Status: SHIPPED | OUTPATIENT
Start: 2025-07-02

## 2025-07-02 RX ORDER — LABETALOL 300 MG/1
300 TABLET, FILM COATED ORAL 2 TIMES DAILY
Qty: 60 TABLET | Refills: 5 | Status: SHIPPED | OUTPATIENT
Start: 2025-07-02 | End: 2025-07-02 | Stop reason: DRUGHIGH

## 2025-07-02 NOTE — TELEPHONE ENCOUNTER
Mela Clayton MD to Me  (Selected Message)  DON      7/2/25  8:48 AM  We can increase her labetalol dose to 300mg BID. I've sent a prescription and we can talk more at her visit tomorrow    Pt informed of the  new Rx sent. She already took her 200 mg tablet - has lots of these at home.    Rx sent for Labetalol 100 mg tablets to add to her current 200 mg tablet stock she has at home. She will take 100 + 200 mg to equal 300 mg BID going forward and will start when Rx received.    Pt verbalized understanding, in agreement with plan, and voiced no further questions.    Laly Isbell RN on 7/2/2025 at 8:58 AM

## 2025-07-02 NOTE — TELEPHONE ENCOUNTER
RN CALLED and spoke to patient    /95 while on the phone w after hours nurse this morning at 0730  Yesterday had a HA but no HA this morning  Denies visual changes or RUQ pain.  Swelling in left foot is much better this morning (was quite swollen last evening)    BP's over the last few days:  Friday 129/89  Monday evening - 140/95 and 141/97  Today 138/95     Labetalol 200 mg BID  - took her 1st dose at 0745 today otherwise normally takes it 10a-10p  ASA taken daily at night    Has apt tomorrow w Dr Clayton for prenatal visit    Running this by Dr Clayton to see if any changes should be made prior to tomorrow's visit. Pt will stay on top of BP readings and send via [employer] as well.    Laly Isbell RN on 7/2/2025 at 8:03 AM

## 2025-07-02 NOTE — TELEPHONE ENCOUNTER
"OB Triage Call      Is patient's OB/Midwife with the [facility] or [facility]? [facility]- Proceed with triage     Reason for call: High BP    Assessment:    14w6d  Pt reports BP of 147/97 last night. With associated left foot swelling, now improving. History of preeclampsia in prior pregnancy. Currently on labetalol 200 mg BID (taken at 10:30 AM and 10:00 PM).    BP this morning 138/95. Denies current headache; notes headache last night, resolved with Tylenol. Also reports transient left-sided chest pain a few days ago, resolved. No chest pain today.      No dyspnea, no dizziness, no hemiplegia, no hemiparesis.    Plan: Be seen within 3 days.   Advise pt to continue monitoring and logging blood pressure readings. Instructed to call back if further concerns arise.    Pt has prenatal visit scheduled tomorrow, 7/3 at 8:00 with Dr. Clayton. Does PCP recommend an earlier evaluation, or is it acceptable to wait until the scheduled appointment? Please call pt for updates [phone]    Patient plans to deliver at N/A - early pregnancy    Patient's primary OB Provider is Dr. Clayton ([place]).      Per protocol recommendations Patient to schedule follow up appointment within 3 days.      Is patient's delivering hospital on divert? Does not apply due to Patient to schedule appointment       14w6d    Estimated Date of Delivery: Dec 25, 2025        OB History    Para Term  AB Living   2 1 0 1 0 1   SAB IAB Ectopic Multiple Live Births   0 0 0 0 1      # Outcome Date GA Lbr Derick/2nd Weight Sex Type Anes PTL Lv   2 Current            1  10/02/23 29w6d  1.1 kg (2 lb 6.8 oz) M CS-LTranv   TERE      Name: Carmela      Apgar1: 8  Apgar5: 8       No results found for: \"GBS\"       Kianna Reyes RN 14 weeks pregnant          Reason for Disposition   [1] Systolic BP  >= 130 OR Diastolic >= 80 AND [2] pregnant    Additional Information   Negative: Difficult to awaken or acting confused (e.g., disoriented, " slurred speech)   Negative: SEVERE difficulty breathing (e.g., struggling for each breath, speaks in single words)   Negative: [1] Weakness of the face, arm or leg on one side of the body AND [2] new-onset   Negative: [1] Numbness (i.e., loss of sensation) of the face, arm or leg on one side of the body AND [2] new-onset   Negative: [1] Chest pain lasts > 5 minutes AND [2] history of heart disease (i.e., heart attack, bypass surgery, angina, angioplasty, CHF)   Negative: [1] Chest pain AND [2] took nitrogylcerin AND [3] pain was not relieved   Negative: Sounds like a life-threatening emergency to the triager   Negative: Symptom is main concern (e.g., headache, chest pain)   Negative: Low blood pressure is main concern   Negative: [1] Systolic BP  >= 160 OR Diastolic >= 100 AND [2] cardiac (e.g., breathing difficulty, chest pain) or neurologic symptoms (e.g., new-onset blurred or double vision, unsteady gait)   Negative: [1] Pregnant 20 or more weeks (or postpartum < 6 weeks) AND [2] new hand or face swelling   Negative: [1] Pregnant 20 or more weeks (or postpartum < 6 weeks) AND [2] Systolic BP >= 160 OR Diastolic >= 110   Negative: [1] Systolic BP  >= 200 OR Diastolic >= 120 AND [2] having NO cardiac or neurologic symptoms   Negative: [1] Pregnant 20 or more weeks (or postpartum < 6 weeks) AND [2] Systolic BP  >= 140 OR Diastolic >= 90   Negative: [1] Systolic BP  >= 180 OR Diastolic >= 110 AND [2] missed most recent dose of blood pressure medication   Negative: Systolic BP  >= 180 OR Diastolic >= 110   Negative: Ran out of BP medications   Negative: Systolic BP  >= 160 OR Diastolic >= 100   Negative: [1] Taking BP medications AND [2] feels is having side effects (e.g., impotence, cough, dizzy upon standing)    Protocols used: Blood Pressure - High-A-

## 2025-07-03 ENCOUNTER — PRENATAL OFFICE VISIT (OUTPATIENT)
Dept: OBGYN | Facility: CLINIC | Age: 35
End: 2025-07-03
Payer: COMMERCIAL

## 2025-07-03 VITALS
HEIGHT: 66 IN | DIASTOLIC BLOOD PRESSURE: 86 MMHG | SYSTOLIC BLOOD PRESSURE: 126 MMHG | WEIGHT: 140.6 LBS | BODY MASS INDEX: 22.6 KG/M2

## 2025-07-03 DIAGNOSIS — O09.891 HISTORY OF PRETERM DELIVERY, CURRENTLY PREGNANT IN FIRST TRIMESTER: ICD-10-CM

## 2025-07-03 DIAGNOSIS — O09.291 HISTORY OF PRE-ECLAMPSIA IN PRIOR PREGNANCY, CURRENTLY PREGNANT IN FIRST TRIMESTER: ICD-10-CM

## 2025-07-03 DIAGNOSIS — O16.1 HYPERTENSION AFFECTING PREGNANCY IN FIRST TRIMESTER: ICD-10-CM

## 2025-07-03 DIAGNOSIS — Z98.891 HISTORY OF CESAREAN SECTION, LOW TRANSVERSE: ICD-10-CM

## 2025-07-03 DIAGNOSIS — O09.91 SUPERVISION OF HIGH RISK PREGNANCY IN FIRST TRIMESTER: Primary | ICD-10-CM

## 2025-07-03 NOTE — PROGRESS NOTES
"Selena Dutton is a 34 year old  at 15w0d by LMP c/w 10 wk US here today for routine OB visit.    Doing well and denies vaginal bleeding, abnormal discharge, LOF, abdominal pain. Denies HA, change in vision, SOB, CP, severe N/V, RUQ tenderness, severe swelling. Started taking labetalol 300mg BID last night and BPs today normotensive.     /86 (BP Location: Right arm, Patient Position: Sitting, Cuff Size: Adult Regular)   Ht 1.676 m (5' 6\")   Wt 63.8 kg (140 lb 9.6 oz)   LMP 2025   BMI 22.69 kg/m        ICD-10-CM    1. Supervision of high risk pregnancy in first trimester  O09.91       2. History of pre-eclampsia in prior pregnancy, currently pregnant in first trimester  O09.291 Lupus Anticoagulant Panel     CARDIOLIPIN AB (IGG, IGM)     Beta 2 Glycoprotein Antibodies IGG IGM      3. History of  delivery, currently pregnant in first trimester  O09.891       4. Hypertension affecting pregnancy in first trimester  O16.1       5. History of  section, low transverse  Z98.891           - 1T labs normal. NIPs low risk. Declined AFP  - baseline pree labs WNL  - MFM visit . Rec APLS testing -> ordered   - MFM anatomy   - continue labetalol 300mg BID  - growth q4 wks @ 28 wks, weekly BPPs @ 32 wks  - taking PNV and ASA  - Precautions reviewed  - Follow-up MFM next month and with me afterwards    Mela Clayton MD    "

## 2025-07-17 ENCOUNTER — MYC MEDICAL ADVICE (OUTPATIENT)
Dept: OBGYN | Facility: CLINIC | Age: 35
End: 2025-07-17
Payer: COMMERCIAL

## 2025-07-31 ENCOUNTER — HOSPITAL ENCOUNTER (OUTPATIENT)
Dept: ULTRASOUND IMAGING | Facility: CLINIC | Age: 35
End: 2025-07-31
Attending: OBSTETRICS & GYNECOLOGY
Payer: COMMERCIAL

## 2025-07-31 ENCOUNTER — OFFICE VISIT (OUTPATIENT)
Dept: MATERNAL FETAL MEDICINE | Facility: CLINIC | Age: 35
End: 2025-07-31
Attending: OBSTETRICS & GYNECOLOGY
Payer: COMMERCIAL

## 2025-07-31 DIAGNOSIS — O10.919 CHRONIC HYPERTENSION IN PREGNANCY: Primary | ICD-10-CM

## 2025-07-31 DIAGNOSIS — O09.892 HISTORY OF PRETERM DELIVERY, CURRENTLY PREGNANT IN SECOND TRIMESTER: ICD-10-CM

## 2025-07-31 DIAGNOSIS — O09.91 SUPERVISION OF HIGH RISK PREGNANCY IN FIRST TRIMESTER: ICD-10-CM

## 2025-07-31 DIAGNOSIS — O09.299 HISTORY OF INTRAUTERINE GROWTH RESTRICTION IN PRIOR PREGNANCY, CURRENTLY PREGNANT: ICD-10-CM

## 2025-07-31 DIAGNOSIS — O09.891 HISTORY OF PRETERM DELIVERY, CURRENTLY PREGNANT IN FIRST TRIMESTER: ICD-10-CM

## 2025-07-31 DIAGNOSIS — O09.299 HISTORY OF PRE-ECLAMPSIA IN PRIOR PREGNANCY, CURRENTLY PREGNANT: ICD-10-CM

## 2025-07-31 DIAGNOSIS — F32.A ANXIETY AND DEPRESSION: ICD-10-CM

## 2025-07-31 DIAGNOSIS — F41.9 ANXIETY AND DEPRESSION: ICD-10-CM

## 2025-07-31 DIAGNOSIS — J45.909 ASTHMA: ICD-10-CM

## 2025-07-31 PROCEDURE — 76811 OB US DETAILED SNGL FETUS: CPT

## 2025-07-31 NOTE — PROGRESS NOTES
The patient was seen for an ultrasound in the Maternal-Fetal Medicine Center at the [facility] [address] today.  For a detailed report of the ultrasound examination, please see the ultrasound report which can be found under the imaging tab.    If you have questions regarding today's evaluation or if we can be of further service, please contact the Maternal-Fetal Medicine Center.    Gloria Nielsen MD  Maternal-Fetal Medicine

## 2025-08-13 ENCOUNTER — PRENATAL OFFICE VISIT (OUTPATIENT)
Dept: OBGYN | Facility: CLINIC | Age: 35
End: 2025-08-13
Attending: STUDENT IN AN ORGANIZED HEALTH CARE EDUCATION/TRAINING PROGRAM
Payer: COMMERCIAL

## 2025-08-13 VITALS
SYSTOLIC BLOOD PRESSURE: 110 MMHG | BODY MASS INDEX: 23.21 KG/M2 | DIASTOLIC BLOOD PRESSURE: 70 MMHG | WEIGHT: 144.4 LBS | HEIGHT: 66 IN

## 2025-08-13 DIAGNOSIS — O09.91 SUPERVISION OF HIGH RISK PREGNANCY IN FIRST TRIMESTER: Primary | ICD-10-CM

## 2025-08-13 DIAGNOSIS — Z36.9 ENCOUNTER FOR ANTENATAL SCREENING OF MOTHER: ICD-10-CM

## 2025-08-13 DIAGNOSIS — O16.2 HYPERTENSION AFFECTING PREGNANCY IN SECOND TRIMESTER: ICD-10-CM

## 2025-08-13 DIAGNOSIS — Z3A.20 20 WEEKS GESTATION OF PREGNANCY: ICD-10-CM

## 2025-08-13 DIAGNOSIS — Z98.891 HISTORY OF CESAREAN SECTION, LOW TRANSVERSE: ICD-10-CM

## 2025-08-13 DIAGNOSIS — O09.291 HISTORY OF PRE-ECLAMPSIA IN PRIOR PREGNANCY, CURRENTLY PREGNANT IN FIRST TRIMESTER: ICD-10-CM

## 2025-08-13 DIAGNOSIS — O09.891 HISTORY OF PRETERM DELIVERY, CURRENTLY PREGNANT IN FIRST TRIMESTER: ICD-10-CM

## 2025-08-13 PROCEDURE — 3074F SYST BP LT 130 MM HG: CPT | Performed by: STUDENT IN AN ORGANIZED HEALTH CARE EDUCATION/TRAINING PROGRAM

## 2025-08-13 PROCEDURE — 0502F SUBSEQUENT PRENATAL CARE: CPT | Performed by: STUDENT IN AN ORGANIZED HEALTH CARE EDUCATION/TRAINING PROGRAM

## 2025-08-13 PROCEDURE — 3078F DIAST BP <80 MM HG: CPT | Performed by: STUDENT IN AN ORGANIZED HEALTH CARE EDUCATION/TRAINING PROGRAM

## 2025-08-13 PROCEDURE — 99207 PR PRENATAL VISIT: CPT | Performed by: STUDENT IN AN ORGANIZED HEALTH CARE EDUCATION/TRAINING PROGRAM

## 2025-08-13 PROCEDURE — 85390 FIBRINOLYSINS SCREEN I&R: CPT | Mod: 26 | Performed by: PATHOLOGY

## 2025-08-13 PROCEDURE — 36415 COLL VENOUS BLD VENIPUNCTURE: CPT | Performed by: STUDENT IN AN ORGANIZED HEALTH CARE EDUCATION/TRAINING PROGRAM

## 2025-08-14 LAB
B2 GLYCOPROT1 IGG SERPL IA-ACNC: <0.8 U/ML
B2 GLYCOPROT1 IGM SERPL IA-ACNC: <2.4 U/ML
CARDIOLIPIN IGG SER IA-ACNC: <2 GPL-U/ML
CARDIOLIPIN IGG SER IA-ACNC: NEGATIVE
CARDIOLIPIN IGM SER IA-ACNC: <2 MPL-U/ML
CARDIOLIPIN IGM SER IA-ACNC: NEGATIVE
LABORATORY COMMENT REPORT: NEGATIVE
LABORATORY COMMENT REPORT: NORMAL
SCREEN APTT/NORMAL: 0.97
SCREEN DRVVT/NORMAL: 0.66 %

## 2025-08-21 ENCOUNTER — HOSPITAL ENCOUNTER (OUTPATIENT)
Dept: ULTRASOUND IMAGING | Facility: CLINIC | Age: 35
End: 2025-08-21
Attending: STUDENT IN AN ORGANIZED HEALTH CARE EDUCATION/TRAINING PROGRAM
Payer: COMMERCIAL

## 2025-08-21 ENCOUNTER — OFFICE VISIT (OUTPATIENT)
Dept: MATERNAL FETAL MEDICINE | Facility: CLINIC | Age: 35
End: 2025-08-21
Attending: STUDENT IN AN ORGANIZED HEALTH CARE EDUCATION/TRAINING PROGRAM
Payer: COMMERCIAL

## 2025-08-21 VITALS — SYSTOLIC BLOOD PRESSURE: 128 MMHG | DIASTOLIC BLOOD PRESSURE: 87 MMHG | HEART RATE: 75 BPM

## 2025-08-21 DIAGNOSIS — O09.91 SUPERVISION OF HIGH RISK PREGNANCY IN FIRST TRIMESTER: ICD-10-CM

## 2025-08-21 DIAGNOSIS — O09.299 HISTORY OF PRE-ECLAMPSIA IN PRIOR PREGNANCY, CURRENTLY PREGNANT: Primary | ICD-10-CM

## 2025-08-21 DIAGNOSIS — O09.299 HISTORY OF PRE-ECLAMPSIA IN PRIOR PREGNANCY, CURRENTLY PREGNANT: ICD-10-CM

## 2025-08-21 DIAGNOSIS — O10.919 CHRONIC HYPERTENSION IN PREGNANCY: ICD-10-CM

## 2025-08-21 DIAGNOSIS — O09.299 HISTORY OF INTRAUTERINE GROWTH RESTRICTION IN PRIOR PREGNANCY, CURRENTLY PREGNANT: ICD-10-CM

## 2025-08-21 DIAGNOSIS — O09.892 HISTORY OF PRETERM DELIVERY, CURRENTLY PREGNANT IN SECOND TRIMESTER: ICD-10-CM

## 2025-08-21 PROCEDURE — 76816 OB US FOLLOW-UP PER FETUS: CPT

## (undated) DEVICE — STOCKING SLEEVE COMPRESSION CALF LG

## (undated) DEVICE — RETR VISCERA FISH

## (undated) DEVICE — CATH TRAY FOLEY SURESTEP 16FR WDRAIN BAG STLK LATEX A300316A

## (undated) DEVICE — SOL WATER IRRIG 1000ML BOTTLE 07139-09

## (undated) DEVICE — SOL NACL 0.9% IRRIG 1000ML BOTTLE 07138-09

## (undated) DEVICE — DRSG ABDOMINAL 07 1/2X8" 7197D

## (undated) DEVICE — DRSG STERI STRIP 1/4X3" R1541

## (undated) DEVICE — PACK C-SECTION LF PL15OTA83B

## (undated) DEVICE — STRAP KNEE/BODY 31143004

## (undated) DEVICE — PREP CHLORAPREP 26ML TINTED ORANGE  260815

## (undated) RX ORDER — MORPHINE SULFATE 1 MG/ML
INJECTION, SOLUTION EPIDURAL; INTRATHECAL; INTRAVENOUS
Status: DISPENSED
Start: 2023-10-02

## (undated) RX ORDER — BUPIVACAINE HYDROCHLORIDE 2.5 MG/ML
INJECTION, SOLUTION EPIDURAL; INFILTRATION; INTRACAUDAL
Status: DISPENSED
Start: 2023-10-02

## (undated) RX ORDER — FENTANYL CITRATE 50 UG/ML
INJECTION, SOLUTION INTRAMUSCULAR; INTRAVENOUS
Status: DISPENSED
Start: 2023-10-02